# Patient Record
Sex: MALE | Race: WHITE | NOT HISPANIC OR LATINO | Employment: FULL TIME | URBAN - METROPOLITAN AREA
[De-identification: names, ages, dates, MRNs, and addresses within clinical notes are randomized per-mention and may not be internally consistent; named-entity substitution may affect disease eponyms.]

---

## 2017-01-16 ENCOUNTER — ALLSCRIPTS OFFICE VISIT (OUTPATIENT)
Dept: OTHER | Facility: OTHER | Age: 52
End: 2017-01-16

## 2017-01-16 DIAGNOSIS — E80.6 OTHER DISORDERS OF BILIRUBIN METABOLISM: ICD-10-CM

## 2017-05-10 ENCOUNTER — ALLSCRIPTS OFFICE VISIT (OUTPATIENT)
Dept: OTHER | Facility: OTHER | Age: 52
End: 2017-05-10

## 2017-05-10 DIAGNOSIS — R73.01 IMPAIRED FASTING GLUCOSE: ICD-10-CM

## 2017-05-10 DIAGNOSIS — E78.5 HYPERLIPIDEMIA: ICD-10-CM

## 2017-10-15 ENCOUNTER — LAB CONVERSION - ENCOUNTER (OUTPATIENT)
Dept: OTHER | Facility: OTHER | Age: 52
End: 2017-10-15

## 2017-10-15 LAB
A/G RATIO (HISTORICAL): 1.8 (CALC) (ref 1–2.5)
ALBUMIN SERPL BCP-MCNC: 4.6 G/DL (ref 3.6–5.1)
ALP SERPL-CCNC: 51 U/L (ref 40–115)
ALT SERPL W P-5'-P-CCNC: 35 U/L (ref 9–46)
AST SERPL W P-5'-P-CCNC: 21 U/L (ref 10–35)
BILIRUB SERPL-MCNC: 1.4 MG/DL (ref 0.2–1.2)
BUN SERPL-MCNC: 16 MG/DL (ref 7–25)
BUN/CREA RATIO (HISTORICAL): ABNORMAL (CALC) (ref 6–22)
CALCIUM SERPL-MCNC: 9.7 MG/DL (ref 8.6–10.3)
CHLORIDE SERPL-SCNC: 103 MMOL/L (ref 98–110)
CHOLEST SERPL-MCNC: 167 MG/DL
CHOLEST/HDLC SERPL: 3 (CALC)
CO2 SERPL-SCNC: 29 MMOL/L (ref 20–31)
CREAT SERPL-MCNC: 1.01 MG/DL (ref 0.7–1.33)
EGFR AFRICAN AMERICAN (HISTORICAL): 100 ML/MIN/1.73M2
EGFR-AMERICAN CALC (HISTORICAL): 86 ML/MIN/1.73M2
GAMMA GLOBULIN (HISTORICAL): 2.6 G/DL (CALC) (ref 1.9–3.7)
GLUCOSE (HISTORICAL): 113 MG/DL (ref 65–99)
HDLC SERPL-MCNC: 55 MG/DL
LDL CHOLESTEROL (HISTORICAL): 83 MG/DL (CALC)
NON-HDL-CHOL (CHOL-HDL) (HISTORICAL): 112 MG/DL (CALC)
POTASSIUM SERPL-SCNC: 4.7 MMOL/L (ref 3.5–5.3)
SODIUM SERPL-SCNC: 139 MMOL/L (ref 135–146)
TOTAL PROTEIN (HISTORICAL): 7.2 G/DL (ref 6.1–8.1)
TRIGL SERPL-MCNC: 193 MG/DL

## 2017-10-16 ENCOUNTER — LAB CONVERSION - ENCOUNTER (OUTPATIENT)
Dept: OTHER | Facility: OTHER | Age: 52
End: 2017-10-16

## 2017-10-16 LAB
A/G RATIO (HISTORICAL): 1.8 (CALC) (ref 1–2.5)
ALBUMIN SERPL BCP-MCNC: 4.6 G/DL (ref 3.6–5.1)
ALP SERPL-CCNC: 51 U/L (ref 40–115)
ALT SERPL W P-5'-P-CCNC: 35 U/L (ref 9–46)
AST SERPL W P-5'-P-CCNC: 21 U/L (ref 10–35)
BILIRUB SERPL-MCNC: 1.4 MG/DL (ref 0.2–1.2)
BUN SERPL-MCNC: 16 MG/DL (ref 7–25)
BUN/CREA RATIO (HISTORICAL): ABNORMAL (CALC) (ref 6–22)
CALCIUM SERPL-MCNC: 9.7 MG/DL (ref 8.6–10.3)
CHLORIDE SERPL-SCNC: 103 MMOL/L (ref 98–110)
CHOLEST SERPL-MCNC: 167 MG/DL
CHOLEST/HDLC SERPL: 3 (CALC)
CO2 SERPL-SCNC: 29 MMOL/L (ref 20–31)
CREAT SERPL-MCNC: 1.01 MG/DL (ref 0.7–1.33)
EGFR AFRICAN AMERICAN (HISTORICAL): 100 ML/MIN/1.73M2
EGFR-AMERICAN CALC (HISTORICAL): 86 ML/MIN/1.73M2
GAMMA GLOBULIN (HISTORICAL): 2.6 G/DL (CALC) (ref 1.9–3.7)
GLUCOSE (HISTORICAL): 113 MG/DL (ref 65–99)
HBA1C MFR BLD HPLC: 5.9 % OF TOTAL HGB
HDLC SERPL-MCNC: 55 MG/DL
LDL CHOLESTEROL (HISTORICAL): 83 MG/DL (CALC)
NON-HDL-CHOL (CHOL-HDL) (HISTORICAL): 112 MG/DL (CALC)
POTASSIUM SERPL-SCNC: 4.7 MMOL/L (ref 3.5–5.3)
SODIUM SERPL-SCNC: 139 MMOL/L (ref 135–146)
TOTAL PROTEIN (HISTORICAL): 7.2 G/DL (ref 6.1–8.1)
TRIGL SERPL-MCNC: 193 MG/DL

## 2017-10-20 ENCOUNTER — ALLSCRIPTS OFFICE VISIT (OUTPATIENT)
Dept: OTHER | Facility: OTHER | Age: 52
End: 2017-10-20

## 2017-10-24 ENCOUNTER — ALLSCRIPTS OFFICE VISIT (OUTPATIENT)
Dept: OTHER | Facility: OTHER | Age: 52
End: 2017-10-24

## 2017-10-24 DIAGNOSIS — R73.01 IMPAIRED FASTING GLUCOSE: ICD-10-CM

## 2017-10-24 DIAGNOSIS — R06.83 SNORING: ICD-10-CM

## 2017-10-24 DIAGNOSIS — E78.5 HYPERLIPIDEMIA: ICD-10-CM

## 2017-10-24 DIAGNOSIS — E80.6 OTHER DISORDERS OF BILIRUBIN METABOLISM: ICD-10-CM

## 2017-10-24 DIAGNOSIS — Z12.5 ENCOUNTER FOR SCREENING FOR MALIGNANT NEOPLASM OF PROSTATE: ICD-10-CM

## 2017-10-27 NOTE — PROGRESS NOTES
Assessment  1  Depression (311) (F32 9)   2  Dyslipidemia (272 4) (E78 5)   3  Impaired fasting glucose (790 21) (R73 01)   4  Obesity (BMI 35 0-39 9 without comorbidity) (278 00) (E66 9)   5  Bilirubinemia (782 4) (E80 6)   6  Snoring (786 09) (R06 83)   7  Screening PSA (prostate specific antigen) (V76 44) (Z12 5)    Plan  Bilirubinemia    · US ABDOMEN LIMITED; Status:Hold For - Scheduling; Requested ZCW:[id number];   Bilirubinemia, Dyslipidemia    · (1) HEPATIC FUNCTION PANEL; Status:Active; Requested FUD:[id number];   Bilirubinemia, Dyslipidemia, Impaired fasting glucose, Screening PSA (prostate specific antigen)    · (1) URINALYSIS (will reflex a microscopy if leukocytes, occult blood, protein or nitrites are not within  normal limits); Status:Active; Requested FWI:[id number];   Bilirubinemia, Snoring    · (1) BASIC METABOLIC PROFILE; Status:Active; Requested UVX:79KWH3414;    · (1) CBC/PLT/DIFF; Status:Active; Requested PIYUSH:[id number];   Depression screening    · *VB-Depression Screening; Status:Complete - Retrospective By Protocol Authorization;   Done:  58QKD9394 09:18AM   · *VB-Depression Screening ; every 1 year; Last 48MCJ3051; Next 84AMJ2805; Status:Active  PMH: Encounter for screening for malignant neoplasm of colon    · COLONOSCOPY; Status:Active; Requested WFO:[id number];   Screening PSA (prostate specific antigen)    · (1) PSA FREE & TOTAL; Status:Active; Requested AJK:[id number];   Snoring    · *Polysomnography, Sleep Study, Diagnostic; Status:Need Information - Financial Authorization; Requested UBY:[id number];   there any other medical conditions or medications that would explain these      symptoms? : No  is the sleep disturbance affecting the patient's ability to function? : daytime fatigue,      snoring  History/Symptoms: : daytime fatigue, snoring  Study Only or Consult : Sleep Study and Consult and F/U with Sleep Specialist    Discussion/Summary  Discussion Summary:   Reviewed labs, A1C improved  Go for U/S regarding increased bilirubin  Continue statin  No med changes  Continue low fat low cholesterol diet and weight loss  Encouraged repeat sleep study and use CPAP which can help with weight and sleep  Continue on Wellbutrin with Effexor  Commended on reducing smoking, encouraged stop fully  4 month [facility] follow up for chronic conditions  Counseling Documentation With Imm: The patient was counseled regarding diagnostic results,-- instructions for management,-- risk factor reductions,-- prognosis,-- patient and family education,-- impressions,-- risks and benefits of treatment options,-- importance of compliance with treatment  Medication SE Review and Pt Understands Tx: Possible side effects of new medications were reviewed with the patient/guardian today  The treatment plan was reviewed with the patient/guardian  The patient/guardian understands and agrees with the treatment plan      Chief Complaint  Chief Complaint Free Text Note Form: Pt is here for a f/u appt  BW       History of Present Illness  HPI: 50year old male for follow up  Notes he was treated for dental pain and pharyngitis  He was tx with abx  Sore throat improving each day  Able to eat and drink  Fevers broke  Starting to feel better  He notes he caught this while traveling  He was in [place] while he was training for work  He lost 2 lbas since last visit  Trying to get back on track  Work is ok  + life stressors  Notes increased stress on his girlfriends side  Her son has some problems they are dealing with  Taking effexor as directed which helps  Does not follow with other specialists  Hwe notes he did nto yet get ultrasound of his liver  Plans to get this and colonoscopy  Obesity (Brief): The patient is being seen for a routine clinic follow-up of obesity  Depression (Follow-Up): The patient states his depression has been stable since the last visit  He describes this as mild  Comorbid Illnesses: See HPI     Interval Symptoms: stable depression-- and-- stable depressed mood  no SI no HI  Hyperlipidemia (Follow-Up): The patient states his hyperlipidemia has been stable since the last visit  Symptoms: denies chest pain,-- denies muscle pain-- and-- denies muscle weakness  Medications: the patient is adherent with his medication regimen  -- He denies medication side effects  Additional History: Taking fish oil to help with trigs  Trying to take BID dosing  Review of Systems  PHQ-9 Depression Scale: Over the past 2 weeks, how often have you been bothered by the following problems? 1 ) Little interest or pleasure in doing things? Not at all    2 ) Feeling down, depressed or hopeless? Not at all    3 ) Trouble falling asleep or sleeping too much? Several days  4 ) Feeling tired or having little energy? Several days  5 ) Poor appetite or overeating? Several days  6 ) Feeling bad about yourself, or that you are a failure, or have let yourself or your family down? Not at all    7 ) Trouble concentrating on things, such as reading a newspaper or watching television? Not at all    8 ) Moving or speaking so slowly that other people could have noticed, or the opposite, moving or speaking faster than usual? Not at all  How difficult have these problems made it for you to do your work, take care of things at home, or get along with people? Not at all  Score 3       Complete-Male:   Constitutional: no fever,-- no recent weight gain-- and-- no chills  ENT: ST improving  Cardiovascular: no chest pain-- and-- no palpitations  Respiratory: no shortness of breath-- and-- no cough  Gastrointestinal: no abdominal pain,-- no nausea,-- no vomiting,-- no constipation,-- no diarrhea-- and-- no blood in stools  Psychiatric: anxiety,-- depression-- and-- stable on medications, but-- as noted in HPI  Active Problems  1  Acute pharyngitis, unspecified etiology (462) (J02 9)   2  Depression (311) (F32 9)   3   Dyslipidemia (272  4) (E78 5)   4  Impaired fasting glucose (790 21) (R73 01)   5  Obesity (BMI 35 0-39 9 without comorbidity) (278 00) (E66 9)    Past Medical History  1  History of Chest pain, unspecified chest pain type   2  History of Elevated BP without diagnosis of hypertension (796 2) (R03 0)   3  History of Increased bilirubin level (277 4) (E80 6)  Active Problems And Past Medical History Reviewed: The active problems and past medical history were reviewed and updated today  Surgical History  1  Denied: History Of Prior Surgery   2  History of Lip Repair  Surgical History Reviewed: The surgical history was reviewed and updated today  Family History  Father    1  Family history of borderline diabetes mellitus (V18 0) (Z84 89)   2  Family history of hypothyroidism (V18 19) (Z83 49)   3  Family history of malignant neoplasm of prostate (V16 42) (Z80 45)  Sister    4  Family history of fibromyalgia (V17 89) (Z82 69)   5  Family history of hypothyroidism (V18 19) (Z83 49)  Family History    6  Family history of Heart problem  Family History Reviewed: The family history was reviewed and updated today  Social History   · Former smoker (B43 43) (U70 052)   · Denied: History of No drug use   · Social alcohol use (Z78 9)  Social History Reviewed: The social history was reviewed and updated today  Current Meds   1  Aspirin 81 MG TABS; Therapy: (HSAZJJQL:87JFL5522) to Recorded   2  Atorvastatin Calcium 20 MG Oral Tablet; TAKE 1 TABLET DAILY  Requested for: 16HGH4577; Last   Rx:18Oct2017 Ordered   3  BuPROPion HCl ER (XL) 300 MG Oral Tablet Extended Release 24 Hour; TAKE 1 TABLET DAILY    Requested for: 88DEU0986; Last Rx:18Oct2017 Ordered   4  [facility] Fish Oil 1000 MG Oral Capsule; TAKE 2 CAPSULE Twice daily; Last Rx:28Nov2016 Ordered   5  Magnesium TABS; Therapy: (Recorded:19Zxh4603) to Recorded   6  Multivitamin TABS; Therapy: (XLTKYEBO:31RCX9004) to Recorded   7   Turmeric Curcumin Oral Capsule; Therapy: (Recorded:72Vtt8875) to Recorded   8  Venlafaxine HCl  MG Oral Capsule Extended Release 24 Hour; TAKE 1 CAPSULE DAILY; Therapy: 88GVZ4151 to (Evaluate:74Ydc0960)  Requested for: 53Wfa9541; Last Rx:15Czy3276   Ordered  Medication List Reviewed: The medication list was reviewed and updated today  Allergies  1  No Known Drug Allergies  2  No Known Environmental Allergies   3  No Known Food Allergies    Vitals  Vital Signs    Recorded: 57SEF8656 09:02AM   Temperature 98 6 F, Oral   Heart Rate 734   Systolic 071, LUE, Sitting   Diastolic 88, LUE, Sitting   BP CUFF SIZE Large   Height 5 ft 11 in   Weight 284 lb 2 oz   BMI Calculated 39 63   BSA Calculated 2 45   O2 Saturation 97, RA     Physical Exam    Constitutional   General appearance: No acute distress, well appearing and well nourished  -- Alert, pleasant cooperative seated in NAD  Eyes   Pupils and irises: Equal, round and reactive to light  -- reactive, wearing glasses  Ears, Nose, Mouth, and Throat   Oropharynx: Normal with no erythema, edema, exudate or lesions  -- MMM, normal pharynx without erythema or exudates  Pulmonary   Respiratory effort: No increased work of breathing or signs of respiratory distress  -- CTA throughout  Auscultation of lungs: Clear to auscultation, equal breath sounds bilaterally, no wheezes, no rales, no rhonci  Cardiovascular   Auscultation of heart: Normal rate and rhythm, normal S1 and S2, without murmurs  -- RRR -- No edema     Abdomen soft, NT, obese, no palpable masses obesity limites exam    Musculoskeletal   Gait and station: Normal     Psychiatric   Mood and affect: Normal  -- (Appropriate dress, good eye contact) Mood and Affect: appropriate mood,-- not angry,-- not anxious,-- not concerned,-- depressed,-- not tearful-- and-- not unemotional         Results/Data  (1) LIPID PANEL, FASTING 23TVX2219 08:09AM Maria Alejandra Munoz     Test Name Result Flag Reference   CHOLESTEROL, TOTAL 167 mg/dL <200   HDL CHOLESTEROL 55 mg/dL  >84   TRIGLICERIDES 956 mg/dL H <150   LDL-CHOLESTEROL 83 mg/dL (calc)     Reference range: <100     Desirable range <100 mg/dL for patients with CHD or  diabetes and <70 mg/dL for diabetic patients with  known heart disease  LDL-C is now calculated using the Reed-Nelson   calculation, which is a validated novel method providing   better accuracy than the Friedewald equation in the   estimation of LDL-C  Sara Holloway  [place] [id number];171(77): 0881-8095   (http://FastSpring/faq/MRZ447)   CHOL/HDLC RATIO 3 0 (calc)  <5 0   NON HDL CHOLESTEROL 112 mg/dL (calc)  <130   For patients with diabetes plus 1 major ASCVD risk   factor, treating to a non-HDL-C goal of <100 mg/dL   (LDL-C of <70 mg/dL) is considered a therapeutic   option  (1) COMPREHENSIVE METABOLIC PANEL 92SJR7830 40:15XI Gladis Piedra   REPORT COMMENT:  FASTING:YES     Test Name Result Flag Reference   GLUCOSE 113 mg/dL H 65-99   Fasting reference interval     For someone without known diabetes, a glucose value  between 100 and 125 mg/dL is consistent with  prediabetes and should be confirmed with a  follow-up test    UREA NITROGEN (BUN) 16 mg/dL  7-25   CREATININE 1 01 mg/dL  0 70-1 33   For patients >52years of age, the reference limit  for Creatinine is approximately 13% higher for people  identified as -American  eGFR NON-AFR   AMERICAN 86 mL/min/1 73m2  > OR = 60   eGFR AFRICAN AMERICAN 100 mL/min/1 73m2  > OR = 60   BUN/CREATININE RATIO   7-85   NOT APPLICABLE (calc)   SODIUM 139 mmol/L  135-146   POTASSIUM 4 7 mmol/L  3 5-5 3   CHLORIDE 103 mmol/L     CARBON DIOXIDE 29 mmol/L  20-31   CALCIUM 9 7 mg/dL  8 6-10 3   PROTEIN, TOTAL 7 2 g/dL  6 1-8 1   ALBUMIN 4 6 g/dL  3 6-5 1   GLOBULIN 2 6 g/dL (calc)  1 9-3 7   ALBUMIN/GLOBULIN RATIO 1 8 (calc)  1 0-2 5   BILIRUBIN, TOTAL 1 4 mg/dL H 0 2-1 2   ALKALINE PHOSPHATASE 51 U/L     AST 21 U/L  10-35   ALT 35 U/L  9-46 (1) LIPID PANEL, FASTING 40Cdn3188 08:09AM Ted Ladd     Test Name Result Flag Reference   CHOLESTEROL, TOTAL 167 mg/dL  <200   HDL CHOLESTEROL 55 mg/dL  >10   TRIGLICERIDES 528 mg/dL H <150   LDL-CHOLESTEROL 83 mg/dL (calc)     Reference range: <100     Desirable range <100 mg/dL for patients with CHD or  diabetes and <70 mg/dL for diabetic patients with  known heart disease  LDL-C is now calculated using the Reed-Nelson   calculation, which is a validated novel method providing   better accuracy than the Friedewald equation in the   estimation of LDL-C  Dori Armstrong  Beverly Goyal  1572;964(94): 6838-3216   (http://DestinationRX/faq/DBK250)   CHOL/HDLC RATIO 3 0 (calc)  <5 0   NON HDL CHOLESTEROL 112 mg/dL (calc)  <130   For patients with diabetes plus 1 major ASCVD risk   factor, treating to a non-HDL-C goal of <100 mg/dL   (LDL-C of <70 mg/dL) is considered a therapeutic   option  (1) COMPREHENSIVE METABOLIC PANEL 10RCF5896 79:40UO Ted Ladd     Test Name Result Flag Reference   GLUCOSE 113 mg/dL H 65-99   Fasting reference interval     For someone without known diabetes, a glucose value  between 100 and 125 mg/dL is consistent with  prediabetes and should be confirmed with a  follow-up test    UREA NITROGEN (BUN) 16 mg/dL  7-25   CREATININE 1 01 mg/dL  0 70-1 33   For patients >52years of age, the reference limit  for Creatinine is approximately 13% higher for people  identified as -American  eGFR NON-AFR   AMERICAN 86 mL/min/1 73m2  > OR = 60   eGFR AFRICAN AMERICAN 100 mL/min/1 73m2  > OR = 60   BUN/CREATININE RATIO   8-35   NOT APPLICABLE (calc)   SODIUM 139 mmol/L  135-146   POTASSIUM 4 7 mmol/L  3 5-5 3   CHLORIDE 103 mmol/L     CARBON DIOXIDE 29 mmol/L  20-31   CALCIUM 9 7 mg/dL  8 6-10 3   PROTEIN, TOTAL 7 2 g/dL  6 1-8 1   ALBUMIN 4 6 g/dL  3 6-5 1   GLOBULIN 2 6 g/dL (calc)  1 9-3 7   ALBUMIN/GLOBULIN RATIO 1 8 (calc)  1 0-2 5   BILIRUBIN, TOTAL 1 4 mg/dL H 0 2-1 2   ALKALINE PHOSPHATASE 51 U/L     AST 21 U/L  10-35   ALT 35 U/L  9-46     (Q) HEMOGLOBIN A1c 14Oct2017 08:09AM Moses Borja   REPORT COMMENT:  FASTING:YES     Test Name Result Flag Reference   HEMOGLOBIN A1c 5 9 % of total Hgb H <5 7   For someone without known diabetes, a hemoglobin   A1c value between 5 7% and 6 4% is consistent with  prediabetes and should be confirmed with a   follow-up test      For someone with known diabetes, a value <7%  indicates that their diabetes is well controlled  A1c  targets should be individualized based on duration of  diabetes, age, comorbid conditions, and other  considerations  This assay result is consistent with an increased risk  of diabetes  Currently, no consensus exists regarding use of  hemoglobin A1c for diagnosis of diabetes for children       Signatures   Electronically signed by : Bjorn Gutierrez, [facility]; Oct 24 2017  9:32AM EST                       (Author)    Electronically signed by : Cheryle Xiao MD; Oct 26 2017  5:30PM EST

## 2018-01-13 VITALS
HEIGHT: 71 IN | SYSTOLIC BLOOD PRESSURE: 112 MMHG | HEART RATE: 95 BPM | TEMPERATURE: 99.2 F | BODY MASS INDEX: 39.59 KG/M2 | OXYGEN SATURATION: 98 % | DIASTOLIC BLOOD PRESSURE: 80 MMHG | WEIGHT: 282.8 LBS

## 2018-01-13 VITALS
HEIGHT: 71 IN | TEMPERATURE: 98.5 F | WEIGHT: 286 LBS | DIASTOLIC BLOOD PRESSURE: 84 MMHG | HEART RATE: 86 BPM | SYSTOLIC BLOOD PRESSURE: 130 MMHG | OXYGEN SATURATION: 98 % | BODY MASS INDEX: 40.04 KG/M2

## 2018-01-13 VITALS
TEMPERATURE: 96 F | SYSTOLIC BLOOD PRESSURE: 134 MMHG | DIASTOLIC BLOOD PRESSURE: 84 MMHG | WEIGHT: 286 LBS | BODY MASS INDEX: 40.04 KG/M2 | OXYGEN SATURATION: 97 % | HEART RATE: 88 BPM | HEIGHT: 71 IN

## 2018-01-14 VITALS
SYSTOLIC BLOOD PRESSURE: 138 MMHG | DIASTOLIC BLOOD PRESSURE: 88 MMHG | BODY MASS INDEX: 39.78 KG/M2 | HEIGHT: 71 IN | TEMPERATURE: 98.6 F | HEART RATE: 101 BPM | OXYGEN SATURATION: 97 % | WEIGHT: 284.13 LBS

## 2018-01-22 ENCOUNTER — ALLSCRIPTS OFFICE VISIT (OUTPATIENT)
Dept: OTHER | Facility: OTHER | Age: 53
End: 2018-01-22

## 2018-01-24 NOTE — MISCELLANEOUS
Message  Return to work or school:   Cristina Pedraza is under my professional care   He was seen in my office on 1/22/2018   He is able to return to work on  1/25/2018            Signatures   Electronically signed by : Vinay Gao; Jan 22 2018 11:57AM EST                       (Author)

## 2018-01-31 DIAGNOSIS — F32.A DEPRESSION, UNSPECIFIED DEPRESSION TYPE: Primary | ICD-10-CM

## 2018-01-31 RX ORDER — VENLAFAXINE HYDROCHLORIDE 150 MG/1
150 CAPSULE, EXTENDED RELEASE ORAL DAILY
Qty: 90 CAPSULE | Refills: 0 | Status: SHIPPED | OUTPATIENT
Start: 2018-01-31 | End: 2018-05-18 | Stop reason: SDUPTHER

## 2018-01-31 RX ORDER — VENLAFAXINE HYDROCHLORIDE 150 MG/1
1 CAPSULE, EXTENDED RELEASE ORAL DAILY
COMMUNITY
Start: 2015-12-09 | End: 2018-01-31 | Stop reason: SDUPTHER

## 2018-04-27 DIAGNOSIS — F32.A DEPRESSION, UNSPECIFIED DEPRESSION TYPE: ICD-10-CM

## 2018-04-27 DIAGNOSIS — E78.5 DYSLIPIDEMIA: Primary | ICD-10-CM

## 2018-04-27 RX ORDER — ATORVASTATIN CALCIUM 20 MG/1
1 TABLET, FILM COATED ORAL DAILY
COMMUNITY
End: 2018-04-27 | Stop reason: SDUPTHER

## 2018-04-27 RX ORDER — ATORVASTATIN CALCIUM 20 MG/1
20 TABLET, FILM COATED ORAL DAILY
Qty: 90 TABLET | Refills: 0 | Status: SHIPPED | OUTPATIENT
Start: 2018-04-27 | End: 2018-07-27 | Stop reason: SDUPTHER

## 2018-04-27 RX ORDER — BUPROPION HYDROCHLORIDE 300 MG/1
1 TABLET ORAL DAILY
COMMUNITY
End: 2018-04-27 | Stop reason: SDUPTHER

## 2018-04-27 RX ORDER — BUPROPION HYDROCHLORIDE 300 MG/1
300 TABLET ORAL DAILY
Qty: 90 TABLET | Refills: 0 | Status: SHIPPED | OUTPATIENT
Start: 2018-04-27 | End: 2018-07-27 | Stop reason: SDUPTHER

## 2018-05-18 DIAGNOSIS — F32.A DEPRESSION, UNSPECIFIED DEPRESSION TYPE: ICD-10-CM

## 2018-05-18 RX ORDER — VENLAFAXINE HYDROCHLORIDE 150 MG/1
150 CAPSULE, EXTENDED RELEASE ORAL DAILY
Qty: 90 CAPSULE | Refills: 0 | Status: SHIPPED | OUTPATIENT
Start: 2018-05-18 | End: 2018-07-27 | Stop reason: SDUPTHER

## 2018-05-18 NOTE — TELEPHONE ENCOUNTER
Please call patient and let him know, no further refills will be given until follow-up and please schedule

## 2018-05-18 NOTE — TELEPHONE ENCOUNTER
Pt needs 90 day b/c Rx is being sent to mail order  No further refills will be given until he is seen  Pt was to f/u in 4M

## 2018-07-27 DIAGNOSIS — F32.A DEPRESSION, UNSPECIFIED DEPRESSION TYPE: ICD-10-CM

## 2018-07-27 DIAGNOSIS — E78.5 DYSLIPIDEMIA: ICD-10-CM

## 2018-07-27 RX ORDER — VENLAFAXINE HYDROCHLORIDE 150 MG/1
150 CAPSULE, EXTENDED RELEASE ORAL DAILY
Qty: 90 CAPSULE | Refills: 1 | Status: SHIPPED | OUTPATIENT
Start: 2018-07-27 | End: 2018-10-26 | Stop reason: SDUPTHER

## 2018-07-27 RX ORDER — ELECTROLYTES/DEXTROSE
1 SOLUTION, ORAL ORAL DAILY
COMMUNITY

## 2018-07-27 RX ORDER — CHLORHEXIDINE/GLYCERIN/HE-CELL
2 JELLY (GRAM) TOPICAL 2 TIMES DAILY
COMMUNITY

## 2018-07-27 RX ORDER — ATORVASTATIN CALCIUM 20 MG/1
20 TABLET, FILM COATED ORAL DAILY
Qty: 90 TABLET | Refills: 1 | Status: SHIPPED | OUTPATIENT
Start: 2018-07-27 | End: 2018-10-26 | Stop reason: SDUPTHER

## 2018-07-27 RX ORDER — BUPROPION HYDROCHLORIDE 300 MG/1
300 TABLET ORAL DAILY
Qty: 90 TABLET | Refills: 1 | Status: SHIPPED | OUTPATIENT
Start: 2018-07-27 | End: 2018-10-26 | Stop reason: SDUPTHER

## 2018-08-15 LAB
ALBUMIN SERPL-MCNC: 4.7 G/DL (ref 3.6–5.1)
ALBUMIN/GLOB SERPL: 1.9 (CALC) (ref 1–2.5)
ALP SERPL-CCNC: 54 U/L (ref 40–115)
ALT SERPL-CCNC: 32 U/L (ref 9–46)
APPEARANCE UR: CLEAR
AST SERPL-CCNC: 23 U/L (ref 10–35)
BASOPHILS # BLD AUTO: 42 CELLS/UL (ref 0–200)
BASOPHILS NFR BLD AUTO: 0.8 %
BILIRUB DIRECT SERPL-MCNC: 0.2 MG/DL
BILIRUB INDIRECT SERPL-MCNC: 1.2 MG/DL (CALC) (ref 0.2–1.2)
BILIRUB SERPL-MCNC: 1.4 MG/DL (ref 0.2–1.2)
BILIRUB UR QL STRIP: NEGATIVE
BUN SERPL-MCNC: 19 MG/DL (ref 7–25)
BUN/CREAT SERPL: NORMAL (CALC) (ref 6–22)
CALCIUM SERPL-MCNC: 9.6 MG/DL (ref 8.6–10.3)
CHLORIDE SERPL-SCNC: 99 MMOL/L (ref 98–110)
CO2 SERPL-SCNC: 28 MMOL/L (ref 20–32)
COLOR UR: YELLOW
CREAT SERPL-MCNC: 0.95 MG/DL (ref 0.7–1.33)
EOSINOPHIL # BLD AUTO: 170 CELLS/UL (ref 15–500)
EOSINOPHIL NFR BLD AUTO: 3.2 %
ERYTHROCYTE [DISTWIDTH] IN BLOOD BY AUTOMATED COUNT: 14.3 % (ref 11–15)
GLOBULIN SER CALC-MCNC: 2.5 G/DL (CALC) (ref 1.9–3.7)
GLUCOSE SERPL-MCNC: 99 MG/DL (ref 65–99)
GLUCOSE UR QL STRIP: NEGATIVE
HCT VFR BLD AUTO: 46.7 % (ref 38.5–50)
HGB BLD-MCNC: 15.4 G/DL (ref 13.2–17.1)
HGB UR QL STRIP: NEGATIVE
KETONES UR QL STRIP: NEGATIVE
LEUKOCYTE ESTERASE UR QL STRIP: NEGATIVE
LYMPHOCYTES # BLD AUTO: 1786 CELLS/UL (ref 850–3900)
LYMPHOCYTES NFR BLD AUTO: 33.7 %
MCH RBC QN AUTO: 30.5 PG (ref 27–33)
MCHC RBC AUTO-ENTMCNC: 33 G/DL (ref 32–36)
MCV RBC AUTO: 92.5 FL (ref 80–100)
MONOCYTES # BLD AUTO: 541 CELLS/UL (ref 200–950)
MONOCYTES NFR BLD AUTO: 10.2 %
NEUTROPHILS # BLD AUTO: 2761 CELLS/UL (ref 1500–7800)
NEUTROPHILS NFR BLD AUTO: 52.1 %
NITRITE UR QL STRIP: NEGATIVE
PH UR STRIP: 6 [PH] (ref 5–8)
PLATELET # BLD AUTO: 205 THOUSAND/UL (ref 140–400)
PMV BLD REES-ECKER: 10.6 FL (ref 7.5–12.5)
POTASSIUM SERPL-SCNC: 4.5 MMOL/L (ref 3.5–5.3)
PROT SERPL-MCNC: 7.2 G/DL (ref 6.1–8.1)
PROT UR QL STRIP: NEGATIVE
PSA FREE MFR SERPL: 33 % (CALC)
PSA FREE SERPL-MCNC: 0.1 NG/ML
PSA SERPL-MCNC: 0.3 NG/ML
RBC # BLD AUTO: 5.05 MILLION/UL (ref 4.2–5.8)
SL AMB EGFR AFRICAN AMERICAN: 107 ML/MIN/1.73M2
SL AMB EGFR NON AFRICAN AMERICAN: 92 ML/MIN/1.73M2
SODIUM SERPL-SCNC: 135 MMOL/L (ref 135–146)
SP GR UR STRIP: 1.02 (ref 1–1.03)
WBC # BLD AUTO: 5.3 THOUSAND/UL (ref 3.8–10.8)

## 2018-08-17 ENCOUNTER — OFFICE VISIT (OUTPATIENT)
Dept: INTERNAL MEDICINE CLINIC | Facility: CLINIC | Age: 53
End: 2018-08-17
Payer: COMMERCIAL

## 2018-08-17 VITALS
OXYGEN SATURATION: 97 % | HEART RATE: 83 BPM | SYSTOLIC BLOOD PRESSURE: 122 MMHG | TEMPERATURE: 98.1 F | BODY MASS INDEX: 36.31 KG/M2 | HEIGHT: 71 IN | DIASTOLIC BLOOD PRESSURE: 78 MMHG | WEIGHT: 259.4 LBS

## 2018-08-17 DIAGNOSIS — R06.83 SNORING: ICD-10-CM

## 2018-08-17 DIAGNOSIS — E66.9 OBESITY (BMI 35.0-39.9 WITHOUT COMORBIDITY): ICD-10-CM

## 2018-08-17 DIAGNOSIS — F32.A DEPRESSION, UNSPECIFIED DEPRESSION TYPE: ICD-10-CM

## 2018-08-17 DIAGNOSIS — R73.01 IMPAIRED FASTING GLUCOSE: ICD-10-CM

## 2018-08-17 DIAGNOSIS — H91.93 DECREASED HEARING OF BOTH EARS: ICD-10-CM

## 2018-08-17 DIAGNOSIS — Z12.11 SCREENING FOR COLON CANCER: Primary | ICD-10-CM

## 2018-08-17 DIAGNOSIS — E78.5 DYSLIPIDEMIA: ICD-10-CM

## 2018-08-17 DIAGNOSIS — E80.6 BILIRUBINEMIA: ICD-10-CM

## 2018-08-17 PROBLEM — H66.002 ACUTE SUPPURATIVE OTITIS MEDIA OF LEFT EAR WITHOUT SPONTANEOUS RUPTURE OF TYMPANIC MEMBRANE: Status: RESOLVED | Noted: 2018-01-22 | Resolved: 2018-08-17

## 2018-08-17 PROBLEM — R68.89 FLU-LIKE SYMPTOMS: Status: ACTIVE | Noted: 2018-01-22

## 2018-08-17 PROBLEM — H66.002 ACUTE SUPPURATIVE OTITIS MEDIA OF LEFT EAR WITHOUT SPONTANEOUS RUPTURE OF TYMPANIC MEMBRANE: Status: ACTIVE | Noted: 2018-01-22

## 2018-08-17 PROBLEM — R68.89 FLU-LIKE SYMPTOMS: Status: RESOLVED | Noted: 2018-01-22 | Resolved: 2018-08-17

## 2018-08-17 PROBLEM — J02.9 ACUTE PHARYNGITIS: Status: RESOLVED | Noted: 2017-10-20 | Resolved: 2018-08-17

## 2018-08-17 PROBLEM — J02.9 ACUTE PHARYNGITIS: Status: ACTIVE | Noted: 2017-10-20

## 2018-08-17 PROCEDURE — 99214 OFFICE O/P EST MOD 30 MIN: CPT | Performed by: PHYSICIAN ASSISTANT

## 2018-08-17 PROCEDURE — 1036F TOBACCO NON-USER: CPT | Performed by: PHYSICIAN ASSISTANT

## 2018-08-17 NOTE — ASSESSMENT & PLAN NOTE
Grossly hearing intact however patient having more difficulty with hearing in noisy situations    Will send for formal hearing evaluation

## 2018-08-17 NOTE — PROGRESS NOTES
[facility] [address] [place] [address]  Standard Office Visit  Patient ID: Clarissa Aquino    : 1965  Age/Gender: 48 y o  male     DATE: 2018      Assessment/Plan:    Impaired fasting glucose  Commended patient on recent weight loss  Continue with diet and lifestyle modifications  Balance diet recommended  Bilirubinemia  Will check U/S, hepatic fxn reviewed  Tbili 1 4, unchanged form prior  Advised reduced ETOH intake  Decreased hearing of both ears    Grossly hearing intact however patient having more difficulty with hearing in noisy situations  Will send for formal hearing evaluation    Depression   Stable at this time on Effexor and Wellbutrin  No dose change  Will continue to follow    Dyslipidemia   Lipids were not check with most recent laboratory studies  Will continue Lipitor at this time  Will check CMP and lipids with next lab set    Obesity (BMI 35 0-39 9 without comorbidity)   Commended patient on weight loss  Give encouragement  Will continue to follow  Snoring  Advised him to schedule sleep study, order given today  Diagnoses and all orders for this visit:    Screening for colon cancer  -     Ambulatory referral to Gastroenterology; Future    Impaired fasting glucose  -     Hemoglobin A1C; Future    Bilirubinemia  -     Comprehensive metabolic panel; Future  -     US liver; Future    Dyslipidemia  -     Lipid Panel with Direct LDL reflex; Future  -     Comprehensive metabolic panel; Future    Obesity (BMI 35 0-39 9 without comorbidity)    Snoring  -     Diagnostic Sleep Study; Future    Decreased hearing of both ears  -     Audiogram screen; Future    Depression, unspecified depression type          Subjective:   Chief Complaint   Patient presents with    Follow-up     Pt is here for a follow up for HTN  Review labs from 18            Clarissa Aquino is a 48 y o  male who presents to the office on 2018 for For follow up  Notes he has not been able to get testing from 10/2017 visit due to being without insurance  He notes he had the flu earlier this year but sx resolved  Since last visit with me he changed jobs  Notes he still does have travel but likes what he is doing more at this jobs  Weight decreased from 290 to 259  Very active at his job  Notes he is very busy  Has nto had back issues in some time  Has referral for sleep study  Wanted to check to see if there is anything he needs to do before any sleep testing done  He notes he is due for ultrasound of the liver and sleep study  No formal exercise program   He notes that he feels he should have his hearing checked  Has noticed it more over last 6 months  Has problems distinguishing what people say  HE can hear but notes he has to have people repeat itself  Worse when he has air conditioner and TV  Worse when there is background noise to compete with  No known ear trauma   + Hx of loud noise exposure, always liked louder music  No ringing in the ears  No dizziness  HE stopped smoking  He has been tobacco free about 1 week  Notes he still does snore  Has improved with his weight loss  Some daytime fatigue  Girlfriend notes he does have apnic events when snoring  Weight loss has improved these sx but still does snore  Depression   This is a chronic problem  The problem has been gradually improving  Pertinent negatives include no abdominal pain, anorexia, change in bowel habit, chest pain, chills, coughing, fatigue, headaches, nausea, numbness, sore throat, visual change (last eye exam was 3 years ago  No recent vision changes) or weakness         The following portions of the patient's history were reviewed and updated as appropriate: allergies, current medications, past family history, past medical history, past social history, past surgical history and problem list     Review of Systems   Constitutional: Negative for chills, fatigue and unexpected weight change  HENT: Negative for sore throat  Respiratory: Negative for cough, shortness of breath and wheezing  No exertional symptoms  Cardiovascular: Negative for chest pain and palpitations  Gastrointestinal: Negative for abdominal pain, anorexia, change in bowel habit and nausea  Neurological: Negative for dizziness, syncope, weakness, light-headedness, numbness and headaches  Psychiatric/Behavioral: Positive for depression           Patient Active Problem List   Diagnosis    Bilirubinemia    Depression    Dyslipidemia    Impaired fasting glucose    Obesity (BMI 35 0-39 9 without comorbidity)    Snoring    Decreased hearing of both ears       Past Medical History:   Diagnosis Date    Acute suppurative otitis media of left ear without spontaneous rupture of tympanic membrane 1/22/2018    Depression     HL (hearing loss)     Hypertension     Obesity (BMI 35 0-39 9 without comorbidity) 1/16/2017       Past Surgical History:   Procedure Laterality Date    WISDOM TOOTH EXTRACTION           Current Outpatient Prescriptions:     aspirin 81 MG tablet, Take 1 tablet by mouth daily, Disp: , Rfl:     atorvastatin (LIPITOR) 20 mg tablet, Take 1 tablet (20 mg total) by mouth daily, Disp: 90 tablet, Rfl: 1    buPROPion (WELLBUTRIN XL) 300 mg 24 hr tablet, Take 1 tablet (300 mg total) by mouth daily, Disp: 90 tablet, Rfl: 1    Multiple Vitamins-Minerals (MULTIVITAMIN ADULT) TABS, Take 1 tablet by mouth daily, Disp: , Rfl:     Omega-3 Fatty Acids ([employer] FISH OIL) 1000 MG CAPS, Take 2 capsules by mouth 2 (two) times a day, Disp: , Rfl:     Turmeric Curcumin 500 MG CAPS, Take 1 capsule by mouth daily, Disp: , Rfl:     venlafaxine (EFFEXOR-XR) 150 mg 24 hr capsule, Take 1 capsule (150 mg total) by mouth daily, Disp: 90 capsule, Rfl: 1    Magnesium 100 MG TABS, Take 1 tablet by mouth daily, Disp: , Rfl:     No Known Allergies    Social History     Social History    Marital status:      Spouse name: N/A    Number of children: N/A    Years of education: N/A     Social History Main Topics    Smoking status: Former Smoker     Quit date: 12/2015    Smokeless tobacco: Never Used    Alcohol use Yes      Comment: Social    Drug use: No    Sexual activity: Not Asked     Other Topics Concern    None     Social History Narrative    None       Family History   Problem Relation Age of Onset    Diabetes Father         borderline    Hypothyroidism Father     Prostate cancer Father     Hypothyroidism Sister     Fibromyalgia Sister     Heart disease Family        PHQ-9 Depression Screening    PHQ-9:    Frequency of the following problems over the past two weeks:       Little interest or pleasure in doing things:  0 - not at all  Feeling down, depressed, or hopeless:  0 - not at all  PHQ-2 Score:  0         Health Maintenance   Topic Date Due    HIV SCREENING  1965    CRC Screening: Colonoscopy  1965    DTaP,Tdap,and Td Vaccines (1 - Tdap) 04/14/1988    INFLUENZA VACCINE  09/01/2018       Immunization History   Administered Date(s) Administered    H1N1, All Formulations 01/14/2010    Influenza 12/07/2015, 11/28/2016    Influenza Quadrivalent Preservative Free 3 years and older IM 12/07/2015    Influenza Quadrivalent, 6-35 Months IM 11/28/2016        Objective:  Vitals:    08/17/18 0839   BP: 122/78   BP Location: Left arm   Patient Position: Sitting   Cuff Size: Adult   Pulse: 83   Temp: 98 1 °F (36 7 °C)   TempSrc: Oral   SpO2: 97%   Weight: 118 kg (259 lb 6 4 oz)   Height: 5' 11" (1 803 m)     Wt Readings from Last 3 Encounters:   08/17/18 118 kg (259 lb 6 4 oz)   10/24/17 129 kg (284 lb 2 oz)   10/20/17 130 kg (286 lb)     Body mass index is 36 18 kg/m²  No exam data present       Physical Exam   Constitutional: He is oriented to person, place, and time  He appears well-developed and well-nourished  No distress     Alert pleasant cooperative  Seated in room in no acute distress   HENT:   Head: Normocephalic and atraumatic  Right Ear: External ear normal    Left Ear: External ear normal    Mouth/Throat: Oropharynx is clear and moist  No oropharyngeal exudate  No erythema or effusion of TM bilaterally  No cerumen impaction  Normal response to spoken word  Normal soft sound bilaterally   Eyes: Pupils are equal, round, and reactive to light  Right eye exhibits no discharge  Left eye exhibits no discharge  No scleral icterus  Neck: Neck supple  Cardiovascular: Normal rate, regular rhythm and normal heart sounds  No murmur heard  Pulmonary/Chest: Effort normal and breath sounds normal  No respiratory distress  He has no wheezes  Clear to auscultation throughout  No crackles no rhonchi no wheeze  No respiratory distress   Abdominal: Soft  Bowel sounds are normal  There is no tenderness  Soft nontender nondistended, positive bowel sounds   Musculoskeletal: He exhibits no edema  Lymphadenopathy:     He has no cervical adenopathy  Neurological: He is alert and oriented to person, place, and time  Skin: Skin is warm and dry  He is not diaphoretic  Psychiatric: He has a normal mood and affect  His behavior is normal  Thought content normal    Nursing note and vitals reviewed            Future Appointments  Date Time Provider Jaqueline Taylor   12/21/2018 7:00 AM Georgie Baldwin PA-C [address] [place] [address] [address] [place] [address]    Patient Care Team:  Cece Wolfe MD as PCP - General

## 2018-08-17 NOTE — ASSESSMENT & PLAN NOTE
Commended patient on recent weight loss  Continue with diet and lifestyle modifications  Balance diet recommended

## 2018-08-17 NOTE — PATIENT INSTRUCTIONS
Impaired fasting glucose  Commended patient on recent weight loss  Continue with diet and lifestyle modifications  Balance diet recommended  Bilirubinemia  Will check U/S, hepatic fxn reviewed  Tbili 1 4, unchanged form prior  Advised reduced ETOH intake  Decreased hearing of both ears    Grossly hearing intact however patient having more difficulty with hearing in noisy situations  Will send for formal hearing evaluation    Depression   Stable at this time on Effexor and Wellbutrin  No dose change  Will continue to follow    Dyslipidemia   Lipids were not check with most recent laboratory studies  Will continue Lipitor at this time  Will check CMP and lipids with next lab set    Obesity (BMI 35 0-39 9 without comorbidity)   Commended patient on weight loss  Give encouragement  Will continue to follow  Snoring  Advised him to schedule sleep study, order given today

## 2018-08-17 NOTE — ASSESSMENT & PLAN NOTE
Lipids were not check with most recent laboratory studies  Will continue Lipitor at this time    Will check CMP and lipids with next lab set

## 2018-09-07 ENCOUNTER — TELEPHONE (OUTPATIENT)
Dept: INTERNAL MEDICINE CLINIC | Facility: CLINIC | Age: 53
End: 2018-09-07

## 2018-09-07 NOTE — TELEPHONE ENCOUNTER
Received letter from sleep study center  They have tried but have been unsuccessful in reaching the patient  Please contact him and advise him to schedule sleep study  If we are not able to contact him please send a letter    Thanks Merle Nicholson

## 2018-09-24 NOTE — TELEPHONE ENCOUNTER
Reviewing naomie lane as he is out of the office  Noted Alexsandra Lara response in that pt aware need to have sleep study  Will done phone call

## 2018-10-26 DIAGNOSIS — E78.5 DYSLIPIDEMIA: ICD-10-CM

## 2018-10-26 DIAGNOSIS — F32.A DEPRESSION, UNSPECIFIED DEPRESSION TYPE: ICD-10-CM

## 2018-10-26 RX ORDER — ATORVASTATIN CALCIUM 20 MG/1
20 TABLET, FILM COATED ORAL DAILY
Qty: 90 TABLET | Refills: 0 | Status: SHIPPED | OUTPATIENT
Start: 2018-10-26 | End: 2019-02-01 | Stop reason: SDUPTHER

## 2018-10-26 RX ORDER — VENLAFAXINE HYDROCHLORIDE 150 MG/1
150 CAPSULE, EXTENDED RELEASE ORAL DAILY
Qty: 90 CAPSULE | Refills: 0 | Status: SHIPPED | OUTPATIENT
Start: 2018-10-26 | End: 2019-02-01 | Stop reason: SDUPTHER

## 2018-10-26 RX ORDER — BUPROPION HYDROCHLORIDE 300 MG/1
300 TABLET ORAL DAILY
Qty: 90 TABLET | Refills: 0 | Status: SHIPPED | OUTPATIENT
Start: 2018-10-26 | End: 2019-02-01 | Stop reason: SDUPTHER

## 2018-10-27 ENCOUNTER — HOSPITAL ENCOUNTER (OUTPATIENT)
Dept: RADIOLOGY | Facility: HOSPITAL | Age: 53
Discharge: HOME | End: 2018-10-27
Payer: COMMERCIAL

## 2018-10-27 DIAGNOSIS — E80.6 BILIRUBINEMIA: ICD-10-CM

## 2018-10-27 PROCEDURE — 76705 ECHO EXAM OF ABDOMEN: CPT

## 2018-11-08 ENCOUNTER — TELEPHONE (OUTPATIENT)
Dept: INTERNAL MEDICINE CLINIC | Facility: CLINIC | Age: 53
End: 2018-11-08

## 2018-11-08 DIAGNOSIS — R93.2 ABNORMAL LIVER ULTRASOUND: Primary | ICD-10-CM

## 2018-11-08 NOTE — TELEPHONE ENCOUNTER
Assisting with Angela lane as he is out of office  Reviewed u/s liver completed due to elevated bilirubin  Impression below  Recommend MRI  Pt called  Reviewed above  MRI ordered  No questions  Will forward to naomie as an [place]  IMPRESSION:     Hemangioma versus focal fatty infiltration noted within the right lobe of the liver  Consider contrast-enhanced MRI for further characterization

## 2018-12-19 LAB
ALBUMIN SERPL-MCNC: 4.5 G/DL (ref 3.6–5.1)
ALBUMIN/GLOB SERPL: 1.8 (CALC) (ref 1–2.5)
ALP SERPL-CCNC: 49 U/L (ref 40–115)
ALT SERPL-CCNC: 27 U/L (ref 9–46)
AST SERPL-CCNC: 20 U/L (ref 10–35)
BILIRUB SERPL-MCNC: 0.9 MG/DL (ref 0.2–1.2)
BUN SERPL-MCNC: 17 MG/DL (ref 7–25)
BUN/CREAT SERPL: ABNORMAL (CALC) (ref 6–22)
CALCIUM SERPL-MCNC: 9.4 MG/DL (ref 8.6–10.3)
CHLORIDE SERPL-SCNC: 102 MMOL/L (ref 98–110)
CHOLEST SERPL-MCNC: 146 MG/DL
CHOLEST/HDLC SERPL: 2.5 (CALC)
CO2 SERPL-SCNC: 31 MMOL/L (ref 20–32)
CREAT SERPL-MCNC: 0.92 MG/DL (ref 0.7–1.33)
GLOBULIN SER CALC-MCNC: 2.5 G/DL (CALC) (ref 1.9–3.7)
GLUCOSE SERPL-MCNC: 107 MG/DL (ref 65–99)
HBA1C MFR BLD: 5.9 % OF TOTAL HGB
HDLC SERPL-MCNC: 58 MG/DL
LDLC SERPL CALC-MCNC: 70 MG/DL (CALC)
NONHDLC SERPL-MCNC: 88 MG/DL (CALC)
POTASSIUM SERPL-SCNC: 4.9 MMOL/L (ref 3.5–5.3)
PROT SERPL-MCNC: 7 G/DL (ref 6.1–8.1)
SL AMB EGFR AFRICAN AMERICAN: 111 ML/MIN/1.73M2
SL AMB EGFR NON AFRICAN AMERICAN: 96 ML/MIN/1.73M2
SODIUM SERPL-SCNC: 138 MMOL/L (ref 135–146)
TRIGL SERPL-MCNC: 101 MG/DL

## 2018-12-21 ENCOUNTER — OFFICE VISIT (OUTPATIENT)
Dept: INTERNAL MEDICINE CLINIC | Facility: CLINIC | Age: 53
End: 2018-12-21
Payer: COMMERCIAL

## 2018-12-21 VITALS
OXYGEN SATURATION: 97 % | TEMPERATURE: 99.7 F | HEIGHT: 72 IN | DIASTOLIC BLOOD PRESSURE: 86 MMHG | SYSTOLIC BLOOD PRESSURE: 122 MMHG | BODY MASS INDEX: 34.46 KG/M2 | HEART RATE: 76 BPM | WEIGHT: 254.4 LBS

## 2018-12-21 DIAGNOSIS — E66.9 OBESITY (BMI 35.0-39.9 WITHOUT COMORBIDITY): ICD-10-CM

## 2018-12-21 DIAGNOSIS — E80.6 BILIRUBINEMIA: ICD-10-CM

## 2018-12-21 DIAGNOSIS — R06.83 SNORING: ICD-10-CM

## 2018-12-21 DIAGNOSIS — Z12.11 SCREEN FOR COLON CANCER: ICD-10-CM

## 2018-12-21 DIAGNOSIS — H91.93 DECREASED HEARING OF BOTH EARS: ICD-10-CM

## 2018-12-21 DIAGNOSIS — R93.2 ABNORMAL LIVER ULTRASOUND: ICD-10-CM

## 2018-12-21 DIAGNOSIS — R73.01 IMPAIRED FASTING GLUCOSE: ICD-10-CM

## 2018-12-21 DIAGNOSIS — E78.5 DYSLIPIDEMIA: ICD-10-CM

## 2018-12-21 DIAGNOSIS — Z23 NEED FOR INFLUENZA VACCINATION: Primary | ICD-10-CM

## 2018-12-21 PROCEDURE — 90471 IMMUNIZATION ADMIN: CPT

## 2018-12-21 PROCEDURE — 99214 OFFICE O/P EST MOD 30 MIN: CPT | Performed by: PHYSICIAN ASSISTANT

## 2018-12-21 PROCEDURE — 90682 RIV4 VACC RECOMBINANT DNA IM: CPT

## 2018-12-21 RX ORDER — GINKGO BILOBA LEAF EXTRACT 60 MG
CAPSULE ORAL DAILY
COMMUNITY

## 2018-12-21 NOTE — ASSESSMENT & PLAN NOTE
Patient was printed order for home sleep study which she will check with his insurance to see that it is covered

## 2018-12-21 NOTE — PROGRESS NOTES
[facility]  Standard Office Visit  Patient ID: Corinne Flow    : 1965  Age/Gender: 48 y o  male     DATE: 2018      Assessment/Plan:    Impaired fasting glucose  Reviewed recent labs  A1c 5 9  Weight improved since last visit  Give encouragement for diet and lifestyle and working toward slow gradual weight loss  Goal of 1 lb every 1-2 weeks  Goal of BMI less than 30  Discussed nutritionist as an option in the future if unable to reach weight loss goals  Abnormal liver ultrasound  Reviewed previous ultrasound  MRI order has previously been placed  Printed order today for patient to call and schedule  Reviewed ultrasound and recommended MRI    Bilirubinemia  Bilirubin improved return to normal on most recent lab set  Will follow with fasting CMP with next lab set    Decreased hearing of both ears  Since patient today for formal hearing evaluation  Encouraged him to call and schedule  Dyslipidemia  Stable at this time on Lipitor  Continue low-fat low-cholesterol diet  Continue fish oil tablets  Obesity (BMI 35 0-39 9 without comorbidity)  BMI 34  Goal BMI <30  Give encouragement with starting at gym and weight loss  Snoring  Patient was printed order for home sleep study which she will check with his insurance to see that it is covered  Diagnoses and all orders for this visit:    Need for influenza vaccination  Comments:  Flu shot today  Orders:  -     PREFERRED: influenza vaccine, [id number], quadrivalent, recombinant, PF, 0 5 mL, for patients 18 yr+ (FLUBLOK)    Impaired fasting glucose    Bilirubinemia    Dyslipidemia    Obesity (BMI 35 0-39 9 without comorbidity)    Decreased hearing of both ears    Abnormal liver ultrasound    Snoring  -     Home Study;  Future    Screen for colon cancer  Comments:  Patient has referral for colonoscopy and colorectal provider he will call and schedule and plans to do so in the new year    Other orders  -     co-enzyme Q-10 30 MG capsule; Take 30 mg by mouth daily  -     Green Tea 150 MG CAPS; Take by mouth daily          Subjective:   Chief Complaint   Patient presents with    Follow-up     Pt is here today for depression, pt has lab to be reviewed  Pt stated his drepression is much better  Marietta Holman is a 48 y o  male who presents to the office on 12/21/2018 for     For follow up  Notes he had labs last week  He notes he knows he needs to get sleep study and MRI of abdomen  He wants to know if his insurance will cover a home sleep study  He notes work is going well  Putting in long days 11 hours but had some less driving  At this time feeling well on his medications  No complaints or concerns at this time  Feeling good  He started a new gym membership and plans to start exercising and working towards weight loss  He notes in last 1 5 years he has had to ask peiopel to repeat themselves more when they are talking  No prior noise exposure that patient can recall  No head/ear trauma  Notes he is workign on weight loss  Glucose improving  Down 5 lbs since last visit  Got gym membership  Has MRI order and plans to schedule MRI abdomen  Continues to snore at night but plans to get sleep study  No change in snoring pattern  Feels well rested after a normal nights sleep  No AM headaches  Depression   This is a chronic problem  The problem has been waxing and waning  Pertinent negatives include no abdominal pain, anorexia, chest pain, coughing, fatigue, fever, nausea, numbness, rash, sore throat, swollen glands or vomiting  Nothing aggravates the symptoms  The treatment provided mild relief         The following portions of the patient's history were reviewed and updated as appropriate: allergies, current medications, past family history, past medical history, past social history, past surgical history and problem list     Review of Systems Constitutional: Negative for fatigue and fever  HENT: Negative for sore throat  Respiratory: Negative for cough, shortness of breath and wheezing  He does snore at night  Wants to try a home sleep study  Notes he previously did not do well with the hospital sleep study  Cardiovascular: Negative for chest pain and palpitations  Gastrointestinal: Negative for abdominal pain, anorexia, constipation, diarrhea, nausea and vomiting  Plans to get colonoscopy  Genitourinary: Negative for dysuria  Skin: Negative for rash  Neurological: Negative for dizziness, syncope, light-headedness and numbness  Psychiatric/Behavioral: Positive for depression  Negative for agitation           Patient Active Problem List   Diagnosis    Depression    Dyslipidemia    Impaired fasting glucose    Obesity (BMI 35 0-39 9 without comorbidity)    Snoring    Decreased hearing of both ears    Abnormal liver ultrasound       Past Medical History:   Diagnosis Date    Acute suppurative otitis media of left ear without spontaneous rupture of tympanic membrane 1/22/2018    Depression     HL (hearing loss)     Hypertension     Obesity (BMI 35 0-39 9 without comorbidity) 1/16/2017       Past Surgical History:   Procedure Laterality Date    WISDOM TOOTH EXTRACTION           Current Outpatient Prescriptions:     aspirin 81 MG tablet, Take 1 tablet by mouth daily, Disp: , Rfl:     atorvastatin (LIPITOR) 20 mg tablet, Take 1 tablet (20 mg total) by mouth daily, Disp: 90 tablet, Rfl: 0    buPROPion (WELLBUTRIN XL) 300 mg 24 hr tablet, Take 1 tablet (300 mg total) by mouth daily, Disp: 90 tablet, Rfl: 0    co-enzyme Q-10 30 MG capsule, Take 30 mg by mouth daily, Disp: , Rfl:     Green Tea 150 MG CAPS, Take by mouth daily, Disp: , Rfl:     Multiple Vitamins-Minerals (MULTIVITAMIN ADULT) TABS, Take 1 tablet by mouth daily, Disp: , Rfl:     Omega-3 Fatty Acids (CVS FISH OIL) 1000 MG CAPS, Take 2 capsules by mouth 2 (two) times a day, Disp: , Rfl:     Turmeric Curcumin 500 MG CAPS, Take 1 capsule by mouth daily, Disp: , Rfl:     venlafaxine (EFFEXOR-XR) 150 mg 24 hr capsule, Take 1 capsule (150 mg total) by mouth daily, Disp: 90 capsule, Rfl: 0    Magnesium 100 MG TABS, Take 1 tablet by mouth daily, Disp: , Rfl:     No Known Allergies    Social History     Social History    Marital status:      Spouse name: N/A    Number of children: N/A    Years of education: N/A     Social History Main Topics    Smoking status: Former Smoker     Quit date: 12/2015    Smokeless tobacco: Never Used    Alcohol use Yes      Comment: Social    Drug use: No    Sexual activity: Not Asked     Other Topics Concern    None     Social History Narrative    None       Family History   Problem Relation Age of Onset    Diabetes Father         borderline    Hypothyroidism Father     Prostate cancer Father     Hypothyroidism Sister     Fibromyalgia Sister     Heart disease Family        PHQ-9 Depression Screening    PHQ-9:    Frequency of the following problems over the past two weeks:              Health Maintenance   Topic Date Due    CRC Screening: Colonoscopy  1965    DTaP,Tdap,and Td Vaccines (1 - Tdap) 04/14/1988    INFLUENZA VACCINE  07/01/2018       Immunization History   Administered Date(s) Administered    H1N1, All Formulations 01/14/2010    Influenza 12/07/2015, 11/28/2016    Influenza Quadrivalent Preservative Free 3 years and older IM 12/07/2015    Influenza Quadrivalent, 6-35 Months IM 11/28/2016        Objective:  Vitals:    12/21/18 0656 12/21/18 0720   BP: 128/88 122/86   BP Location: Left arm    Patient Position: Sitting    Cuff Size: Large    Pulse: 76    Temp: 99 7 °F (37 6 °C)    TempSrc: Oral    SpO2: 97%    Weight: 115 kg (254 lb 6 4 oz)    Height: 5' 11 75" (1 822 m)      Wt Readings from Last 3 Encounters:   12/21/18 115 kg (254 lb 6 4 oz)   08/17/18 118 kg (259 lb 6 4 oz)   10/24/17 129 kg (284 lb 2 oz)     Body mass index is 34 74 kg/m²  No exam data present       Physical Exam   Constitutional: He is oriented to person, place, and time  He appears well-developed and well-nourished  No distress  Alert pleasant cooperative  Seated in room in no acute distress   HENT:   Head: Normocephalic and atraumatic  Mouth/Throat: Oropharynx is clear and moist  No oropharyngeal exudate  Mallampati 3  Normal posterior pharynx  No erythema or exudates      Normal hearing to spoken word   Eyes: Pupils are equal, round, and reactive to light  Right eye exhibits no discharge  Left eye exhibits no discharge  No scleral icterus  TM normal bilaterally with normal cone of light reflex   Neck: Neck supple  Cardiovascular: Normal rate, regular rhythm and normal heart sounds  No murmur heard  Pulmonary/Chest: Effort normal and breath sounds normal  No respiratory distress  He has no wheezes  He has no rales  Clear to auscultation throughout  No crackles no rhonchi no wheeze  No respiratory distress   Abdominal: Soft  Bowel sounds are normal  There is no tenderness  There is no guarding  Positive bowel sounds  Soft nontender nondistended  No palpable hepatomegaly   Musculoskeletal: He exhibits no edema  Neurological: He is alert and oriented to person, place, and time  No gross focal neurologic deficits   Skin: Skin is warm and dry  He is not diaphoretic  Psychiatric: He has a normal mood and affect  His behavior is normal  Judgment and thought content normal    Nursing note and vitals reviewed  No future appointments      Darren Orantes PA-C  [address]    Patient Care Team:  Darren Orantes PA-C as PCP - General (Internal Medicine)

## 2018-12-21 NOTE — ASSESSMENT & PLAN NOTE
Reviewed recent labs  A1c 5 9  Weight improved since last visit  Give encouragement for diet and lifestyle and working toward slow gradual weight loss  Goal of 1 lb every 1-2 weeks  Goal of BMI less than 30  Discussed nutritionist as an option in the future if unable to reach weight loss goals

## 2018-12-21 NOTE — ASSESSMENT & PLAN NOTE
Reviewed previous ultrasound  MRI order has previously been placed  Printed order today for patient to call and schedule    Reviewed ultrasound and recommended MRI

## 2018-12-21 NOTE — ASSESSMENT & PLAN NOTE
Bilirubin improved return to normal on most recent lab set    Will follow with fasting CMP with next lab set

## 2018-12-21 NOTE — PATIENT INSTRUCTIONS
Impaired fasting glucose  Reviewed recent labs  A1c 5 9  Weight improved since last visit  Give encouragement for diet and lifestyle and working toward slow gradual weight loss  Goal of 1 lb every 1-2 weeks  Goal of BMI less than 30  Discussed nutritionist as an option in the future if unable to reach weight loss goals  Abnormal liver ultrasound  Reviewed previous ultrasound  MRI order has previously been placed  Printed order today for patient to call and schedule  Reviewed ultrasound and recommended MRI    Bilirubinemia  Bilirubin improved return to normal on most recent lab set  Will follow with fasting CMP with next lab set    Decreased hearing of both ears  Since patient today for formal hearing evaluation  Encouraged him to call and schedule  Dyslipidemia  Stable at this time on Lipitor  Continue low-fat low-cholesterol diet  Continue fish oil tablets  Obesity (BMI 35 0-39 9 without comorbidity)  BMI 34  Goal BMI <30  Give encouragement with starting at gym and weight loss  Snoring  Patient was printed order for home sleep study which she will check with his insurance to see that it is covered

## 2019-02-01 DIAGNOSIS — E78.5 DYSLIPIDEMIA: ICD-10-CM

## 2019-02-01 DIAGNOSIS — F32.A DEPRESSION, UNSPECIFIED DEPRESSION TYPE: ICD-10-CM

## 2019-02-01 RX ORDER — ATORVASTATIN CALCIUM 20 MG/1
20 TABLET, FILM COATED ORAL DAILY
Qty: 90 TABLET | Refills: 1 | Status: SHIPPED | OUTPATIENT
Start: 2019-02-01 | End: 2019-04-19 | Stop reason: SDUPTHER

## 2019-02-01 RX ORDER — BUPROPION HYDROCHLORIDE 300 MG/1
300 TABLET ORAL DAILY
Qty: 90 TABLET | Refills: 1 | Status: SHIPPED | OUTPATIENT
Start: 2019-02-01 | End: 2019-04-19 | Stop reason: SDUPTHER

## 2019-02-01 RX ORDER — VENLAFAXINE HYDROCHLORIDE 150 MG/1
150 CAPSULE, EXTENDED RELEASE ORAL DAILY
Qty: 90 CAPSULE | Refills: 1 | Status: SHIPPED | OUTPATIENT
Start: 2019-02-01 | End: 2019-04-19 | Stop reason: SDUPTHER

## 2019-03-15 ENCOUNTER — TELEPHONE (OUTPATIENT)
Dept: SLEEP CENTER | Facility: CLINIC | Age: 54
End: 2019-03-15

## 2019-03-15 NOTE — TELEPHONE ENCOUNTER
----- Message from Nadine Cochran MD sent at 3/12/2019  8:19 PM EDT -----  approved  ----- Message -----  From: Yesy Bynum  Sent: 3/12/2019  12:34 PM  To: Sleep Medicine [facility] [place], #    PLEASE REVIEW FOR APPROVAL OR DENIAL AND WHY

## 2019-03-19 ENCOUNTER — TRANSCRIBE ORDERS (OUTPATIENT)
Dept: SLEEP CENTER | Facility: CLINIC | Age: 54
End: 2019-03-19

## 2019-03-27 ENCOUNTER — HOSPITAL ENCOUNTER (OUTPATIENT)
Dept: RADIOLOGY | Facility: HOSPITAL | Age: 54
Discharge: HOME | End: 2019-03-27
Payer: COMMERCIAL

## 2019-03-27 DIAGNOSIS — R93.2 ABNORMAL LIVER ULTRASOUND: ICD-10-CM

## 2019-03-27 PROCEDURE — A9585 GADOBUTROL INJECTION: HCPCS | Performed by: NURSE PRACTITIONER

## 2019-03-27 PROCEDURE — 74183 MRI ABD W/O CNTR FLWD CNTR: CPT

## 2019-03-27 RX ADMIN — GADOBUTROL 12 ML: 604.72 INJECTION INTRAVENOUS at 19:25

## 2019-04-05 ENCOUNTER — TELEPHONE (OUTPATIENT)
Dept: INTERNAL MEDICINE CLINIC | Facility: CLINIC | Age: 54
End: 2019-04-05

## 2019-04-05 DIAGNOSIS — R93.2 ABNORMAL LIVER ULTRASOUND: Primary | ICD-10-CM

## 2019-04-05 NOTE — RESULT ENCOUNTER NOTE
Called and reviewed MRI finding with patient  MRI abdomen reads:  "Suspect focal fat anterior to the middle hepatic vein which corresponds to the sonographic finding   Because of its masslike morphology, a six-month follow-up MRI recommended "  Discussed that we will get a repeat MRI in 6 months to monitor for any change  Patient verbalized understanding and will keep his scheduled follow up in 2 weeks for chronic conditions  Repeat MRI abdomen order placed

## 2019-04-18 LAB
ALBUMIN SERPL-MCNC: 4.2 G/DL (ref 3.6–5.1)
ALBUMIN/GLOB SERPL: 1.7 (CALC) (ref 1–2.5)
ALP SERPL-CCNC: 46 U/L (ref 40–115)
ALT SERPL-CCNC: 27 U/L (ref 9–46)
AST SERPL-CCNC: 21 U/L (ref 10–35)
BASOPHILS # BLD AUTO: 40 CELLS/UL (ref 0–200)
BASOPHILS NFR BLD AUTO: 0.6 %
BILIRUB SERPL-MCNC: 1.4 MG/DL (ref 0.2–1.2)
BUN SERPL-MCNC: 15 MG/DL (ref 7–25)
BUN/CREAT SERPL: ABNORMAL (CALC) (ref 6–22)
CALCIUM SERPL-MCNC: 9.5 MG/DL (ref 8.6–10.3)
CHLORIDE SERPL-SCNC: 100 MMOL/L (ref 98–110)
CHOLEST SERPL-MCNC: 154 MG/DL
CHOLEST/HDLC SERPL: 2.7 (CALC)
CO2 SERPL-SCNC: 29 MMOL/L (ref 20–32)
CREAT SERPL-MCNC: 0.94 MG/DL (ref 0.7–1.33)
EOSINOPHIL # BLD AUTO: 147 CELLS/UL (ref 15–500)
EOSINOPHIL NFR BLD AUTO: 2.2 %
ERYTHROCYTE [DISTWIDTH] IN BLOOD BY AUTOMATED COUNT: 13.4 % (ref 11–15)
GLOBULIN SER CALC-MCNC: 2.5 G/DL (CALC) (ref 1.9–3.7)
GLUCOSE SERPL-MCNC: 112 MG/DL (ref 65–99)
HCT VFR BLD AUTO: 44.8 % (ref 38.5–50)
HDLC SERPL-MCNC: 57 MG/DL
HGB BLD-MCNC: 14.8 G/DL (ref 13.2–17.1)
LDLC SERPL CALC-MCNC: 76 MG/DL (CALC)
LYMPHOCYTES # BLD AUTO: 1695 CELLS/UL (ref 850–3900)
LYMPHOCYTES NFR BLD AUTO: 25.3 %
MCH RBC QN AUTO: 30.3 PG (ref 27–33)
MCHC RBC AUTO-ENTMCNC: 33 G/DL (ref 32–36)
MCV RBC AUTO: 91.6 FL (ref 80–100)
MONOCYTES # BLD AUTO: 489 CELLS/UL (ref 200–950)
MONOCYTES NFR BLD AUTO: 7.3 %
NEUTROPHILS # BLD AUTO: 4328 CELLS/UL (ref 1500–7800)
NEUTROPHILS NFR BLD AUTO: 64.6 %
NONHDLC SERPL-MCNC: 97 MG/DL (CALC)
PLATELET # BLD AUTO: 210 THOUSAND/UL (ref 140–400)
PMV BLD REES-ECKER: 10.9 FL (ref 7.5–12.5)
POTASSIUM SERPL-SCNC: 4.5 MMOL/L (ref 3.5–5.3)
PROT SERPL-MCNC: 6.7 G/DL (ref 6.1–8.1)
RBC # BLD AUTO: 4.89 MILLION/UL (ref 4.2–5.8)
SL AMB EGFR AFRICAN AMERICAN: 108 ML/MIN/1.73M2
SL AMB EGFR NON AFRICAN AMERICAN: 93 ML/MIN/1.73M2
SODIUM SERPL-SCNC: 135 MMOL/L (ref 135–146)
TRIGL SERPL-MCNC: 124 MG/DL
WBC # BLD AUTO: 6.7 THOUSAND/UL (ref 3.8–10.8)

## 2019-04-19 ENCOUNTER — OFFICE VISIT (OUTPATIENT)
Dept: INTERNAL MEDICINE CLINIC | Age: 54
End: 2019-04-19
Payer: COMMERCIAL

## 2019-04-19 VITALS
OXYGEN SATURATION: 98 % | DIASTOLIC BLOOD PRESSURE: 80 MMHG | WEIGHT: 258.6 LBS | TEMPERATURE: 97.4 F | SYSTOLIC BLOOD PRESSURE: 122 MMHG | BODY MASS INDEX: 35.32 KG/M2 | HEART RATE: 70 BPM

## 2019-04-19 DIAGNOSIS — S39.011A STRAIN OF ABDOMINAL MUSCLE, INITIAL ENCOUNTER: ICD-10-CM

## 2019-04-19 DIAGNOSIS — Z12.11 SCREENING FOR MALIGNANT NEOPLASM OF COLON: ICD-10-CM

## 2019-04-19 DIAGNOSIS — Z12.11 SCREENING FOR COLON CANCER: Primary | ICD-10-CM

## 2019-04-19 DIAGNOSIS — R93.2 ABNORMAL LIVER ULTRASOUND: ICD-10-CM

## 2019-04-19 DIAGNOSIS — F32.A DEPRESSION, UNSPECIFIED DEPRESSION TYPE: ICD-10-CM

## 2019-04-19 DIAGNOSIS — R06.83 SNORING: ICD-10-CM

## 2019-04-19 DIAGNOSIS — R73.01 IMPAIRED FASTING GLUCOSE: ICD-10-CM

## 2019-04-19 DIAGNOSIS — E78.5 DYSLIPIDEMIA: ICD-10-CM

## 2019-04-19 DIAGNOSIS — E66.9 OBESITY (BMI 35.0-39.9 WITHOUT COMORBIDITY): ICD-10-CM

## 2019-04-19 DIAGNOSIS — R17 ELEVATED BILIRUBIN: ICD-10-CM

## 2019-04-19 PROCEDURE — 99214 OFFICE O/P EST MOD 30 MIN: CPT | Performed by: PHYSICIAN ASSISTANT

## 2019-04-19 PROCEDURE — 1036F TOBACCO NON-USER: CPT | Performed by: PHYSICIAN ASSISTANT

## 2019-04-19 RX ORDER — VENLAFAXINE HYDROCHLORIDE 150 MG/1
150 CAPSULE, EXTENDED RELEASE ORAL DAILY
Qty: 90 CAPSULE | Refills: 1 | Status: SHIPPED | OUTPATIENT
Start: 2019-04-19 | End: 2019-05-03 | Stop reason: SDUPTHER

## 2019-04-19 RX ORDER — BUPROPION HYDROCHLORIDE 300 MG/1
300 TABLET ORAL DAILY
Qty: 90 TABLET | Refills: 1 | Status: SHIPPED | OUTPATIENT
Start: 2019-04-19 | End: 2019-05-03 | Stop reason: SDUPTHER

## 2019-04-19 RX ORDER — ATORVASTATIN CALCIUM 20 MG/1
20 TABLET, FILM COATED ORAL DAILY
Qty: 90 TABLET | Refills: 1 | Status: SHIPPED | OUTPATIENT
Start: 2019-04-19 | End: 2019-05-03 | Stop reason: SDUPTHER

## 2019-05-03 DIAGNOSIS — F32.A DEPRESSION, UNSPECIFIED DEPRESSION TYPE: ICD-10-CM

## 2019-05-03 DIAGNOSIS — E78.5 DYSLIPIDEMIA: ICD-10-CM

## 2019-05-03 RX ORDER — BUPROPION HYDROCHLORIDE 300 MG/1
300 TABLET ORAL DAILY
Qty: 90 TABLET | Refills: 1 | Status: SHIPPED | OUTPATIENT
Start: 2019-05-03 | End: 2019-11-11 | Stop reason: SDUPTHER

## 2019-05-03 RX ORDER — ATORVASTATIN CALCIUM 20 MG/1
20 TABLET, FILM COATED ORAL DAILY
Qty: 90 TABLET | Refills: 1 | Status: SHIPPED | OUTPATIENT
Start: 2019-05-03 | End: 2019-11-11 | Stop reason: SDUPTHER

## 2019-05-03 RX ORDER — VENLAFAXINE HYDROCHLORIDE 150 MG/1
150 CAPSULE, EXTENDED RELEASE ORAL DAILY
Qty: 90 CAPSULE | Refills: 1 | Status: SHIPPED | OUTPATIENT
Start: 2019-05-03 | End: 2020-02-12 | Stop reason: SDUPTHER

## 2019-10-08 LAB
ALBUMIN SERPL-MCNC: 4 G/DL (ref 3.6–5.1)
ALBUMIN/GLOB SERPL: 1.7 (CALC) (ref 1–2.5)
ALP SERPL-CCNC: 41 U/L (ref 40–115)
ALT SERPL-CCNC: 24 U/L (ref 9–46)
AST SERPL-CCNC: 17 U/L (ref 10–35)
BILIRUB SERPL-MCNC: 0.6 MG/DL (ref 0.2–1.2)
BUN SERPL-MCNC: 16 MG/DL (ref 7–25)
BUN/CREAT SERPL: ABNORMAL (CALC) (ref 6–22)
CALCIUM SERPL-MCNC: 9 MG/DL (ref 8.6–10.3)
CHLORIDE SERPL-SCNC: 104 MMOL/L (ref 98–110)
CHOLEST SERPL-MCNC: 147 MG/DL
CHOLEST/HDLC SERPL: 2.5 (CALC)
CO2 SERPL-SCNC: 30 MMOL/L (ref 20–32)
CREAT SERPL-MCNC: 1 MG/DL (ref 0.7–1.33)
EST. AVERAGE GLUCOSE BLD GHB EST-MCNC: 123 (CALC)
EST. AVERAGE GLUCOSE BLD GHB EST-SCNC: 6.8 (CALC)
GLOBULIN SER CALC-MCNC: 2.3 G/DL (CALC) (ref 1.9–3.7)
GLUCOSE SERPL-MCNC: 105 MG/DL (ref 65–99)
HBA1C MFR BLD: 5.9 % OF TOTAL HGB
HDLC SERPL-MCNC: 58 MG/DL
LDLC SERPL CALC-MCNC: 70 MG/DL (CALC)
NONHDLC SERPL-MCNC: 89 MG/DL (CALC)
POTASSIUM SERPL-SCNC: 4.8 MMOL/L (ref 3.5–5.3)
PROT SERPL-MCNC: 6.3 G/DL (ref 6.1–8.1)
SL AMB EGFR AFRICAN AMERICAN: 100 ML/MIN/1.73M2
SL AMB EGFR NON AFRICAN AMERICAN: 86 ML/MIN/1.73M2
SODIUM SERPL-SCNC: 139 MMOL/L (ref 135–146)
TRIGL SERPL-MCNC: 103 MG/DL

## 2019-10-11 ENCOUNTER — OFFICE VISIT (OUTPATIENT)
Dept: INTERNAL MEDICINE CLINIC | Facility: CLINIC | Age: 54
End: 2019-10-11
Payer: COMMERCIAL

## 2019-10-11 VITALS
HEIGHT: 72 IN | OXYGEN SATURATION: 98 % | HEART RATE: 70 BPM | BODY MASS INDEX: 34.51 KG/M2 | WEIGHT: 254.8 LBS | TEMPERATURE: 98.4 F | SYSTOLIC BLOOD PRESSURE: 132 MMHG | DIASTOLIC BLOOD PRESSURE: 90 MMHG

## 2019-10-11 DIAGNOSIS — R73.01 IMPAIRED FASTING GLUCOSE: ICD-10-CM

## 2019-10-11 DIAGNOSIS — R17 ELEVATED BILIRUBIN: ICD-10-CM

## 2019-10-11 DIAGNOSIS — R03.0 BLOOD PRESSURE ELEVATED WITHOUT HISTORY OF HTN: ICD-10-CM

## 2019-10-11 DIAGNOSIS — F32.A DEPRESSION, UNSPECIFIED DEPRESSION TYPE: ICD-10-CM

## 2019-10-11 DIAGNOSIS — Z12.11 SCREENING FOR MALIGNANT NEOPLASM OF COLON: Primary | ICD-10-CM

## 2019-10-11 DIAGNOSIS — R93.2 ABNORMAL LIVER ULTRASOUND: ICD-10-CM

## 2019-10-11 DIAGNOSIS — R06.83 SNORING: ICD-10-CM

## 2019-10-11 DIAGNOSIS — E78.5 DYSLIPIDEMIA: ICD-10-CM

## 2019-10-11 DIAGNOSIS — Z23 NEEDS FLU SHOT: ICD-10-CM

## 2019-10-11 DIAGNOSIS — Z84.89 FAMILY HISTORY OF BENIGN NEOPLASM OF PROSTATE: ICD-10-CM

## 2019-10-11 DIAGNOSIS — Z12.5 SCREENING FOR MALIGNANT NEOPLASM OF PROSTATE: ICD-10-CM

## 2019-10-11 DIAGNOSIS — S39.011A STRAIN OF ABDOMINAL MUSCLE, INITIAL ENCOUNTER: ICD-10-CM

## 2019-10-11 PROCEDURE — 99214 OFFICE O/P EST MOD 30 MIN: CPT | Performed by: PHYSICIAN ASSISTANT

## 2019-10-11 PROCEDURE — 90682 RIV4 VACC RECOMBINANT DNA IM: CPT

## 2019-10-11 PROCEDURE — 90471 IMMUNIZATION ADMIN: CPT

## 2019-10-11 PROCEDURE — 3008F BODY MASS INDEX DOCD: CPT | Performed by: PHYSICIAN ASSISTANT

## 2019-10-11 NOTE — ASSESSMENT & PLAN NOTE
Discussed importance of scheduling sleep study  Discussed how uncontrolled sleep apnea can affect patient's weight as well as blood pressure  Patient notes his naomie has also been discussing with him the importance of getting a sleep study  He has see order at home and plans to call to schedule

## 2019-10-11 NOTE — PROGRESS NOTES
[facility]  Standard Office Visit  Patient ID: Vanda Goodrich    : 1965  Age/Gender: 48 y o  male     DATE: 10/11/2019      Assessment/Plan:    Needs flu shot  Risks and benefits discussed  Flu shot given today  Abnormal liver ultrasound  MRI 3/2019 completed  Patient due for 6 month MRI follow up> Patient has order for MRI and was directed to schedule  He plans to get this done by end of the year  LFTs normal       Impaired fasting glucose  A1C unchanged form last lab ()  Encourage diet, lifestyle and exercise to help improve A1C, will continue to follow  Abdominal muscle strain  Sx fully resolved  Elevated bilirubin  Returned to normal   Will resolve this issue  Dyslipidemia  Well controlled on Lipitor  No med changes  Encouraged weight loss diet and lifestyle  Will continue to follow  Blood pressure elevated without history of HTN  Discussed importance of low-salt diet weight loss and regular exercise to help improve blood pressure  Patient's BMI 34  Discussed BMI goal of less than 30  Advised patient to start to check his blood pressure at home  Patient's naomi works as a medical assistant and can check his blood pressure  Keep a blood pressure log to bring into next follow-up  Discussed goal blood pressure is systolic (upper number) less than 957 diastolic (lower number) less than 80  Will wee patient back to review BP log in 1-2 months  Screening for malignant neoplasm of colon  Patient considering colonoscopy  Patient has previous GI referral however uncertain if his GI referral is still good  New referral placed today  Discussed risks and benefits of screening    Depression  Good control at this time on Wellbutrin and Effexor  No dose change  Will continue to follow    Snoring  Discussed importance of scheduling sleep study    Discussed how uncontrolled sleep apnea can affect patient's weight as well as blood pressure  Patient notes his fiancee has also been discussing with him the importance of getting a sleep study  He has see order at home and plans to call to schedule  Diagnoses and all orders for this visit:    Screening for malignant neoplasm of colon  -     Ambulatory referral to Gastroenterology; Future    Needs flu shot  Comments:  Risks and benefits discussed  Flu shot given today  Orders:  -     influenza vaccine, [id number], quadrivalent, recombinant, PF, 0 5 mL, for patients 18 yr+ (FLUBLOK)    Abnormal liver ultrasound    Impaired fasting glucose    Strain of abdominal muscle, initial encounter    Depression, unspecified depression type    Dyslipidemia    Elevated bilirubin    Snoring    Blood pressure elevated without history of HTN    Screening for malignant neoplasm of prostate  -     PSA, Total Screen; Future    Family history of benign neoplasm of prostate  -     PSA, Total Screen; Future          Subjective:   Chief Complaint   Patient presents with    Follow-up     Patient is here for 4 months f/u and review blood work  Pt does not have any new complains at the moment  Wing Correa is a 48 y o  male who presents to the office on 10/11/2019 for     Notes he is interested in flu shot  Wants to check with his insurance about shingles shot before proceeding  Notes he had chickenpox as an adult after exposure to someone with shingles about 10 years ago  Notes work stressors have improved  No longer with abdominal pain  Feeling much better  Notes he plans to get his follow up MRI abdomen  Has not yet rescheduled sleep study but plans to do so  Depressive sx well controlled  No daily exercise  Prior was exercising which he enjoyed  Works long days (12 hours)  Finds little time to exercise  Currently feeling good  Engaged to be   Patient lives with his fiancee who is a medical assistant and is helping him to try and improve his health overall    He has no other complaints or concerns at this time  No history of high blood pressure  Not currently taking any medication for blood pressure control    S good, 7 hours per night  I Good interest has hobbies, history buff, enjoys documentaries  G Somewhat about his health  E Good  C Good  A Good  P Denies  S denies        Depression   This is a chronic problem  The problem occurs intermittently  The problem has been resolved  Pertinent negatives include no abdominal pain, anorexia, chest pain, chills, coughing, fatigue, fever, nausea, numbness, rash, vomiting or weakness  Hyperlipidemia   This is a chronic problem  The current episode started more than 1 year ago  The problem is controlled  Recent lipid tests were reviewed and are normal  He has no history of diabetes  Pertinent negatives include no chest pain  Current antihyperlipidemic treatment includes statins  The current treatment provides significant improvement of lipids  Compliance problems include adherence to diet  Risk factors for coronary artery disease include male sex, obesity, dyslipidemia and family history  The following portions of the patient's history were reviewed and updated as appropriate: allergies, current medications, past family history, past medical history, past social history, past surgical history and problem list     Review of Systems   Constitutional: Negative for chills, fatigue and fever  Eyes: Negative for visual disturbance  Respiratory: Negative for cough and wheezing  Cardiovascular: Negative for chest pain and palpitations  Gastrointestinal: Negative for abdominal pain, anorexia, blood in stool, constipation, diarrhea, nausea and vomiting  Genitourinary: Negative for dysuria  Father with history of prostate cancer   Skin: Negative for rash  Neurological: Negative for dizziness, syncope, weakness, light-headedness and numbness  Psychiatric/Behavioral: Positive for depression   Negative for agitation           Patient Active Problem List   Diagnosis    Depression    Dyslipidemia    Impaired fasting glucose    Obesity (BMI 35 0-39 9 without comorbidity)    Snoring    Decreased hearing of both ears    Abnormal liver ultrasound    Screening for malignant neoplasm of colon    Needs flu shot    Blood pressure elevated without history of HTN       Past Medical History:   Diagnosis Date    Acute suppurative otitis media of left ear without spontaneous rupture of tympanic membrane 1/22/2018    Depression     HL (hearing loss)     Hypertension     Obesity (BMI 35 0-39 9 without comorbidity) 1/16/2017       Past Surgical History:   Procedure Laterality Date    WISDOM TOOTH EXTRACTION           Current Outpatient Medications:     aspirin 81 MG tablet, Take 1 tablet by mouth daily, Disp: , Rfl:     atorvastatin (LIPITOR) 20 mg tablet, Take 1 tablet (20 mg total) by mouth daily, Disp: 90 tablet, Rfl: 1    buPROPion (WELLBUTRIN XL) 300 mg 24 hr tablet, Take 1 tablet (300 mg total) by mouth daily, Disp: 90 tablet, Rfl: 1    co-enzyme Q-10 30 MG capsule, Take 30 mg by mouth daily, Disp: , Rfl:     Green Tea 150 MG CAPS, Take by mouth daily, Disp: , Rfl:     Multiple Vitamins-Minerals (MULTIVITAMIN ADULT) TABS, Take 1 tablet by mouth daily, Disp: , Rfl:     Omega-3 Fatty Acids ([employer] FISH OIL) 1000 MG CAPS, Take 2 capsules by mouth 2 (two) times a day, Disp: , Rfl:     Turmeric Curcumin 500 MG CAPS, Take 1 capsule by mouth daily, Disp: , Rfl:     venlafaxine (EFFEXOR-XR) 150 mg 24 hr capsule, Take 1 capsule (150 mg total) by mouth daily, Disp: 90 capsule, Rfl: 1    Magnesium 100 MG TABS, Take 1 tablet by mouth daily, Disp: , Rfl:     No Known Allergies    Social History     Socioeconomic History    Marital status:      Spouse name: None    Number of children: None    Years of education: None    Highest education level: None   Occupational History    None   Social Needs    Financial resource strain: None    Food insecurity:     Worry: None     Inability: None    Transportation needs:     Medical: None     Non-medical: None   Tobacco Use    Smoking status: Former Smoker     Packs/day: 0 50     Years: 25 00     Pack years: 12 50     Types: Cigarettes     Last attempt to quit: 2015     Years since quitting: 3 8    Smokeless tobacco: Never Used   Substance and Sexual Activity    Alcohol use: Yes     Frequency: Monthly or less     Comment: Social    Drug use: No    Sexual activity: Yes   Lifestyle    Physical activity:     Days per week: None     Minutes per session: None    Stress: None   Relationships    Social connections:     Talks on phone: None     Gets together: None     Attends Ten[employer] service: None     Active member of club or organization: None     Attends meetings of clubs or organizations: None     Relationship status: None    Intimate partner violence:     Fear of current or ex partner: None     Emotionally abused: None     Physically abused: None     Forced sexual activity: None   Other Topics Concern    None   Social History Narrative    None       Family History   Problem Relation Age of Onset    Diabetes Father         borderline    Hypothyroidism Father     Prostate cancer Father     Hypothyroidism Sister     Fibromyalgia Sister     Heart disease Family     Ovarian cancer Mother        PHQ-9 Depression Screening    PHQ-9:    Frequency of the following problems over the past two weeks:       Little interest or pleasure in doing things:  0 - not at all  Feeling down, depressed, or hopeless:  0 - not at all  Trouble falling or staying asleep, or sleeping too much:  2 - more than half the days  Feeling tired or having little energy:  0 - not at all  Poor appetite or overeatin - not at all  Feeling bad about yourself - or that you are a failure or have let yourself or your family down:  0 - not at all  Trouble concentrating on things, such as reading the newspaper or watching television:  0 - not at all  Moving or speaking so slowly that other people could have noticed  Or the opposite - being so fidgety or restless that you have been moving around a lot more than usual:  0 - not at all  Thoughts that you would be better off dead, or of hurting yourself in some way:  0 - not at all  PHQ-2 Score:  0  PHQ-9 Score:  2         Health Maintenance   Topic Date Due    CRC Screening: Colonoscopy  1965    INFLUENZA VACCINE  07/01/2019    DTaP,Tdap,and Td Vaccines (1 - Tdap) 04/16/2020 (Originally 4/14/1988)    BMI: Followup Plan  04/19/2020    BMI: Adult  10/11/2020    Pneumococcal Vaccine: 65+ Years (1 of 2 - PCV13) 04/14/2032    Pneumococcal Vaccine: Pediatrics (0 to 5 Years) and At-Risk Patients (6 to 59 Years)  Aged Out    HEPATITIS B VACCINES  Aged [employer] History   Administered Date(s) Administered    H1N1, All Formulations 01/14/2010    INFLUENZA 12/07/2015, 11/28/2016    Influenza Quadrivalent Preservative Free 3 years and older IM 12/07/2015    Influenza Quadrivalent, 6-35 Months IM 11/28/2016    Influenza, recombinant, quadrivalent,injectable, preservative free 12/21/2018, 10/11/2019        Objective:  Vitals:    10/11/19 0759 10/11/19 0902   BP: 130/90 132/90   BP Location: Left arm    Patient Position: Sitting    Cuff Size: Large    Pulse: 70    Temp: 98 4 °F (36 9 °C)    TempSrc: Oral    SpO2: 98%    Weight: 116 kg (254 lb 12 8 oz)    Height: 5' 11 75" (1 822 m)      Wt Readings from Last 3 Encounters:   10/11/19 116 kg (254 lb 12 8 oz)   04/19/19 117 kg (258 lb 9 6 oz)   12/21/18 115 kg (254 lb 6 4 oz)     Body mass index is 34 8 kg/m²  No exam data present       Physical Exam   Constitutional: He appears well-developed and well-nourished  No distress  Alert pleasant cooperative  Seated in room in no acute distress   HENT:   Head: Normocephalic and atraumatic     Mouth/Throat: Oropharynx is clear and moist  No oropharyngeal exudate  Moist mucous membranes  Normal posterior pharynx   Eyes: Pupils are equal, round, and reactive to light  Right eye exhibits no discharge  Left eye exhibits no discharge  No scleral icterus  Neck: Neck supple  Cardiovascular: Normal rate, regular rhythm and normal heart sounds  No murmur heard  Regular rate and rhythm  No audible murmur   Pulmonary/Chest: Effort normal and breath sounds normal  No respiratory distress  He has no wheezes  He has no rales  Clear to auscultation throughout  No crackles no rhonchi no wheeze   Abdominal: Soft  Bowel sounds are normal  He exhibits no distension  There is no tenderness  There is no guarding  Positive bowel sounds  Soft nontender nondistended   Musculoskeletal: He exhibits no edema  Neurological: He is alert  Skin: Skin is warm and dry  He is not diaphoretic  Psychiatric: He has a normal mood and affect  His behavior is normal  Thought content normal  His mood appears not anxious  His speech is not rapid and/or pressured and not slurred  He is not agitated and not aggressive  Thought content is not paranoid and not delusional  Cognition and memory are not impaired  He does not exhibit a depressed mood  He expresses no homicidal and no suicidal ideation  Nursing note and vitals reviewed            Future Appointments   Date Time Provider Jaqueline Taylor   11/15/2019  7:30 AM Buddy Nunez PA-C [facility] [address]    Patient Care Team:  Buddy Nunez PA-C as PCP - General (Internal Medicine)

## 2019-10-11 NOTE — ASSESSMENT & PLAN NOTE
MRI 3/2019 completed  Patient due for 6 month MRI follow up> Patient has order for MRI and was directed to schedule  He plans to get this done by end of the year    LFTs normal

## 2019-10-11 NOTE — ASSESSMENT & PLAN NOTE
Patient considering colonoscopy  Patient has previous GI referral however uncertain if his GI referral is still good  New referral placed today    Discussed risks and benefits of screening

## 2019-10-11 NOTE — ASSESSMENT & PLAN NOTE
Discussed importance of low-salt diet weight loss and regular exercise to help improve blood pressure  Patient's BMI 34  Discussed BMI goal of less than 30  Advised patient to start to check his blood pressure at home  Patient's naomi works as a medical assistant and can check his blood pressure  Keep a blood pressure log to bring into next follow-up  Discussed goal blood pressure is systolic (upper number) less than 464 diastolic (lower number) less than 80  Will wee patient back to review BP log in 1-2 months

## 2019-10-11 NOTE — ASSESSMENT & PLAN NOTE
A1C unchanged form last lab (5 9)  Encourage diet, lifestyle and exercise to help improve A1C, will continue to follow

## 2019-10-11 NOTE — PATIENT INSTRUCTIONS
Needs flu shot  Risks and benefits discussed  Flu shot given today  Abnormal liver ultrasound  MRI 3/2019 completed  Patient due for 6 month MRI follow up> Patient has order for MRI and was directed to schedule  He plans to get this done by end of the year  LFTs normal       Impaired fasting glucose  A1C unchanged form last lab>  Encourage diet, lifestyle and exercise to help improve A1C, will continue to follow  Abdominal muscle strain  Sx fully resolved  Elevated bilirubin  Returned to normal   Will resolve this issue  Dyslipidemia  Well controlled on Lipitor  No med changes  Encouraged weight loss diet and lifestyle  Will continue to follow  Blood pressure elevated without history of HTN  Discussed importance of low-salt diet weight loss and regular exercise to help improve blood pressure  Patient's BMI 34  Discussed BMI goal of less than 30  Advised patient to start to check his blood pressure at home  Patient's naomi works as a medical assistant and can check his blood pressure  Keep a blood pressure log to bring into next follow-up  Discussed goal blood pressure is systolic (upper number) less than 297 diastolic (lower number) less than 80  Will wee patient back to review BP log in 1-2 months  Screening for malignant neoplasm of colon  Patient considering colonoscopy  Patient has previous GI referral however uncertain if his GI referral is still good  New referral placed today    Discussed risks and benefits of screening

## 2019-10-11 NOTE — ASSESSMENT & PLAN NOTE
Well controlled on Lipitor  No med changes  Encouraged weight loss diet and lifestyle  Will continue to follow

## 2019-11-11 DIAGNOSIS — F32.A DEPRESSION, UNSPECIFIED DEPRESSION TYPE: ICD-10-CM

## 2019-11-11 DIAGNOSIS — E78.5 DYSLIPIDEMIA: ICD-10-CM

## 2019-11-11 RX ORDER — ATORVASTATIN CALCIUM 20 MG/1
20 TABLET, FILM COATED ORAL DAILY
Qty: 90 TABLET | Refills: 0 | Status: SHIPPED | OUTPATIENT
Start: 2019-11-11 | End: 2020-02-18 | Stop reason: SDUPTHER

## 2019-11-11 RX ORDER — BUPROPION HYDROCHLORIDE 300 MG/1
300 TABLET ORAL DAILY
Qty: 90 TABLET | Refills: 0 | Status: SHIPPED | OUTPATIENT
Start: 2019-11-11 | End: 2020-02-18 | Stop reason: SDUPTHER

## 2019-11-15 ENCOUNTER — OFFICE VISIT (OUTPATIENT)
Dept: INTERNAL MEDICINE CLINIC | Facility: CLINIC | Age: 54
End: 2019-11-15
Payer: COMMERCIAL

## 2019-11-15 VITALS
HEIGHT: 72 IN | WEIGHT: 259.4 LBS | DIASTOLIC BLOOD PRESSURE: 84 MMHG | SYSTOLIC BLOOD PRESSURE: 118 MMHG | BODY MASS INDEX: 35.13 KG/M2 | TEMPERATURE: 99.1 F | HEART RATE: 78 BPM | OXYGEN SATURATION: 98 %

## 2019-11-15 DIAGNOSIS — R03.0 BLOOD PRESSURE ELEVATED WITHOUT HISTORY OF HTN: ICD-10-CM

## 2019-11-15 DIAGNOSIS — R06.83 SNORING: ICD-10-CM

## 2019-11-15 DIAGNOSIS — E78.5 DYSLIPIDEMIA: ICD-10-CM

## 2019-11-15 DIAGNOSIS — R93.2 ABNORMAL LIVER ULTRASOUND: Primary | ICD-10-CM

## 2019-11-15 PROCEDURE — 99213 OFFICE O/P EST LOW 20 MIN: CPT | Performed by: PHYSICIAN ASSISTANT

## 2019-11-15 PROCEDURE — 1036F TOBACCO NON-USER: CPT | Performed by: PHYSICIAN ASSISTANT

## 2019-11-15 NOTE — ASSESSMENT & PLAN NOTE
Patient previously had MRI of abdomen 3/2019 recommending 6 month follow up  Advised him to reschedule repeat MRI  Discussed importance of rescheduling  Let me know if you need any note from work to get this testing done

## 2019-11-15 NOTE — PATIENT INSTRUCTIONS
Dyslipidemia  Lipid panel previously checked October 2019  These labs were previously reviewed with patient  Given lab order for patient to go for repeat lipid panel prior to his follow-up in 4 months    Blood pressure elevated without history of HTN  Blood pressure improved  Discussed at length the importance of diet lifestyle modifications  Recommended dash diet  Recommended daily activity  Avoid pre prepared meals which often contain large amounts of salt  Advised him to go for sleep study which he notes he has an order for at home and will schedule in the future  Discussed importance of keeping BP log to bring to follow up in 4 months  Abnormal liver ultrasound  Patient previously had MRI of abdomen 3/2019 recommending 6 month follow up  Advised him to reschedule repeat MRI  Discussed importance of rescheduling  Let me know if you need any note from work to get this testing done

## 2019-11-15 NOTE — ASSESSMENT & PLAN NOTE
Lipid panel previously checked October 2019  These labs were previously reviewed with patient    Given lab order for patient to go for repeat lipid panel prior to his follow-up in 4 months

## 2019-11-15 NOTE — ASSESSMENT & PLAN NOTE
Blood pressure improved  Discussed at length the importance of diet lifestyle modifications  Recommended dash diet  Recommended daily activity  Avoid pre-prepared meals which often contain large amounts of salt  Advised him to go for sleep study which he notes he has an order for at home and will schedule in the future  Discussed importance of keeping BP log to bring to follow up in 4 months

## 2019-11-15 NOTE — PROGRESS NOTES
[facility] [address] [place] [address]  Standard Office Visit  Patient ID: Wing Correa    : 1965  Age/Gender: 48 y o  male     DATE: 11/15/2019      Assessment/Plan:    Dyslipidemia  Lipid panel previously checked 2019  These labs were previously reviewed with patient  Given lab order for patient to go for repeat lipid panel prior to his follow-up in 4 months    Blood pressure elevated without history of HTN  Blood pressure improved  Discussed at length the importance of diet lifestyle modifications  Recommended dash diet  Recommended daily activity  Avoid pre-prepared meals which often contain large amounts of salt  Advised him to go for sleep study which he notes he has an order for at home and will schedule in the future  Discussed importance of keeping BP log to bring to follow up in 4 months  Abnormal liver ultrasound  Patient previously had MRI of abdomen 3/2019 recommending 6 month follow up  Advised him to reschedule repeat MRI  Discussed importance of rescheduling  Let me know if you need any note from work to get this testing done  Snoring  Recommended weight loss  Encouraged him to schedule sleep study  Diagnoses and all orders for this visit:    Abnormal liver ultrasound    Blood pressure elevated without history of HTN  -     CBC and differential; Future    Dyslipidemia  -     Lipid Panel with Direct LDL reflex; Future  -     Comprehensive metabolic panel; Future  -     CBC and differential; Future    Snoring          Subjective:   Chief Complaint   Patient presents with    Follow-up     Pt is here to fu on blood pressure, no refills needed  Wing Correa is a 48 y o  male who presents to the office on 11/15/2019 for     For follow up  Life has been stressful but good  Boss changing at work  He notes he has not been checking his BP at home other than this morning he had his girlfriend check his BP    HE notes he has not adjusted anything in his life or diet since our last visit  Notes he need to have a better attitude about it  He notes he he eats cereal in the morning with coffee  Lunch he packs his lunch  He does cheat with fast food on occasion a couple times per week  Kristin olvera  Gets home later in evening and eats later on some nights  Not doing any formal exercise  Notes he knows that he needs to make those changes as well  He notes that he has not yet gone for repeat MRI of the abdomen that was previously ordered  He has not yet scheduled his sleep study  He is orders for both test at home and plans to get them done but is working to try and coordinate his work schedule  Patient notes he does not have any new complaints or concerns at the current moment  Hypertension   This is a new problem  The problem has been waxing and waning since onset  Associated symptoms include anxiety (some anxiety about his pet who is aging and may have to be put to sleep   )  Pertinent negatives include no blurred vision, chest pain, headaches, malaise/fatigue, orthopnea, palpitations, peripheral edema or shortness of breath  There are no associated agents to hypertension  Risk factors for coronary artery disease include dyslipidemia, male gender and obesity  Past treatments include nothing  The current treatment provides no improvement  Compliance problems include diet  Hyperlipidemia   This is a chronic problem  Exacerbating diseases include obesity  Pertinent negatives include no chest pain or shortness of breath  Current antihyperlipidemic treatment includes statins  Risk factors for coronary artery disease include stress, male sex, obesity and dyslipidemia         The following portions of the patient's history were reviewed and updated as appropriate: allergies, current medications, past family history, past medical history, past social history, past surgical history and problem list     Review of Systems Constitutional: Negative for fever and malaise/fatigue  Eyes: Negative for blurred vision  Respiratory: Negative for cough, shortness of breath and wheezing  Cardiovascular: Negative for chest pain, palpitations and orthopnea  Gastrointestinal: Negative for abdominal pain, diarrhea, nausea and vomiting  Neurological: Negative for dizziness, syncope and headaches           Patient Active Problem List   Diagnosis    Depression    Dyslipidemia    Impaired fasting glucose    Obesity (BMI 35 0-39 9 without comorbidity)    Snoring    Decreased hearing of both ears    Abnormal liver ultrasound    Screening for malignant neoplasm of colon    Needs flu shot    Blood pressure elevated without history of HTN       Past Medical History:   Diagnosis Date    Acute suppurative otitis media of left ear without spontaneous rupture of tympanic membrane 1/22/2018    Depression     HL (hearing loss)     Hypertension     Obesity (BMI 35 0-39 9 without comorbidity) 1/16/2017       Past Surgical History:   Procedure Laterality Date    WISDOM TOOTH EXTRACTION           Current Outpatient Medications:     aspirin 81 MG tablet, Take 1 tablet by mouth daily, Disp: , Rfl:     atorvastatin (LIPITOR) 20 mg tablet, Take 1 tablet (20 mg total) by mouth daily, Disp: 90 tablet, Rfl: 0    buPROPion (WELLBUTRIN XL) 300 mg 24 hr tablet, Take 1 tablet (300 mg total) by mouth daily, Disp: 90 tablet, Rfl: 0    co-enzyme Q-10 30 MG capsule, Take 30 mg by mouth daily, Disp: , Rfl:     Green Tea 150 MG CAPS, Take by mouth daily, Disp: , Rfl:     Magnesium 100 MG TABS, Take 1 tablet by mouth daily, Disp: , Rfl:     Multiple Vitamins-Minerals (MULTIVITAMIN ADULT) TABS, Take 1 tablet by mouth daily, Disp: , Rfl:     Omega-3 Fatty Acids ([employer] FISH OIL) 1000 MG CAPS, Take 2 capsules by mouth 2 (two) times a day, Disp: , Rfl:     Turmeric Curcumin 500 MG CAPS, Take 1 capsule by mouth daily, Disp: , Rfl:     venlafaxine (EFFEXOR-XR) 150 mg 24 hr capsule, Take 1 capsule (150 mg total) by mouth daily, Disp: 90 capsule, Rfl: 1    No Known Allergies    Social History     Socioeconomic History    Marital status:      Spouse name: None    Number of children: None    Years of education: None    Highest education level: None   Occupational History    None   Social Needs    Financial resource strain: None    Food insecurity:     Worry: None     Inability: None    Transportation needs:     Medical: None     Non-medical: None   Tobacco Use    Smoking status: Former Smoker     Packs/day: 0 50     Years: 25 00     Pack years: 12 50     Types: Cigarettes     Last attempt to quit: 12/2015     Years since quitting: 3 9    Smokeless tobacco: Never Used   Substance and Sexual Activity    Alcohol use: Yes     Frequency: Monthly or less     Comment: Social    Drug use: No    Sexual activity: Yes   Lifestyle    Physical activity:     Days per week: None     Minutes per session: None    Stress: None   Relationships    Social connections:     Talks on phone: None     Gets together: None     Attends Nondenominational service: None     Active member of club or organization: None     Attends meetings of clubs or organizations: None     Relationship status: None    Intimate partner violence:     Fear of current or ex partner: None     Emotionally abused: None     Physically abused: None     Forced sexual activity: None   Other Topics Concern    None   Social History Narrative    None       Family History   Problem Relation Age of Onset    Diabetes Father         borderline    Hypothyroidism Father     Prostate cancer Father     Heart disease Father     Hypothyroidism Sister     Fibromyalgia Sister     Heart disease Sister     Ovarian cancer Mother        PHQ-9 Depression Screening    PHQ-9:    Frequency of the following problems over the past two weeks:       Little interest or pleasure in doing things:  0 - not at all  Feeling down, depressed, or hopeless:  0 - not at all  Trouble falling or staying asleep, or sleeping too much:  0 - not at all  Feeling tired or having little energy:  0 - not at all  Poor appetite or overeatin - not at all  Feeling bad about yourself - or that you are a failure or have let yourself or your family down:  0 - not at all  Trouble concentrating on things, such as reading the newspaper or watching television:  0 - not at all  Moving or speaking so slowly that other people could have noticed   Or the opposite - being so fidgety or restless that you have been moving around a lot more than usual:  0 - not at all  Thoughts that you would be better off dead, or of hurting yourself in some way:  0 - not at all  PHQ-2 Score:  0  PHQ-9 Score:  0         Health Maintenance   Topic Date Due    CRC Screening: Colonoscopy  1965    HIV Screening  1982    DTaP,Tdap,and Td Vaccines (1 - Tdap) 2020 (Originally 1978)    BMI: Followup Plan  2020    BMI: Adult  11/15/2020    Pneumococcal Vaccine: 65+ Years (1 of 2 - PCV13) 2032    Influenza Vaccine  Completed    Pneumococcal Vaccine: Pediatrics (0 to 5 Years) and At-Risk Patients (6 to 59 Years)  Aged Out    HIB Vaccine  Aged Out    Hepatitis B Vaccine  Aged Out    IPV Vaccine  Aged Out    Hepatitis A Vaccine  Aged Out    HPV Vaccine  Aged [employer] History   Administered Date(s) Administered    H1N1, All Formulations 2010    INFLUENZA 2015, 2016    Influenza Quadrivalent Preservative Free 3 years and older IM 2015    Influenza Quadrivalent, 6-35 Months IM 2016    Influenza, recombinant, quadrivalent,injectable, preservative free 2018, 10/11/2019        Objective:  Vitals:    11/15/19 0718 11/15/19 0758   BP: 122/90 118/84   BP Location: Left arm Left arm   Patient Position: Sitting Sitting   Cuff Size: Large Extra-Large   Pulse: 78    Temp: 99 1 °F (37 3 °C)    TempSrc: Oral    SpO2: 98%    Weight: 118 kg (259 lb 6 4 oz)    Height: 5' 11 75" (1 822 m)      Wt Readings from Last 3 Encounters:   11/15/19 118 kg (259 lb 6 4 oz)   10/11/19 116 kg (254 lb 12 8 oz)   04/19/19 117 kg (258 lb 9 6 oz)     Body mass index is 35 43 kg/m²  No exam data present       Physical Exam   Constitutional: He appears well-developed and well-nourished  No distress  Alert pleasant cooperative  Seated in room in no acute distress   HENT:   Head: Normocephalic and atraumatic  Mouth/Throat: Oropharynx is clear and moist  No oropharyngeal exudate  Moist mucous membranes  Normal posterior pharynx   Eyes: Pupils are equal, round, and reactive to light  Right eye exhibits no discharge  Left eye exhibits no discharge  No scleral icterus  Neck: Neck supple  Cardiovascular: Normal rate, regular rhythm and normal heart sounds  No murmur heard  Regular rate and rhythm  No audible murmurs   Pulmonary/Chest: Effort normal and breath sounds normal  No respiratory distress  He has no wheezes  He has no rales  Clear to auscultation throughout  No crackles no rhonchi no wheeze  No respiratory distress   Abdominal: Soft  Bowel sounds are normal  He exhibits no distension  There is no tenderness  There is no guarding  Positive bowel sounds  Soft nontender nondistended   Musculoskeletal: He exhibits no edema  No lower extremity edema   Neurological: He is alert  Alert and appropriate   Skin: Skin is warm and dry  He is not diaphoretic  Psychiatric: He has a normal mood and affect  His behavior is normal  Thought content normal    Nursing note and vitals reviewed            Future Appointments   Date Time Provider Jaqueline Claudia   3/20/2020  7:00 AM Muna Phipps PA-C [address] [place] [address]    Patient Care Team:  Muna Phipps PA-C as PCP - General (Internal Medicine)

## 2020-02-09 DIAGNOSIS — F32.A DEPRESSION, UNSPECIFIED DEPRESSION TYPE: ICD-10-CM

## 2020-02-10 RX ORDER — VENLAFAXINE HYDROCHLORIDE 150 MG/1
CAPSULE, EXTENDED RELEASE ORAL
Qty: 90 CAPSULE | Refills: 4 | OUTPATIENT
Start: 2020-02-10

## 2020-02-12 DIAGNOSIS — F32.A DEPRESSION, UNSPECIFIED DEPRESSION TYPE: ICD-10-CM

## 2020-02-12 RX ORDER — VENLAFAXINE HYDROCHLORIDE 150 MG/1
150 CAPSULE, EXTENDED RELEASE ORAL DAILY
Qty: 90 CAPSULE | Refills: 1 | Status: SHIPPED | OUTPATIENT
Start: 2020-02-12 | End: 2020-10-27 | Stop reason: SDUPTHER

## 2020-02-18 DIAGNOSIS — E78.5 DYSLIPIDEMIA: ICD-10-CM

## 2020-02-18 DIAGNOSIS — F32.A DEPRESSION, UNSPECIFIED DEPRESSION TYPE: ICD-10-CM

## 2020-02-19 RX ORDER — BUPROPION HYDROCHLORIDE 300 MG/1
300 TABLET ORAL DAILY
Qty: 90 TABLET | Refills: 1 | Status: SHIPPED | OUTPATIENT
Start: 2020-02-19 | End: 2020-07-22 | Stop reason: SDUPTHER

## 2020-02-19 RX ORDER — ATORVASTATIN CALCIUM 20 MG/1
20 TABLET, FILM COATED ORAL DAILY
Qty: 90 TABLET | Refills: 1 | Status: SHIPPED | OUTPATIENT
Start: 2020-02-19 | End: 2020-07-22 | Stop reason: SDUPTHER

## 2020-03-05 ENCOUNTER — TELEPHONE (OUTPATIENT)
Dept: INTERNAL MEDICINE CLINIC | Facility: CLINIC | Age: 55
End: 2020-03-05

## 2020-03-16 DIAGNOSIS — Z12.11 SCREENING FOR MALIGNANT NEOPLASM OF COLON: Primary | ICD-10-CM

## 2020-03-17 LAB
ALBUMIN SERPL-MCNC: 4.5 G/DL (ref 3.6–5.1)
ALBUMIN/GLOB SERPL: 2.1 (CALC) (ref 1–2.5)
ALP SERPL-CCNC: 46 U/L (ref 35–144)
ALT SERPL-CCNC: 24 U/L (ref 9–46)
AST SERPL-CCNC: 25 U/L (ref 10–35)
BASOPHILS # BLD AUTO: 40 CELLS/UL (ref 0–200)
BASOPHILS NFR BLD AUTO: 0.6 %
BILIRUB SERPL-MCNC: 1.1 MG/DL (ref 0.2–1.2)
BUN SERPL-MCNC: 11 MG/DL (ref 7–25)
BUN/CREAT SERPL: ABNORMAL (CALC) (ref 6–22)
CALCIUM SERPL-MCNC: 9.3 MG/DL (ref 8.6–10.3)
CHLORIDE SERPL-SCNC: 101 MMOL/L (ref 98–110)
CHOLEST SERPL-MCNC: 139 MG/DL
CHOLEST/HDLC SERPL: 2.6 (CALC)
CO2 SERPL-SCNC: 32 MMOL/L (ref 20–32)
CREAT SERPL-MCNC: 0.96 MG/DL (ref 0.7–1.33)
EOSINOPHIL # BLD AUTO: 141 CELLS/UL (ref 15–500)
EOSINOPHIL NFR BLD AUTO: 2.1 %
ERYTHROCYTE [DISTWIDTH] IN BLOOD BY AUTOMATED COUNT: 13.5 % (ref 11–15)
GLOBULIN SER CALC-MCNC: 2.1 G/DL (CALC) (ref 1.9–3.7)
GLUCOSE SERPL-MCNC: 104 MG/DL (ref 65–99)
HCT VFR BLD AUTO: 43.7 % (ref 38.5–50)
HDLC SERPL-MCNC: 54 MG/DL
HGB BLD-MCNC: 14.6 G/DL (ref 13.2–17.1)
LDLC SERPL CALC-MCNC: 69 MG/DL (CALC)
LYMPHOCYTES # BLD AUTO: 1943 CELLS/UL (ref 850–3900)
LYMPHOCYTES NFR BLD AUTO: 29 %
MCH RBC QN AUTO: 31.3 PG (ref 27–33)
MCHC RBC AUTO-ENTMCNC: 33.4 G/DL (ref 32–36)
MCV RBC AUTO: 93.8 FL (ref 80–100)
MONOCYTES # BLD AUTO: 570 CELLS/UL (ref 200–950)
MONOCYTES NFR BLD AUTO: 8.5 %
NEUTROPHILS # BLD AUTO: 4007 CELLS/UL (ref 1500–7800)
NEUTROPHILS NFR BLD AUTO: 59.8 %
NONHDLC SERPL-MCNC: 85 MG/DL (CALC)
PLATELET # BLD AUTO: 206 THOUSAND/UL (ref 140–400)
PMV BLD REES-ECKER: 11.1 FL (ref 7.5–12.5)
POTASSIUM SERPL-SCNC: 4.4 MMOL/L (ref 3.5–5.3)
PROT SERPL-MCNC: 6.6 G/DL (ref 6.1–8.1)
RBC # BLD AUTO: 4.66 MILLION/UL (ref 4.2–5.8)
SL AMB EGFR AFRICAN AMERICAN: 105 ML/MIN/1.73M2
SL AMB EGFR NON AFRICAN AMERICAN: 91 ML/MIN/1.73M2
SODIUM SERPL-SCNC: 138 MMOL/L (ref 135–146)
TRIGL SERPL-MCNC: 75 MG/DL
WBC # BLD AUTO: 6.7 THOUSAND/UL (ref 3.8–10.8)

## 2020-03-20 ENCOUNTER — TELEMEDICINE (OUTPATIENT)
Dept: INTERNAL MEDICINE CLINIC | Facility: CLINIC | Age: 55
End: 2020-03-20
Payer: COMMERCIAL

## 2020-03-20 DIAGNOSIS — Z12.5 SCREENING FOR MALIGNANT NEOPLASM OF PROSTATE: ICD-10-CM

## 2020-03-20 DIAGNOSIS — R73.01 IMPAIRED FASTING GLUCOSE: Primary | ICD-10-CM

## 2020-03-20 DIAGNOSIS — F32.A DEPRESSION, UNSPECIFIED DEPRESSION TYPE: ICD-10-CM

## 2020-03-20 DIAGNOSIS — R93.2 ABNORMAL LIVER ULTRASOUND: ICD-10-CM

## 2020-03-20 DIAGNOSIS — L98.9 SKIN LESION OF FACE: ICD-10-CM

## 2020-03-20 DIAGNOSIS — E66.9 OBESITY (BMI 35.0-39.9 WITHOUT COMORBIDITY): ICD-10-CM

## 2020-03-20 DIAGNOSIS — E78.5 DYSLIPIDEMIA: ICD-10-CM

## 2020-03-20 DIAGNOSIS — R03.0 BLOOD PRESSURE ELEVATED WITHOUT HISTORY OF HTN: ICD-10-CM

## 2020-03-20 DIAGNOSIS — Z80.8 FAMILY HISTORY OF SKIN CANCER: ICD-10-CM

## 2020-03-20 PROCEDURE — 99214 OFFICE O/P EST MOD 30 MIN: CPT | Performed by: PHYSICIAN ASSISTANT

## 2020-03-20 NOTE — ASSESSMENT & PLAN NOTE
Fasting glucose relatively unchanged  104 with this lab set previously 105  A1c was checked with his last lab set 5 9 in October  Will check a1c with next lab set    Discussed importance of diet lifestyle modification

## 2020-03-20 NOTE — ASSESSMENT & PLAN NOTE
Discussed with patient importance of monitoring blood pressure at home  Patient will need follow-up in 3-4 weeks to assess blood pressure goal   Discussed importance of calling our office with blood pressure log in the next 2 weeks to assess control

## 2020-03-20 NOTE — ASSESSMENT & PLAN NOTE
Patient has not gone for follow-up MRI of abdomen  MRI last performed 03/2019 recommending six-month follow-up  Patient notes he has order at home and was waiting until the new year to get his MRI  He has not yet scheduled this test   I discussed the importance of him going for follow-up MRI to see if any change  I discussed risks of delaying further evaluation

## 2020-03-20 NOTE — PROGRESS NOTES
Virtual Brief Visit    Reason for visit is 4 month follow up chronic conditions  Encounter provider Karen Rutherford PA-C    Provider located at [address] [place] [phone]      Recent Visits  No visits were found meeting these conditions  Showing recent visits within past 7 days and meeting all other requirements     Future Appointments  No visits were found meeting these conditions  Showing future appointments within next 150 days and meeting all other requirements        Patient agrees to participate in a virtual check in via telephone or video visit instead of presenting to the office to address urgent/immediate medical needs  Patient is aware this is a billable service  After connecting through telephone, the patient was identified by name and date of birth  Narinder Underwood was informed that this was a telemedicine visit and that the visit is being conducted through telephone which may not be secure and therefore might not be HIPAA-compliant  My office door was closed  No one else was in the room  He acknowledged consent and understanding of privacy and security of the virtual check-in visit  I informed the patient that I have reviewed his record in Epic and presented the opportunity for him to ask any questions regarding the visit today  The patient initiated communication and agreed to participate  Subjective  Narinder Underwood is a 48 y o  male follow up for chronic conditions  He notes he is still very busy with work  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  His coworkers are trying to maintain social distancing  Taking medications as directed  He is here to follow up labs that he did early in this week  No difficulties with current medications  Has not been checking BP at home  He notes that he is feeling well    He recalls that we did discussed the importance of him checking his blood pressure at last visit and he plans to check his blood pressures and have a log to bring to his follow-up  Has not had sleep study but he plans to schedule this test   Does not need any refills  Notes he has not made much changes with regards to diet and exercise but plans to  Trying to eat more sensibly  No other complaints or concerns at this time  Notes he has some areas of skin blemishes on his face that are becoming more noticeable as of late  He feels he has a skin tag on his face  He does have skin cancer history in both his mother and his father  He himself does not follow with a dermatologist   He has never had any previous skin cancers himself  He is interested in seeing a dermatologist   He notes that he does have the ability to take a photo and uploaded to my chart  Patient notes he was informed this morning all office visits were to be done via telephone due to Archana virus  He is agreeable to proceeding with telophone visit  Hypertension   Pertinent negatives include no chest pain, palpitations or shortness of breath  There are no associated agents to hypertension  Risk factors for coronary artery disease include obesity and male gender (impaired fasting glucose)  Past treatments include lifestyle changes  Improvement on treatment: has not been checking  Compliance problems include diet and exercise  Hyperlipidemia   This is a chronic problem  The problem is controlled  Recent lipid tests were reviewed and are normal  Exacerbating diseases include obesity  Pertinent negatives include no chest pain, myalgias or shortness of breath  Current antihyperlipidemic treatment includes statins  Compliance problems include adherence to diet  Risk factors for coronary artery disease include dyslipidemia, male sex and obesity  Depression   This is a chronic problem  The problem has been unchanged  Associated symptoms include a rash   Pertinent negatives include no abdominal pain, change in bowel habit, chest pain, coughing, fever, myalgias, nausea, sore throat or vomiting  Treatments tried: currently stable on medications  Rash   The affected locations include the face  The rash is characterized by scaling  He was exposed to nothing  Pertinent negatives include no cough, diarrhea, fever, shortness of breath, sore throat or vomiting  Past treatments include nothing  Review of Systems   Constitutional: Negative for fever  HENT: Negative for sore throat  Respiratory: Negative for cough, shortness of breath and wheezing  Cardiovascular: Negative for chest pain and palpitations  Gastrointestinal: Negative for abdominal distention, abdominal pain, change in bowel habit, diarrhea, nausea and vomiting  Notes he plans to reschedule MRI  Was waiting until the new year  Genitourinary: Negative for dysuria  Musculoskeletal: Negative for myalgias  Skin: Positive for rash  Psychiatric/Behavioral: Positive for depression  Negative for agitation  Depressive symptoms well controlled at this time  Notes that there is some increased stress with talk about COVID virus but he feels he is doing well at this time       PE:  Not performed as this was a telephone visit    Past Medical History:   Diagnosis Date    Acute suppurative otitis media of left ear without spontaneous rupture of tympanic membrane 1/22/2018    Depression     HL (hearing loss)     Hypertension     Obesity (BMI 35 0-39 9 without comorbidity) 1/16/2017       Past Surgical History:   Procedure Laterality Date    WISDOM TOOTH EXTRACTION         Current Outpatient Medications   Medication Sig Dispense Refill    aspirin 81 MG tablet Take 1 tablet by mouth daily      atorvastatin (LIPITOR) 20 mg tablet Take 1 tablet (20 mg total) by mouth daily 90 tablet 1    buPROPion (WELLBUTRIN XL) 300 mg 24 hr tablet Take 1 tablet (300 mg total) by mouth daily 90 tablet 1    co-enzyme Q-10 30 MG capsule Take 30 mg by mouth daily      Green Tea 150 MG CAPS Take by mouth daily      Magnesium 100 MG TABS Take 1 tablet by mouth daily      Multiple Vitamins-Minerals (MULTIVITAMIN ADULT) TABS Take 1 tablet by mouth daily      Omega-3 Fatty Acids (CVS FISH OIL) 1000 MG CAPS Take 2 capsules by mouth 2 (two) times a day      Turmeric Curcumin 500 MG CAPS Take 1 capsule by mouth daily      venlafaxine (EFFEXOR-XR) 150 mg 24 hr capsule Take 1 capsule (150 mg total) by mouth daily 90 capsule 1     No current facility-administered medications for this visit  No Known Allergies    Assessment    Prasad telephone assessment is See below   Impaired fasting glucose  Fasting glucose relatively unchanged  104 with this lab set previously 105  A1c was checked with his last lab set 5 9 in October  Will check a1c with next lab set  Discussed importance of diet lifestyle modification    Dyslipidemia  Lipid reviewed  LFT stable  Continue Lipitor  Encouraged low-fat low-cholesterol diet and weight loss along with regular exercise  Abnormal liver ultrasound  Patient has not gone for follow-up MRI of abdomen  MRI last performed 03/2019 recommending six-month follow-up  Patient notes he has order at home and was waiting until the new year to get his MRI  He has not yet scheduled this test   I discussed the importance of him going for follow-up MRI to see if any change  I discussed risks of delaying further evaluation  Blood pressure elevated without history of HTN  Discussed with patient importance of monitoring blood pressure at home  Patient will need follow-up in 3-4 weeks to assess blood pressure goal   Discussed importance of calling our office with blood pressure log in the next 2 weeks to assess control  Depression  Stable at this time on Effexor and Wellbutrin  No dose change  Will continue to follow  Disposition:  Please schedule with dermatologist (phone number given)  Please schedule office follow-up in 4 months with labs to be done prior  [facility] office staff will mail referral and lab slip  Please upload photo of skin tag on face to my chart for our review  Please monitor blood pressure and call back with blood pressure log  I spent 15 minutes with the patient during this virtual check-in visit    [id number]

## 2020-03-20 NOTE — PATIENT INSTRUCTIONS
Impaired fasting glucose  Fasting glucose relatively unchanged  104 with this lab set previously 105  A1c was checked with his last lab set 5 9 in October  Will check a1c with next lab set  Discussed importance of diet lifestyle modification    Dyslipidemia  Lipid reviewed  LFT stable  Continue Lipitor  Encouraged low-fat low-cholesterol diet and weight loss along with regular exercise  Abnormal liver ultrasound  Patient has not gone for follow-up MRI of abdomen  MRI last performed 03/2019 recommending six-month follow-up  Patient notes he has order at home and was waiting until the new year to get his MRI  He has not yet scheduled this test   I discussed the importance of him going for follow-up MRI to see if any change  I discussed risks of delaying further evaluation  Blood pressure elevated without history of HTN  Discussed with patient importance of monitoring blood pressure at home  Patient will need follow-up in 3-4 weeks to assess blood pressure goal   Discussed importance of calling our office with blood pressure log in the next 2 weeks to assess control  Depression  Stable at this time on Effexor and Wellbutrin  No dose change  Will continue to follow

## 2020-03-20 NOTE — ASSESSMENT & PLAN NOTE
Lipid reviewed  LFT stable  Continue Lipitor  Encouraged low-fat low-cholesterol diet and weight loss along with regular exercise

## 2020-04-24 ENCOUNTER — TELEPHONE (OUTPATIENT)
Dept: INTERNAL MEDICINE CLINIC | Facility: CLINIC | Age: 55
End: 2020-04-24

## 2020-07-22 ENCOUNTER — TELEPHONE (OUTPATIENT)
Dept: INTERNAL MEDICINE CLINIC | Facility: CLINIC | Age: 55
End: 2020-07-22

## 2020-07-22 DIAGNOSIS — E78.5 DYSLIPIDEMIA: ICD-10-CM

## 2020-07-22 DIAGNOSIS — F32.A DEPRESSION, UNSPECIFIED DEPRESSION TYPE: ICD-10-CM

## 2020-07-22 RX ORDER — BUPROPION HYDROCHLORIDE 300 MG/1
300 TABLET ORAL DAILY
Qty: 90 TABLET | Refills: 0 | Status: SHIPPED | OUTPATIENT
Start: 2020-07-22 | End: 2020-12-04 | Stop reason: SDUPTHER

## 2020-07-22 RX ORDER — ATORVASTATIN CALCIUM 20 MG/1
20 TABLET, FILM COATED ORAL DAILY
Qty: 90 TABLET | Refills: 0 | Status: SHIPPED | OUTPATIENT
Start: 2020-07-22 | End: 2020-10-27 | Stop reason: SDUPTHER

## 2020-07-22 NOTE — TELEPHONE ENCOUNTER
LOV: 3/20/20  NOV: LYLY for [place] [facility] to call back to schedule his 4 month f/u    Just sent in 90 days with no refills

## 2020-07-25 LAB
ALBUMIN SERPL-MCNC: 4.4 G/DL (ref 3.6–5.1)
ALBUMIN/GLOB SERPL: 1.8 (CALC) (ref 1–2.5)
ALP SERPL-CCNC: 44 U/L (ref 35–144)
ALT SERPL-CCNC: 27 U/L (ref 9–46)
AST SERPL-CCNC: 20 U/L (ref 10–35)
BILIRUB SERPL-MCNC: 0.9 MG/DL (ref 0.2–1.2)
BUN SERPL-MCNC: 16 MG/DL (ref 7–25)
BUN/CREAT SERPL: ABNORMAL (CALC) (ref 6–22)
CALCIUM SERPL-MCNC: 9.5 MG/DL (ref 8.6–10.3)
CHLORIDE SERPL-SCNC: 103 MMOL/L (ref 98–110)
CHOLEST SERPL-MCNC: 162 MG/DL
CHOLEST/HDLC SERPL: 2.6 (CALC)
CO2 SERPL-SCNC: 28 MMOL/L (ref 20–32)
CREAT SERPL-MCNC: 0.98 MG/DL (ref 0.7–1.33)
EST. AVERAGE GLUCOSE BLD GHB EST-MCNC: 123 (CALC)
EST. AVERAGE GLUCOSE BLD GHB EST-SCNC: 6.8 (CALC)
GLOBULIN SER CALC-MCNC: 2.4 G/DL (CALC) (ref 1.9–3.7)
GLUCOSE SERPL-MCNC: 125 MG/DL (ref 65–99)
HBA1C MFR BLD: 5.9 % OF TOTAL HGB
HDLC SERPL-MCNC: 62 MG/DL
LDLC SERPL CALC-MCNC: 80 MG/DL (CALC)
NONHDLC SERPL-MCNC: 100 MG/DL (CALC)
POTASSIUM SERPL-SCNC: 4.6 MMOL/L (ref 3.5–5.3)
PROT SERPL-MCNC: 6.8 G/DL (ref 6.1–8.1)
PSA SERPL-MCNC: 0.7 NG/ML
SL AMB EGFR AFRICAN AMERICAN: 102 ML/MIN/1.73M2
SL AMB EGFR NON AFRICAN AMERICAN: 88 ML/MIN/1.73M2
SODIUM SERPL-SCNC: 138 MMOL/L (ref 135–146)
TRIGL SERPL-MCNC: 107 MG/DL

## 2020-07-31 ENCOUNTER — OFFICE VISIT (OUTPATIENT)
Dept: INTERNAL MEDICINE CLINIC | Facility: CLINIC | Age: 55
End: 2020-07-31
Payer: COMMERCIAL

## 2020-07-31 VITALS
WEIGHT: 252.2 LBS | SYSTOLIC BLOOD PRESSURE: 134 MMHG | BODY MASS INDEX: 34.16 KG/M2 | TEMPERATURE: 97.1 F | HEIGHT: 72 IN | HEART RATE: 74 BPM | OXYGEN SATURATION: 97 % | DIASTOLIC BLOOD PRESSURE: 84 MMHG

## 2020-07-31 DIAGNOSIS — R06.83 SNORING: ICD-10-CM

## 2020-07-31 DIAGNOSIS — R73.01 IMPAIRED FASTING GLUCOSE: ICD-10-CM

## 2020-07-31 DIAGNOSIS — E78.5 DYSLIPIDEMIA: Primary | ICD-10-CM

## 2020-07-31 DIAGNOSIS — F32.A DEPRESSION, UNSPECIFIED DEPRESSION TYPE: ICD-10-CM

## 2020-07-31 DIAGNOSIS — R03.0 BLOOD PRESSURE ELEVATED WITHOUT HISTORY OF HTN: ICD-10-CM

## 2020-07-31 DIAGNOSIS — L98.9 SKIN LESION OF FACE: ICD-10-CM

## 2020-07-31 DIAGNOSIS — R93.2 ABNORMAL LIVER ULTRASOUND: ICD-10-CM

## 2020-07-31 PROCEDURE — 3008F BODY MASS INDEX DOCD: CPT | Performed by: PHYSICIAN ASSISTANT

## 2020-07-31 PROCEDURE — 99214 OFFICE O/P EST MOD 30 MIN: CPT | Performed by: PHYSICIAN ASSISTANT

## 2020-07-31 PROCEDURE — 1036F TOBACCO NON-USER: CPT | Performed by: PHYSICIAN ASSISTANT

## 2020-07-31 NOTE — ASSESSMENT & PLAN NOTE
Discussed importance of checking blood pressure at home  Discussed blood pressure goal of less than 130 over less than 80  Discussed low-salt diet and daily exercise which can also improved blood pressure  Discussed weight loss which can improve blood pressure  Go for sleep study  If blood pressure is not at goal report back sooner than four-month follow-up  Check blood pressure at home and keep log

## 2020-07-31 NOTE — ASSESSMENT & PLAN NOTE
Patient notes he has not yet scheduled dermatology but plans to do so  He has a referral with jane plans to schedule  I discussed importance of scheduling with dermatology and report back to our office if you are having any difficulty scheduling

## 2020-07-31 NOTE — ASSESSMENT & PLAN NOTE
Reviewed lipid panel  Continue Lipitor  LFT stable  Continue fish oil  Commended on weight loss  Continue low-fat low-cholesterol diet

## 2020-07-31 NOTE — ASSESSMENT & PLAN NOTE
Patient has order for sleep study and plans to get this scheduled in the upcoming months  Discussed how weight loss can also help with obstructive sleep symptoms  Discussed importance of being evaluated for possible sleep apnea as it can affect weight blood pressure cholesterol etc

## 2020-07-31 NOTE — ASSESSMENT & PLAN NOTE
Previous MRI performed 03/2019 recommending six-month follow-up  Patient has not yet gotten to schedule his follow-up MRI due to current pandemic but plans to reschedule  Place new order for MRI of abdomen today  LFT normal   Discussed importance of going for MRI to further evaluate for any change  Patient verbalized understanding is willing    Discussed risks of further delay follow-up imaging

## 2020-07-31 NOTE — ASSESSMENT & PLAN NOTE
Commended patient on weight loss  Encouraged physical activity along with diet lifestyle to help manage weight

## 2020-07-31 NOTE — ASSESSMENT & PLAN NOTE
Reviewed labs  Patient's A1c 5 9  Recommended low carb diet  Gave encouragement and commended patient on weight loss  Discuss low carb lower lobe sugar options and the importance of daily exercise

## 2020-07-31 NOTE — PROGRESS NOTES
[facility]  Standard Office Visit  Patient ID: Duane Payne    : 1965  Age/Gender: 50 y o  male     DATE: 2020      Assessment/Plan:    Impaired fasting glucose  Reviewed labs  Patient's A1c 5 9  Recommended low carb diet  Gave encouragement and commended patient on weight loss  Discuss low carb lower lobe sugar options and the importance of daily exercise  Snoring  Patient has order for sleep study and plans to get this scheduled in the upcoming months  Discussed how weight loss can also help with obstructive sleep symptoms  Discussed importance of being evaluated for possible sleep apnea as it can affect weight blood pressure cholesterol etc  Dyslipidemia  Reviewed lipid panel  Continue Lipitor  LFT stable  Continue fish oil  Commended on weight loss  Continue low-fat low-cholesterol diet  Depression  Stable at this time on Wellbutrin and Effexor  No medication changes    Blood pressure elevated without history of HTN  Discussed importance of checking blood pressure at home  Discussed blood pressure goal of less than 130 over less than 80  Discussed low-salt diet and daily exercise which can also improved blood pressure  Discussed weight loss which can improve blood pressure  Go for sleep study  If blood pressure is not at goal report back sooner than four-month follow-up  Check blood pressure at home and keep log  Abnormal liver ultrasound  Previous MRI performed 2019 recommending six-month follow-up  Patient has not yet gotten to schedule his follow-up MRI due to current pandemic but plans to reschedule  Place new order for MRI of abdomen today  LFT normal   Discussed importance of going for MRI to further evaluate for any change  Patient verbalized understanding is willing    Discussed risks of further delay follow-up imaging    Skin lesion of face  Patient notes he has not yet scheduled dermatology but plans to do so  He has a referral with jane plans to schedule  I discussed importance of scheduling with dermatology and report back to our office if you are having any difficulty scheduling  BMI 34 0-34 9,adult  Commended patient on weight loss  Encouraged physical activity along with diet lifestyle to help manage weight  Diagnoses and all orders for this visit:    Dyslipidemia  -     Lipid Panel with Direct LDL reflex; Future  -     Comprehensive metabolic panel; Future    Blood pressure elevated without history of HTN  -     Comprehensive metabolic panel; Future  -     CBC and differential; Future    Depression, unspecified depression type    Abnormal liver ultrasound  -     MRI abdomen w wo contrast; Future    BMI 34 0-34 9,adult    Snoring    Impaired fasting glucose    Skin lesion of face  Comments:  Patient has dermatology referral and plans to call and schedule with provider  Discussed importance of scheduling with dermatology especially with [id number] [place],[address]          BMI Counseling: Body mass index is 34 44 kg/m²  The BMI is above normal  Nutrition recommendations include encouraging healthy choices of fruits and vegetables, limiting drinks that contain sugar, reducing intake of saturated and trans fat and reducing intake of cholesterol  Exercise recommendations include exercising 3-5 times per week  No pharmacotherapy was ordered  Patient referred to PCP due to patient being overweight  Subjective:   Chief Complaint   Patient presents with    Follow-up     Patient is here for f/u chronic conditions and review blood work  Pt does not have any new medical concerns  Due for BMI f/u          Jesse Mendenhall is a 50 y o  male who presents to the office on 7/31/2020 for     For routine follow up and to review labs  NO complaints or concerns at this time  Some increased work stress with pandemic  Anxiety/depressive sx have been concerned        Henotes he put off getting the MRI abdomen done due to being furlowed  Plans to get MRI abdomen done by end of the year  He notes he has a skin lesion under his right cheek  He has a derm referral but has not yet scheduled  Putting the soft due to COVID with plans to get back in  The area is not painful but is now visible and previously had not been visible  He notes that with his family history he should be seeing a dermatologist anyway  He called and left message for an apt  Notes the lesion is more prevalent now and was not there 1 year ago  Increasing in size  Has not been checking BP at home but plans to  Has not gotten sleep study done  Patient did recently have Cologuard  No GI complaints  Bowel movements have been regular  Bowel movement morning daily  No abdominal pain  No muscle aches taking coenzyme Q10 and Lipitor  Patient notes he does snore at night  Was not able to get sleep study  Celeste Reeder works at a pulmonologist office and plans to help him get this appointment scheduled  His fiancee has been laid off of work due to [employer]  Patient is not check blood pressure at home but is aware he should be doing so  Hyperlipidemia   This is a chronic problem  The current episode started more than 1 year ago  The problem is uncontrolled  Recent lipid tests were reviewed and are low  Exacerbating diseases include obesity  He has no history of diabetes, hypothyroidism or liver disease  Pertinent negatives include no chest pain, leg pain or shortness of breath  Current antihyperlipidemic treatment includes statins  The current treatment provides significant improvement of lipids  Compliance problems include adherence to diet and adherence to exercise  Hypertension   The problem has been waxing and waning since onset  Associated symptoms include anxiety ( anxiety controlled)  Pertinent negatives include no chest pain, headaches, malaise/fatigue, palpitations, peripheral edema or shortness of breath   Risk factors for coronary artery disease include male gender, obesity and dyslipidemia  Past treatments include lifestyle changes  Compliance problems include exercise  Depression   This is a chronic problem  The problem occurs intermittently  The problem has been waxing and waning  Pertinent negatives include no abdominal pain, chest pain, chills, coughing, headaches, nausea, numbness, vomiting or weakness  Exacerbated by: Life stressors  The following portions of the patient's history were reviewed and updated as appropriate: allergies, current medications, past family history, past medical history, past social history, past surgical history and problem list     Review of Systems   Constitutional: Negative for chills, malaise/fatigue and unexpected weight change  Patient notes he has had some increased physical activity now he is tending to a garden which has been a good hobby  He notes that he has multiple tomato plants which are thriving that he has been taking care of   HENT:        No recent cold symptoms    Occasional seasonal allergy symptoms intermittent  Not present at this time  Eyes: Negative for visual disturbance  Respiratory: Negative for cough, shortness of breath and wheezing  Occasional snoring at night  Improved with weight loss per patient   Cardiovascular: Negative for chest pain and palpitations  Gastrointestinal: Negative for abdominal distention, abdominal pain, blood in stool, constipation, diarrhea, nausea and vomiting  No abdominal pain distention or fullness  Bowel movements are daily 1st thing in the morning  Very regular  No abdominal symptoms   Genitourinary: Negative for dysuria  Neurological: Negative for dizziness, syncope, weakness, light-headedness, numbness and headaches  Psychiatric/Behavioral: Positive for depression  Negative for agitation  Anxiety and depressive symptoms well controlled this time considering current pandemic    Patient notes that he is coping with life stressors well  Work is very busy but he is doing okay with it           Patient Active Problem List   Diagnosis    Depression    Dyslipidemia    Impaired fasting glucose    BMI 34 0-34 9,adult    Snoring    Decreased hearing of both ears    Abnormal liver ultrasound    Screening for malignant neoplasm of colon    Needs flu shot    Blood pressure elevated without history of HTN    Skin lesion of face       Past Medical History:   Diagnosis Date    Acute suppurative otitis media of left ear without spontaneous rupture of tympanic membrane 1/22/2018    Depression     HL (hearing loss)     Hypertension     Obesity (BMI 35 0-39 9 without comorbidity) 1/16/2017       Past Surgical History:   Procedure Laterality Date    WISDOM TOOTH EXTRACTION           Current Outpatient Medications:     aspirin 81 MG tablet, Take 1 tablet by mouth daily, Disp: , Rfl:     atorvastatin (LIPITOR) 20 mg tablet, Take 1 tablet (20 mg total) by mouth daily, Disp: 90 tablet, Rfl: 0    buPROPion (WELLBUTRIN XL) 300 mg 24 hr tablet, Take 1 tablet (300 mg total) by mouth daily, Disp: 90 tablet, Rfl: 0    co-enzyme Q-10 30 MG capsule, Take 30 mg by mouth daily, Disp: , Rfl:     Green Tea 150 MG CAPS, Take by mouth daily, Disp: , Rfl:     Multiple Vitamins-Minerals (MULTIVITAMIN ADULT) TABS, Take 1 tablet by mouth daily, Disp: , Rfl:     Omega-3 Fatty Acids ([employer] FISH OIL) 1000 MG CAPS, Take 2 capsules by mouth 2 (two) times a day, Disp: , Rfl:     Turmeric Curcumin 500 MG CAPS, Take 1 capsule by mouth daily, Disp: , Rfl:     venlafaxine (EFFEXOR-XR) 150 mg 24 hr capsule, Take 1 capsule (150 mg total) by mouth daily, Disp: 90 capsule, Rfl: 1    Magnesium 100 MG TABS, Take 1 tablet by mouth daily, Disp: , Rfl:     No Known Allergies    Social History     Socioeconomic History    Marital status:      Spouse name: None    Number of children: None    Years of education: None    Highest education level: None   Occupational History    None   Social Needs    Financial resource strain: None    Food insecurity:     Worry: None     Inability: None    Transportation needs:     Medical: None     Non-medical: None   Tobacco Use    Smoking status: Former Smoker     Packs/day: 0 50     Years: 25 00     Pack years: 12 50     Types: Cigarettes     Last attempt to quit: 2015     Years since quittin 6    Smokeless tobacco: Never Used   Substance and Sexual Activity    Alcohol use: Yes     Frequency: Monthly or less     Comment: Social    Drug use: No    Sexual activity: Yes   Lifestyle    Physical activity:     Days per week: None     Minutes per session: None    Stress: None   Relationships    Social connections:     Talks on phone: None     Gets together: None     Attends [employer] service: None     Active member of club or organization: None     Attends meetings of clubs or organizations: None     Relationship status: None    Intimate partner violence:     Fear of current or ex partner: None     Emotionally abused: None     Physically abused: None     Forced sexual activity: None   Other Topics Concern    None   Social History Narrative    None       Family History   Problem Relation Age of Onset    Diabetes Father         borderline    Hypothyroidism Father     Prostate cancer Father     Heart disease Father     Skin cancer Father     Hypothyroidism Sister     Fibromyalgia Sister     Heart disease Sister     Ovarian cancer Mother     Skin cancer Mother        PHQ-9 Depression Screening    PHQ-9:    Frequency of the following problems over the past two weeks:       Little interest or pleasure in doing things:  0 - not at all  Feeling down, depressed, or hopeless:  0 - not at all  Trouble falling or staying asleep, or sleeping too much:  0 - not at all  Feeling tired or having little energy:  1 - several days  Poor appetite or overeatin - not at all  Feeling bad about yourself - or that you are a failure or have let yourself or your family down:  0 - not at all  Trouble concentrating on things, such as reading the newspaper or watching television:  0 - not at all  Moving or speaking so slowly that other people could have noticed   Or the opposite - being so fidgety or restless that you have been moving around a lot more than usual:  0 - not at all  Thoughts that you would be better off dead, or of hurting yourself in some way:  0 - not at all  PHQ-2 Score:  0  PHQ-9 Score:  1         Health Maintenance   Topic Date Due    CRC Screening: Colonoscopy  1965    DTaP,Tdap,and Td Vaccines (1 - Tdap) 04/14/1978    HIV Screening  04/14/1982    Annual Physical  04/14/1985    BMI: Followup Plan  04/19/2020    Influenza Vaccine  07/01/2020    BMI: Adult  07/31/2021    CRC Screening: Cologuard  03/20/2023    Pneumococcal Vaccine: 65+ Years (1 of 2 - PCV13) 04/14/2032    Pneumococcal Vaccine: Pediatrics (0 to 5 Years) and At-Risk Patients (6 to 59 Years)  Aged Out    HIB Vaccine  Aged Out    Hepatitis B Vaccine  Aged Out    IPV Vaccine  Aged Out    Hepatitis A Vaccine  Aged Out    Meningococcal ACWY Vaccine  Aged Out    HPV Vaccine  Aged [employer] History   Administered Date(s) Administered    H1N1, All Formulations 01/14/2010    INFLUENZA 12/07/2015, 11/28/2016    Influenza Quadrivalent Preservative Free 3 years and older IM 12/07/2015    Influenza Quadrivalent, 6-35 Months IM 11/28/2016    Influenza, recombinant, quadrivalent,injectable, preservative free 12/21/2018, 10/11/2019        Objective:  Vitals:    07/31/20 0647 07/31/20 0702   BP: 144/88 134/84   BP Location: Left arm    Patient Position: Sitting    Cuff Size: Large    Pulse: 74    Temp: (!) 97 1 °F (36 2 °C)    TempSrc: Temporal    SpO2: 97%    Weight: 114 kg (252 lb 3 2 oz)    Height: 5' 11 75" (1 822 m)      Wt Readings from Last 3 Encounters:   07/31/20 114 kg (252 lb 3 2 oz) 11/15/19 118 kg (259 lb 6 4 oz)   10/11/19 116 kg (254 lb 12 8 oz)     Body mass index is 34 44 kg/m²  No exam data present       Physical Exam   Constitutional: He is oriented to person, place, and time  He appears well-developed and well-nourished  No distress  Alert pleasant cooperative  Seated in room in no acute distress   HENT:   Head: Normocephalic and atraumatic  Mouth/Throat: Oropharynx is clear and moist  No oropharyngeal exudate  Moist mucous membranes normal posterior pharynx  Uvula midline   Eyes: Pupils are equal, round, and reactive to light  Right eye exhibits no discharge  Left eye exhibits no discharge  No scleral icterus  Neck: Neck supple  Cardiovascular: Normal rate, regular rhythm and normal heart sounds  No murmur heard  Regular rate and rhythm   Pulmonary/Chest: Effort normal and breath sounds normal  No respiratory distress  He has no wheezes  He has no rales  Clear to auscultation   Abdominal: Soft  Bowel sounds are normal  He exhibits no distension, no pulsatile liver, no ascites and no mass  There is no tenderness  There is no rigidity, no rebound, no guarding and negative Stearns's sign  Soft nontender nondistended positive bowel sounds   Musculoskeletal: He exhibits no edema  Neurological: He is alert and oriented to person, place, and time  Skin: Skin is warm and dry  He is not diaphoretic  Psychiatric: He has a normal mood and affect  His behavior is normal  Thought content normal  His mood appears not anxious  His affect is not angry  He is not agitated and not aggressive  He does not exhibit a depressed mood  He expresses no homicidal and no suicidal ideation  He expresses no suicidal plans and no homicidal plans  Nursing note and vitals reviewed            Future Appointments   Date Time Provider Jaqueline Taylor   12/4/2020  7:00 AM Richelle Multani PA-C [id number] PRIMARY [facility]ARE [facility]    Patient Care Team:  Dixie Flores PA-C as PCP - General (Internal Medicine)    This note was completed in part utilizing M-Modal Fluency Direct Software  Grammatical errors, random word insertions, spelling mistakes, and incomplete sentences can be an occasional consequence of this system secondary to software limitations, ambient noise, and hardware issues  If you have any questions or concerns about the content, text, or information contained within the body of this dictation, please contact the provider for clarification

## 2020-07-31 NOTE — PATIENT INSTRUCTIONS
Impaired fasting glucose  Reviewed labs  Patient's A1c 5 9  Recommended low carb diet  Gave encouragement and commended patient on weight loss  Discuss low carb lower lobe sugar options and the importance of daily exercise  Snoring  Patient has order for sleep study and plans to get this scheduled in the upcoming months  Discussed how weight loss can also help with obstructive sleep symptoms  Discussed importance of being evaluated for possible sleep apnea as it can affect weight blood pressure cholesterol etc  Dyslipidemia  Reviewed lipid panel  Continue Lipitor  LFT stable  Continue fish oil  Commended on weight loss  Continue low-fat low-cholesterol diet  Depression  Stable at this time on Wellbutrin and Effexor  No medication changes    Blood pressure elevated without history of HTN  Discussed importance of checking blood pressure at home  Discussed blood pressure goal of less than 130 over less than 80  Discussed low-salt diet and daily exercise which can also improved blood pressure  Discussed weight loss which can improve blood pressure  Go for sleep study  If blood pressure is not at goal report back sooner than four-month follow-up  Check blood pressure at home and keep log  Abnormal liver ultrasound  Previous MRI performed 03/2019 recommending six-month follow-up  Patient has not yet gotten to schedule his follow-up MRI due to current pandemic but plans to reschedule  Place new order for MRI of abdomen today  LFT normal   Discussed importance of going for MRI to further evaluate for any change  Patient verbalized understanding is willing

## 2020-08-13 ENCOUNTER — TELEPHONE (OUTPATIENT)
Dept: INTERNAL MEDICINE CLINIC | Facility: CLINIC | Age: 55
End: 2020-08-13

## 2020-08-13 NOTE — TELEPHONE ENCOUNTER
Please add R73 03 - pre diabetes to the problem list    E66 9 - obesity did not richelle the A1C done 7/24/2020

## 2020-08-14 PROBLEM — R73.03 PREDIABETES: Status: ACTIVE | Noted: 2020-08-14

## 2020-09-15 LAB
ALBUMIN SERPL-MCNC: 4.6 G/DL (ref 3.6–5.1)
ALBUMIN/GLOB SERPL: 1.9 (CALC) (ref 1–2.5)
ALP SERPL-CCNC: 48 U/L (ref 35–144)
ALT SERPL-CCNC: 24 U/L (ref 9–46)
AST SERPL-CCNC: 19 U/L (ref 10–35)
BASOPHILS # BLD AUTO: 41 CELLS/UL (ref 0–200)
BASOPHILS NFR BLD AUTO: 0.6 %
BILIRUB SERPL-MCNC: 0.9 MG/DL (ref 0.2–1.2)
BUN SERPL-MCNC: 13 MG/DL (ref 7–25)
BUN/CREAT SERPL: ABNORMAL (CALC) (ref 6–22)
CALCIUM SERPL-MCNC: 9.9 MG/DL (ref 8.6–10.3)
CHLORIDE SERPL-SCNC: 103 MMOL/L (ref 98–110)
CHOLEST SERPL-MCNC: 179 MG/DL
CHOLEST/HDLC SERPL: 2.9 (CALC)
CO2 SERPL-SCNC: 29 MMOL/L (ref 20–32)
CREAT SERPL-MCNC: 1.07 MG/DL (ref 0.7–1.33)
EOSINOPHIL # BLD AUTO: 163 CELLS/UL (ref 15–500)
EOSINOPHIL NFR BLD AUTO: 2.4 %
ERYTHROCYTE [DISTWIDTH] IN BLOOD BY AUTOMATED COUNT: 13.5 % (ref 11–15)
GLOBULIN SER CALC-MCNC: 2.4 G/DL (CALC) (ref 1.9–3.7)
GLUCOSE SERPL-MCNC: 121 MG/DL (ref 65–99)
HCT VFR BLD AUTO: 45 % (ref 38.5–50)
HDLC SERPL-MCNC: 61 MG/DL
HGB BLD-MCNC: 14.9 G/DL (ref 13.2–17.1)
LDLC SERPL CALC-MCNC: 100 MG/DL (CALC)
LYMPHOCYTES # BLD AUTO: 1741 CELLS/UL (ref 850–3900)
LYMPHOCYTES NFR BLD AUTO: 25.6 %
MCH RBC QN AUTO: 31 PG (ref 27–33)
MCHC RBC AUTO-ENTMCNC: 33.1 G/DL (ref 32–36)
MCV RBC AUTO: 93.8 FL (ref 80–100)
MONOCYTES # BLD AUTO: 571 CELLS/UL (ref 200–950)
MONOCYTES NFR BLD AUTO: 8.4 %
NEUTROPHILS # BLD AUTO: 4284 CELLS/UL (ref 1500–7800)
NEUTROPHILS NFR BLD AUTO: 63 %
NONHDLC SERPL-MCNC: 118 MG/DL (CALC)
PLATELET # BLD AUTO: 217 THOUSAND/UL (ref 140–400)
PMV BLD REES-ECKER: 10.8 FL (ref 7.5–12.5)
POTASSIUM SERPL-SCNC: 4.9 MMOL/L (ref 3.5–5.3)
PROT SERPL-MCNC: 7 G/DL (ref 6.1–8.1)
RBC # BLD AUTO: 4.8 MILLION/UL (ref 4.2–5.8)
SL AMB EGFR AFRICAN AMERICAN: 91 ML/MIN/1.73M2
SL AMB EGFR NON AFRICAN AMERICAN: 79 ML/MIN/1.73M2
SODIUM SERPL-SCNC: 139 MMOL/L (ref 135–146)
TRIGL SERPL-MCNC: 89 MG/DL
WBC # BLD AUTO: 6.8 THOUSAND/UL (ref 3.8–10.8)

## 2020-09-18 NOTE — RESULT ENCOUNTER NOTE
Spoke with patient  He notes that he was told to go for his labs so that he could proceed with the MRI (due to it using contrast)  Reviewed labs however little change since last visit  Give encouragement with low-fat low-cholesterol diet due to increased LDL    Patient will go for MRI as scheduled and follow-up in our office

## 2020-09-20 ENCOUNTER — APPOINTMENT (EMERGENCY)
Dept: RADIOLOGY | Facility: HOSPITAL | Age: 55
End: 2020-09-20
Payer: COMMERCIAL

## 2020-09-20 ENCOUNTER — HOSPITAL ENCOUNTER (EMERGENCY)
Facility: HOSPITAL | Age: 55
Discharge: HOME | End: 2020-09-20
Attending: EMERGENCY MEDICINE | Admitting: EMERGENCY MEDICINE
Payer: COMMERCIAL

## 2020-09-20 VITALS
BODY MASS INDEX: 35.51 KG/M2 | OXYGEN SATURATION: 93 % | TEMPERATURE: 99.5 F | DIASTOLIC BLOOD PRESSURE: 72 MMHG | WEIGHT: 260 LBS | RESPIRATION RATE: 18 BRPM | HEART RATE: 78 BPM | SYSTOLIC BLOOD PRESSURE: 138 MMHG

## 2020-09-20 DIAGNOSIS — M79.601 RIGHT ARM PAIN: ICD-10-CM

## 2020-09-20 DIAGNOSIS — M79.602 LEFT ARM PAIN: Primary | ICD-10-CM

## 2020-09-20 LAB
ANION GAP SERPL CALCULATED.3IONS-SCNC: 4 MMOL/L (ref 4–13)
BASOPHILS # BLD AUTO: 0.03 THOUSANDS/ΜL (ref 0–0.1)
BASOPHILS NFR BLD AUTO: 0 % (ref 0–1)
BUN SERPL-MCNC: 15 MG/DL (ref 5–25)
CALCIUM SERPL-MCNC: 8.9 MG/DL (ref 8.3–10.1)
CHLORIDE SERPL-SCNC: 108 MMOL/L (ref 100–108)
CO2 SERPL-SCNC: 30 MMOL/L (ref 21–32)
CREAT SERPL-MCNC: 1.1 MG/DL (ref 0.6–1.3)
EOSINOPHIL # BLD AUTO: 0.15 THOUSAND/ΜL (ref 0–0.61)
EOSINOPHIL NFR BLD AUTO: 2 % (ref 0–6)
ERYTHROCYTE [DISTWIDTH] IN BLOOD BY AUTOMATED COUNT: 13.5 % (ref 11.6–15.1)
GFR SERPL CREATININE-BSD FRML MDRD: 76 ML/MIN/1.73SQ M
GLUCOSE SERPL-MCNC: 102 MG/DL (ref 65–140)
HCT VFR BLD AUTO: 45.3 % (ref 36.5–49.3)
HGB BLD-MCNC: 14.8 G/DL (ref 12–17)
IMM GRANULOCYTES # BLD AUTO: 0.02 THOUSAND/UL (ref 0–0.2)
IMM GRANULOCYTES NFR BLD AUTO: 0 % (ref 0–2)
LYMPHOCYTES # BLD AUTO: 1.85 THOUSANDS/ΜL (ref 0.6–4.47)
LYMPHOCYTES NFR BLD AUTO: 27 % (ref 14–44)
MCH RBC QN AUTO: 31.2 PG (ref 26.8–34.3)
MCHC RBC AUTO-ENTMCNC: 32.7 G/DL (ref 31.4–37.4)
MCV RBC AUTO: 96 FL (ref 82–98)
MONOCYTES # BLD AUTO: 0.58 THOUSAND/ΜL (ref 0.17–1.22)
MONOCYTES NFR BLD AUTO: 9 % (ref 4–12)
NEUTROPHILS # BLD AUTO: 4.11 THOUSANDS/ΜL (ref 1.85–7.62)
NEUTS SEG NFR BLD AUTO: 62 % (ref 43–75)
NRBC BLD AUTO-RTO: 0 /100 WBCS
PLATELET # BLD AUTO: 228 THOUSANDS/UL (ref 149–390)
PMV BLD AUTO: 11.8 FL (ref 8.9–12.7)
POTASSIUM SERPL-SCNC: 4.1 MMOL/L (ref 3.5–5.3)
RBC # BLD AUTO: 4.74 MILLION/UL (ref 3.88–5.62)
SODIUM SERPL-SCNC: 142 MMOL/L (ref 136–145)
TROPONIN I SERPL-MCNC: <0.02 NG/ML
WBC # BLD AUTO: 6.74 THOUSAND/UL (ref 4.31–10.16)

## 2020-09-20 PROCEDURE — 99285 EMERGENCY DEPT VISIT HI MDM: CPT | Performed by: EMERGENCY MEDICINE

## 2020-09-20 PROCEDURE — 36415 COLL VENOUS BLD VENIPUNCTURE: CPT | Performed by: EMERGENCY MEDICINE

## 2020-09-20 PROCEDURE — 71045 X-RAY EXAM CHEST 1 VIEW: CPT

## 2020-09-20 PROCEDURE — 93005 ELECTROCARDIOGRAM TRACING: CPT

## 2020-09-20 PROCEDURE — 84484 ASSAY OF TROPONIN QUANT: CPT | Performed by: EMERGENCY MEDICINE

## 2020-09-20 PROCEDURE — 99284 EMERGENCY DEPT VISIT MOD MDM: CPT

## 2020-09-20 PROCEDURE — 85025 COMPLETE CBC W/AUTO DIFF WBC: CPT | Performed by: EMERGENCY MEDICINE

## 2020-09-20 PROCEDURE — 80048 BASIC METABOLIC PNL TOTAL CA: CPT | Performed by: EMERGENCY MEDICINE

## 2020-09-20 RX ORDER — SODIUM CHLORIDE 9 MG/ML
3 INJECTION INTRAVENOUS
Status: DISCONTINUED | OUTPATIENT
Start: 2020-09-20 | End: 2020-09-20 | Stop reason: HOSPADM

## 2020-09-20 NOTE — ED ATTENDING ATTESTATION
Final Diagnosis:  1  Left arm pain    2  Right arm pain      ED Course as of Sep 22 1633   Radha Sep 20, 2020   1304 This is a 51 y/o M  Bilateral intermittent shoulder pain for a month  Nothing is new today  He has been putting it off  He is here b/c his dad had a heart attack at a young age so came in today b/c he was free ot make sure it's not a heart attack  Brief twinge in chest earlier that reso  musa  No f/ch/n/v  No LH/dizziness/LH  No CP/SOB  No LE edema      1306 General: VSS, NAD, awake, alert  Well-nourished, well-developed  Appears stated age  Speaking normally in full sentences  Head: Normocephalic, atraumatic, nontender  Eyes: PERRL, EOM-I  No diplopia  No hyphema  No subconjunctival hemorrhages  Symmetrical lids  ENT: Atraumatic external nose and ears  MMM  No malocclusion  No stridor  Normal phonation  No drooling  Normal swallowing  Neck: Symmetric, trachea midline  No JVD  CV: RRR  +S1/S2  No murmurs or gallops  Peripheral pulses +2 throughout  No chest wall tenderness  Lungs:   Unlabored No retractions  CTAB, lungs sounds equal bilateral    No tachypnea  Abd: +BS, soft, NT/ND    MSK:   FROM   Back:   No rashes  Skin: Dry, intact  Neuro: AAOx3, GCS 15, CN II-XII grossly intact  Motor grossly intact  Psychiatric/Behavioral: Appropriate mood and affect   Exam: deferred      1306 A/P:  - ACS r/o x1 given RFs    - Trop x1 due to duration of symptoms  - DC            I, Selvin Stovall MD, saw and evaluated the patient  All available labs and X-rays were ordered by me or the resident and have been reviewed by myself  I discussed the patient with the resident / non-physician and agree with the resident's / non-physician practitioner's findings and plan as documented in the resident's / non-physician practicitioner's note, except where noted  At this point, I agree with the current assessment done in the ED     I was present during key portions of all procedures performed unless otherwise stated  Chief Complaint   Patient presents with    Arm Pain     Pt has c/o B/l arm pains that come and go for over a month  Pt denies any right now  Pt had 1 episode of jaw pain yesterday  Pt denies any chest pain or SOB  PMH:   has a past medical history of Acute suppurative otitis media of left ear without spontaneous rupture of tympanic membrane (2018), Depression, HL (hearing loss), Hyperlipidemia, Hypertension, and Obesity (BMI 35 0-39 9 without comorbidity) (2017)  PSH:   has a past surgical history that includes [place] tooth extraction  Social:  Social History     Substance and Sexual Activity   Alcohol Use Yes    Alcohol/week: 4 0 standard drinks    Types: 4 Cans of beer per week    Frequency: 4 or more times a week    Drinks per session: 3 or 4    Binge frequency: Daily or almost daily     Social History     Tobacco Use   Smoking Status Former Smoker    Packs/day: 0 50    Years: 25 00    Pack years: 12 50    Types: Cigarettes    Last attempt to quit: 2015    Years since quittin 8   Smokeless Tobacco Never Used     Social History     Substance and Sexual Activity   Drug Use No     PE:  Vitals:    20 1235 20 1348   BP: 148/86 138/72   BP Location: Right arm Right arm   Pulse: 95 78   Resp: 18 18   Temp: 99 5 °F (37 5 °C) 99 5 °F (37 5 °C)   TempSrc: Oral    SpO2: 98% 93%   Weight: 118 kg (260 lb)      - 13 point ROS was performed and all are normal unless stated in the history above  - Nursing note reviewed  Vitals reviewed  - Orders placed by myself and/or advanced practitioner / resident     - Previous chart was reviewed  - No language barrier    - History obtained from patient  - There are no limitations to the history obtained  - Critical care time: Not applicable for this patient       Code Status: No Order  Advance Directive and Living Will:      Power of :    POLST:      Medications - No data to display  X-ray chest 1 view portable   ED Interpretation   No pna      Final Result      No acute cardiopulmonary disease  Workstation performed: [id number]           Orders Placed This Encounter   Procedures    X-ray chest 1 view portable    CBC and differential    Basic metabolic panel    Troponin I    Continuous cardiac monitoring    Continuous pulse oximetry    EKG RESULTS    ECG 12 lead     Labs Reviewed   TROPONIN I - Normal       Result Value Ref Range Status    Troponin I <0 02  <=0 04 ng/mL Final    Comment: Siemens Chemistry analyzer 99% cutoff is > 0 04 ng/mL in network labs     o cTnI 99% cutoff is useful only when applied to patients in the clinical setting of myocardial ischemia   o cTnI 99% cutoff should be interpreted in the context of clinical history, ECG findings and possibly cardiac imaging to establish correct diagnosis  o cTnI 99% cutoff may be suggestive but clearly not indicative of a coronary event without the clinical setting of myocardial ischemia       CBC AND DIFFERENTIAL    WBC 6 74  4 31 - 10 16 Thousand/uL Final    RBC 4 74  3 88 - 5 62 Million/uL Final    Hemoglobin 14 8  12 0 - 17 0 g/dL Final    Hematocrit 45 3  36 5 - 49 3 % Final    MCV 96  82 - 98 fL Final    MCH 31 2  26 8 - 34 3 pg Final    MCHC 32 7  31 4 - 37 4 g/dL Final    RDW 13 5  11 6 - 15 1 % Final    MPV 11 8  8 9 - 12 7 fL Final    Platelets 690  068 - 390 Thousands/uL Final    nRBC 0  /100 WBCs Final    Neutrophils Relative 62  43 - 75 % Final    Immat GRANS % 0  0 - 2 % Final    Lymphocytes Relative 27  14 - 44 % Final    Monocytes Relative 9  4 - 12 % Final    Eosinophils Relative 2  0 - 6 % Final    Basophils Relative 0  0 - 1 % Final    Neutrophils Absolute 4 11  1 85 - 7 62 Thousands/µL Final    Immature Grans Absolute 0 02  0 00 - 0 20 Thousand/uL Final    Lymphocytes Absolute 1 85  0 60 - 4 47 Thousands/µL Final    Monocytes Absolute 0 58  0 17 - 1 22 Thousand/µL Final    Eosinophils Absolute 0 15  0 00 - 0 61 Thousand/µL Final    Basophils Absolute 0 03  0 00 - 0 10 Thousands/µL Final   BASIC METABOLIC PANEL    Sodium 480  136 - 145 mmol/L Final    Potassium 4 1  3 5 - 5 3 mmol/L Final    Chloride 108  100 - 108 mmol/L Final    CO2 30  21 - 32 mmol/L Final    ANION GAP 4  4 - 13 mmol/L Final    BUN 15  5 - 25 mg/dL Final    Creatinine 1 10  0 60 - 1 30 mg/dL Final    Comment: Standardized to IDMS reference method    Glucose 102  65 - 140 mg/dL Final    Comment: If the patient is fasting, the ADA then defines impaired fasting glucose as > 100 mg/dL and diabetes as > or equal to 123 mg/dL  Specimen collection should occur prior to Sulfasalazine administration due to the potential for falsely depressed results  Specimen collection should occur prior to Sulfapyridine administration due to the potential for falsely elevated results  Calcium 8 9  8 3 - 10 1 mg/dL Final    eGFR 76  ml/min/1 73sq m Final    Narrative:     [place] guidelines for Chronic Kidney Disease (CKD):     Stage 1 with normal or high GFR (GFR > 90 mL/min/1 73 square meters)    Stage 2 Mild CKD (GFR = 60-89 mL/min/1 73 square meters)    Stage 3A Moderate CKD (GFR = 45-59 mL/min/1 73 square meters)    Stage 3B Moderate CKD (GFR = 30-44 mL/min/1 73 square meters)    Stage 4 Severe CKD (GFR = 15-29 mL/min/1 73 square meters)    Stage 5 End Stage CKD (GFR <15 mL/min/1 73 square meters)  Note: GFR calculation is accurate only with a steady state creatinine     Time reflects when diagnosis was documented in both MDM as applicable and the Disposition within this note     Time User Action Codes Description Comment    9/20/2020  2:32 PM Minor, Milla Add [M79 602] Left arm pain     9/20/2020  2:32 PM Minor, Milla Add [M79 601] Right arm pain       ED Disposition     ED Disposition Condition Date/Time Comment    Discharge Good Sun Sep 20, 2020  2:32 PM Ritchie Brutus discharge to home/self care  Follow-up Information     Follow up With Specialties Details Why Contact Info Additional Information    Yoana Hernandez PA-C Internal Medicine, Physician Assistant Call in 1 day  [address]        [address] Emergency Department Emergency Medicine Go to  If symptoms worsen [address]  [phone]  ED, [address] [place] [address], [place], [place], [address] [phone]        Discharge Medication List as of 9/20/2020  2:33 PM      CONTINUE these medications which have NOT CHANGED    Details   aspirin 81 MG tablet Take 1 tablet by mouth daily, Historical Med      atorvastatin (LIPITOR) 20 mg tablet Take 1 tablet (20 mg total) by mouth daily, Starting Wed 7/22/2020, Normal      buPROPion (WELLBUTRIN XL) 300 mg 24 hr tablet Take 1 tablet (300 mg total) by mouth daily, Starting Wed 7/22/2020, Normal      co-enzyme Q-10 30 MG capsule Take 30 mg by mouth daily, Historical Med      Green Tea 150 MG CAPS Take by mouth daily, Historical Med      Multiple Vitamins-Minerals (MULTIVITAMIN ADULT) TABS Take 1 tablet by mouth daily, Historical Med      Omega-3 Fatty Acids ([employer] FISH OIL) 1000 MG CAPS Take 2 capsules by mouth 2 (two) times a day, Historical Med      Turmeric Curcumin 500 MG CAPS Take 1 capsule by mouth daily, Historical Med      venlafaxine (EFFEXOR-XR) 150 mg 24 hr capsule Take 1 capsule (150 mg total) by mouth daily, Starting Wed 2/12/2020, Normal           No discharge procedures on file  Prior to Admission Medications   Prescriptions Last Dose Informant Patient Reported? Taking?    Green Tea 150 MG CAPS  Self Yes Yes   Sig: Take by mouth daily   Multiple Vitamins-Minerals (MULTIVITAMIN ADULT) TABS  Self Yes Yes   Sig: Take 1 tablet by mouth daily   Omega-3 Fatty Acids ([employer] FISH OIL) 1000 MG CAPS  Self Yes Yes   Sig: Take 2 capsules by mouth 2 (two) times a day   Turmeric Curcumin 500 MG CAPS  Self Yes Yes Sig: Take 1 capsule by mouth daily   aspirin 81 MG tablet  Self Yes Yes   Sig: Take 1 tablet by mouth daily   atorvastatin (LIPITOR) 20 mg tablet   No Yes   Sig: Take 1 tablet (20 mg total) by mouth daily   buPROPion (WELLBUTRIN XL) 300 mg 24 hr tablet   No Yes   Sig: Take 1 tablet (300 mg total) by mouth daily   co-enzyme Q-10 30 MG capsule  Self Yes Yes   Sig: Take 30 mg by mouth daily   venlafaxine (EFFEXOR-XR) 150 mg 24 hr capsule   No Yes   Sig: Take 1 capsule (150 mg total) by mouth daily      Facility-Administered Medications: None       Portions of the record may have been created with voice recognition software  Occasional wrong word or "sound a like" substitutions may have occurred due to the inherent limitations of voice recognition software  Read the chart carefully and recognize, using context, where substitutions have occurred      Electronically signed by:  Deysi Lindsey

## 2020-09-20 NOTE — ED PROVIDER NOTES
History  Chief Complaint   Patient presents with    Arm Pain     Pt has c/o B/l arm pains that come and go for over a month  Pt denies any right now  Pt had 1 episode of jaw pain yesterday  Pt denies any chest pain or SOB  82-year-old male with history of hyperlipidemia presents emergency department for bilateral arm pain  Patient says that he has had pain to bilateral shoulders intermittently over the past month  He says he gets 1-2 episodes per day lasting couple seconds each  Occasionally he also gets a brief pain in his right jaw or chest   He is symptom-free at this time and had been feeling well throughout the day  He says that he decided to come in because he was putting off getting his symptoms checked out and decided it was time to come in  He denies any acute changes in his symptoms  He has not had a fever, chills, diaphoresis, nausea, vomiting, and a focal neurological symptoms, any other symptoms or complaints  Prior to Admission Medications   Prescriptions Last Dose Informant Patient Reported? Taking?    Green Tea 150 MG CAPS  Self Yes Yes   Sig: Take by mouth daily   Multiple Vitamins-Minerals (MULTIVITAMIN ADULT) TABS  Self Yes Yes   Sig: Take 1 tablet by mouth daily   Omega-3 Fatty Acids ([employer]S FISH OIL) 1000 MG CAPS  Self Yes Yes   Sig: Take 2 capsules by mouth 2 (two) times a day   Turmeric Curcumin 500 MG CAPS  Self Yes Yes   Sig: Take 1 capsule by mouth daily   aspirin 81 MG tablet  Self Yes Yes   Sig: Take 1 tablet by mouth daily   atorvastatin (LIPITOR) 20 mg tablet   No Yes   Sig: Take 1 tablet (20 mg total) by mouth daily   buPROPion (WELLBUTRIN XL) 300 mg 24 hr tablet   No Yes   Sig: Take 1 tablet (300 mg total) by mouth daily   co-enzyme Q-10 30 MG capsule  Self Yes Yes   Sig: Take 30 mg by mouth daily   venlafaxine (EFFEXOR-XR) 150 mg 24 hr capsule   No Yes   Sig: Take 1 capsule (150 mg total) by mouth daily      Facility-Administered Medications: None       Past Medical History:   Diagnosis Date    Acute suppurative otitis media of left ear without spontaneous rupture of tympanic membrane 2018    Depression     HL (hearing loss)     Hyperlipidemia     Hypertension     Obesity (BMI 35 0-39 9 without comorbidity) 2017       Past Surgical History:   Procedure Laterality Date    WISDOM TOOTH EXTRACTION         Family History   Problem Relation Age of Onset    Diabetes Father         borderline    Hypothyroidism Father     Prostate cancer Father     Heart disease Father     Skin cancer Father     Hypothyroidism Sister     Fibromyalgia Sister     Heart disease Sister     Ovarian cancer Mother     Skin cancer Mother      I have reviewed and agree with the history as documented  E-Cigarette/Vaping     E-Cigarette/Vaping Substances     Social History     Tobacco Use    Smoking status: Former Smoker     Packs/day: 0 50     Years: 25 00     Pack years: 12 50     Types: Cigarettes     Last attempt to quit: 2015     Years since quittin 8    Smokeless tobacco: Never Used   Substance Use Topics    Alcohol use: Yes     Alcohol/week: 4 0 standard drinks     Types: 4 Cans of beer per week     Frequency: 4 or more times a week     Drinks per session: 3 or 4     Binge frequency: Daily or almost daily    Drug use: No        Review of Systems   Constitutional: Negative  Negative for chills and fever  HENT: Negative  Negative for congestion and rhinorrhea  Eyes: Negative  Respiratory: Negative  Negative for cough and shortness of breath  Cardiovascular: Negative  Negative for chest pain and leg swelling  Gastrointestinal: Negative  Negative for abdominal distention, abdominal pain, diarrhea, nausea and vomiting  Musculoskeletal: Negative for back pain and neck pain  Arm pain   Skin: Negative  Negative for rash  Neurological: Negative  Negative for light-headedness and headaches  Hematological: Negative      All other systems reviewed and are negative  Physical Exam  ED Triage Vitals [09/20/20 1235]   Temperature Pulse Respirations Blood Pressure SpO2   99 5 °F (37 5 °C) 95 18 148/86 98 %      Temp Source Heart Rate Source Patient Position - Orthostatic VS BP Location FiO2 (%)   Oral Monitor Sitting Right arm --      Pain Score       No Pain             Orthostatic Vital Signs  Vitals:    09/20/20 1235 09/20/20 1348   BP: 148/86 138/72   Pulse: 95 78   Patient Position - Orthostatic VS: Sitting Lying       Physical Exam  Vitals signs and nursing note reviewed  Constitutional:       General: He is not in acute distress  Appearance: He is well-developed  HENT:      Head: Normocephalic and atraumatic  Neck:      Musculoskeletal: Normal range of motion and neck supple  Cardiovascular:      Rate and Rhythm: Normal rate and regular rhythm  Pulses:           Radial pulses are 2+ on the right side and 2+ on the left side  Dorsalis pedis pulses are 2+ on the right side and 2+ on the left side  Heart sounds: Normal heart sounds  No murmur  No friction rub  No gallop  Pulmonary:      Effort: Pulmonary effort is normal  No respiratory distress  Breath sounds: Normal breath sounds  No wheezing or rales  Abdominal:      Palpations: Abdomen is soft  Tenderness: There is no abdominal tenderness  There is no guarding or rebound  Musculoskeletal:         General: No tenderness  Skin:     General: Skin is warm and dry  Capillary Refill: Capillary refill takes less than 2 seconds  Neurological:      Mental Status: He is alert and oriented to person, place, and time        Comments: Clear fluent speech         ED Medications  Medications - No data to display    Diagnostic Studies  Results Reviewed     Procedure Component Value Units Date/Time    Troponin I [[id number]]  (Normal) Collected:  09/20/20 1356    Lab Status:  Final result Specimen:  Blood from Arm, Left Updated:  09/20/20 1425     Troponin I <0 02 ng/mL     Basic metabolic panel [[id number]] Collected:  09/20/20 1356    Lab Status:  Final result Specimen:  Blood from Arm, Left Updated:  09/20/20 1415     Sodium 142 mmol/L      Potassium 4 1 mmol/L      Chloride 108 mmol/L      CO2 30 mmol/L      ANION GAP 4 mmol/L      BUN 15 mg/dL      Creatinine 1 10 mg/dL      Glucose 102 mg/dL      Calcium 8 9 mg/dL      eGFR 76 ml/min/1 73sq m     Narrative:       [place] guidelines for Chronic Kidney Disease (CKD):     Stage 1 with normal or high GFR (GFR > 90 mL/min/1 73 square meters)    Stage 2 Mild CKD (GFR = 60-89 mL/min/1 73 square meters)    Stage 3A Moderate CKD (GFR = 45-59 mL/min/1 73 square meters)    Stage 3B Moderate CKD (GFR = 30-44 mL/min/1 73 square meters)    Stage 4 Severe CKD (GFR = 15-29 mL/min/1 73 square meters)    Stage 5 End Stage CKD (GFR <15 mL/min/1 73 square meters)  Note: GFR calculation is accurate only with a steady state creatinine    CBC and differential [[id number]] Collected:  09/20/20 1356    Lab Status:  Final result Specimen:  Blood from Arm, Left Updated:  09/20/20 1404     WBC 6 74 Thousand/uL      RBC 4 74 Million/uL      Hemoglobin 14 8 g/dL      Hematocrit 45 3 %      MCV 96 fL      MCH 31 2 pg      MCHC 32 7 g/dL      RDW 13 5 %      MPV 11 8 fL      Platelets 849 Thousands/uL      nRBC 0 /100 WBCs      Neutrophils Relative 62 %      Immat GRANS % 0 %      Lymphocytes Relative 27 %      Monocytes Relative 9 %      Eosinophils Relative 2 %      Basophils Relative 0 %      Neutrophils Absolute 4 11 Thousands/µL      Immature Grans Absolute 0 02 Thousand/uL      Lymphocytes Absolute 1 85 Thousands/µL      Monocytes Absolute 0 58 Thousand/µL      Eosinophils Absolute 0 15 Thousand/µL      Basophils Absolute 0 03 Thousands/µL                  X-ray chest 1 view portable   ED Interpretation by Sierra Jackson MD (09/20 1424)   No pna      Final Result by Marylou Fong MD (09/20 1421) No acute cardiopulmonary disease  Workstation performed: [id number]               Procedures  Procedures      ED Course                           SBIRT 22yo+      Most Recent Value   SBIRT (22 yo +)   In order to provide better care to our patients, we are screening all of our patients for alcohol and drug use  Would it be okay to ask you these screening questions? Yes Filed at: 09/20/2020 1239   Initial Alcohol Screen: US AUDIT-C    1  How often do you have a drink containing alcohol?  0 Filed at: 09/20/2020 1239   2  How many drinks containing alcohol do you have on a typical day you are drinking? 3 Filed at: 09/20/2020 1239   3a  Male UNDER 65: How often do you have five or more drinks on one occasion? 5 Filed at: 09/20/2020 1239   Audit-C Score  (!) 8 Filed at: 09/20/2020 1239   Full Alcohol Screen: US AUDIT   4  How often during the last year have you found that you were not able to stop drinking once you had started? 0 Filed at: 09/20/2020 1239   5  How often during past year have you failed to do what was normally expected of you because of drinking? 0 Filed at: 09/20/2020 1239   6  How often in past year have you needed a first drink in the morning to get yourself going after a heavy drinking session? 0 Filed at: 09/20/2020 1239   7  How often in past year have you had feeling of guilt or remorse after drinking? 0 Filed at: 09/20/2020 1239   8  How often in past year have you been unable to remember what happened night before because you had been drinking? 0 Filed at: 09/20/2020 1239   9  Have you or someone else been injured as a result of your drinking? 0 Filed at: 09/20/2020 1239   10  Has a relative, friend, doctor or other health worker been concerned about your drinking and suggested you cut down?   0 Filed at: 09/20/2020 1239   AUDIT Total Score  8 Filed at: 09/20/2020 1239   EMMANUEL: How many times in the past year have you       Used an illegal drug or used a prescription medication for non-medical reasons? Never Filed at: 09/20/2020 1239                  [facility]  Number of Diagnoses or Management Options  Left arm pain:   Right arm pain:   Diagnosis management comments: 51-year-old male with history of hyperlipidemia presents emergency department for bilateral arm pain  Patient has been having intermittent pain over the past month, lasting a couple seconds per episode  He has been symptom-free today  Unclear etiology at this time  ACS unlikely given duration and characterization of symptoms, but will obtain cardiac panel to evaluate  Final assessment: Workup reassuring  Strict ED return precautions provided should symptoms worsen and patient can otherwise follow up outpatient  Patient expresses an understanding and agreement with the plan and remains in good condition for discharge  Disposition  Final diagnoses:   Left arm pain   Right arm pain     Time reflects when diagnosis was documented in both MDM as applicable and the Disposition within this note     Time User Action Codes Description Comment    9/20/2020  2:32 PM Milla Arredondo Add [M79 602] Left arm pain     9/20/2020  2:32 PM Milla Arredondo Add [M79 601] Right arm pain       ED Disposition     ED Disposition Condition Date/Time Comment    Discharge Good Sun Sep 20, 2020  2:32 PM Shayy Walls discharge to home/self care              Follow-up Information     Follow up With Specialties Details Why Contact Info Additional Information    Helga Phoenix PA-C Internal Medicine, Physician Assistant Call in 1 day  [address]        [address] [facility] Emergency Department Emergency Medicine Go to  If symptoms worsen [address]  [phone]  ED, [address] [place] [address], [address]   [phone]          Discharge Medication List as of 9/20/2020  2:33 PM      CONTINUE these medications which have NOT CHANGED Details   aspirin 81 MG tablet Take 1 tablet by mouth daily, Historical Med      atorvastatin (LIPITOR) 20 mg tablet Take 1 tablet (20 mg total) by mouth daily, Starting Wed 7/22/2020, Normal      buPROPion (WELLBUTRIN XL) 300 mg 24 hr tablet Take 1 tablet (300 mg total) by mouth daily, Starting Wed 7/22/2020, Normal      co-enzyme Q-10 30 MG capsule Take 30 mg by mouth daily, Historical Med      Green Tea 150 MG CAPS Take by mouth daily, Historical Med      Multiple Vitamins-Minerals (MULTIVITAMIN ADULT) TABS Take 1 tablet by mouth daily, Historical Med      Omega-3 Fatty Acids ([employer]S FISH OIL) 1000 MG CAPS Take 2 capsules by mouth 2 (two) times a day, Historical Med      Turmeric Curcumin 500 MG CAPS Take 1 capsule by mouth daily, Historical Med      venlafaxine (EFFEXOR-XR) 150 mg 24 hr capsule Take 1 capsule (150 mg total) by mouth daily, Starting Wed 2/12/2020, Normal           No discharge procedures on file  PDMP Review     None           ED Provider  Attending physically available and evaluated Shayy Walls I managed the patient along with the ED Attending      Electronically Signed by         Hanna Cole MD  09/21/20 9950

## 2020-09-21 LAB
ATRIAL RATE: 94 BPM
P AXIS: 49 DEGREES
PR INTERVAL: 148 MS
QRS AXIS: 18 DEGREES
QRSD INTERVAL: 82 MS
QT INTERVAL: 318 MS
QTC INTERVAL: 397 MS
T WAVE AXIS: 34 DEGREES
VENTRICULAR RATE: 94 BPM

## 2020-09-21 PROCEDURE — 93010 ELECTROCARDIOGRAM REPORT: CPT | Performed by: INTERNAL MEDICINE

## 2020-10-06 ENCOUNTER — HOSPITAL ENCOUNTER (OUTPATIENT)
Dept: MRI IMAGING | Facility: HOSPITAL | Age: 55
Discharge: HOME | End: 2020-10-06
Payer: COMMERCIAL

## 2020-10-06 DIAGNOSIS — R93.2 ABNORMAL LIVER ULTRASOUND: ICD-10-CM

## 2020-10-06 PROCEDURE — 74183 MRI ABD W/O CNTR FLWD CNTR: CPT

## 2020-10-06 PROCEDURE — A9585 GADOBUTROL INJECTION: HCPCS | Performed by: PHYSICIAN ASSISTANT

## 2020-10-06 RX ADMIN — GADOBUTROL 11 ML: 604.72 INJECTION INTRAVENOUS at 19:20

## 2020-10-25 DIAGNOSIS — F32.A DEPRESSION, UNSPECIFIED DEPRESSION TYPE: ICD-10-CM

## 2020-10-25 DIAGNOSIS — E78.5 DYSLIPIDEMIA: ICD-10-CM

## 2020-10-25 RX ORDER — VENLAFAXINE HYDROCHLORIDE 150 MG/1
CAPSULE, EXTENDED RELEASE ORAL
Qty: 90 CAPSULE | Refills: 3 | OUTPATIENT
Start: 2020-10-25

## 2020-10-27 DIAGNOSIS — F32.A DEPRESSION, UNSPECIFIED DEPRESSION TYPE: ICD-10-CM

## 2020-10-27 DIAGNOSIS — E78.5 DYSLIPIDEMIA: ICD-10-CM

## 2020-10-27 RX ORDER — ATORVASTATIN CALCIUM 20 MG/1
TABLET, FILM COATED ORAL
Qty: 90 TABLET | Refills: 3 | OUTPATIENT
Start: 2020-10-27

## 2020-10-27 RX ORDER — BUPROPION HYDROCHLORIDE 300 MG/1
TABLET ORAL
Qty: 90 TABLET | Refills: 3 | OUTPATIENT
Start: 2020-10-27

## 2020-10-27 RX ORDER — ATORVASTATIN CALCIUM 20 MG/1
20 TABLET, FILM COATED ORAL DAILY
Qty: 90 TABLET | Refills: 0 | Status: SHIPPED | OUTPATIENT
Start: 2020-10-27 | End: 2021-02-15 | Stop reason: SDUPTHER

## 2020-10-27 RX ORDER — VENLAFAXINE HYDROCHLORIDE 150 MG/1
150 CAPSULE, EXTENDED RELEASE ORAL DAILY
Qty: 90 CAPSULE | Refills: 0 | Status: SHIPPED | OUTPATIENT
Start: 2020-10-27 | End: 2021-02-15 | Stop reason: SDUPTHER

## 2020-10-28 DIAGNOSIS — F32.A DEPRESSION, UNSPECIFIED DEPRESSION TYPE: ICD-10-CM

## 2020-10-29 RX ORDER — BUPROPION HYDROCHLORIDE 300 MG/1
TABLET ORAL
Qty: 90 TABLET | Refills: 3 | OUTPATIENT
Start: 2020-10-29

## 2020-12-04 ENCOUNTER — OFFICE VISIT (OUTPATIENT)
Dept: INTERNAL MEDICINE CLINIC | Facility: CLINIC | Age: 55
End: 2020-12-04
Payer: COMMERCIAL

## 2020-12-04 VITALS
HEIGHT: 72 IN | OXYGEN SATURATION: 95 % | WEIGHT: 251 LBS | TEMPERATURE: 97.8 F | DIASTOLIC BLOOD PRESSURE: 78 MMHG | BODY MASS INDEX: 34 KG/M2 | HEART RATE: 92 BPM | SYSTOLIC BLOOD PRESSURE: 118 MMHG

## 2020-12-04 DIAGNOSIS — E78.5 DYSLIPIDEMIA: ICD-10-CM

## 2020-12-04 DIAGNOSIS — R06.83 SNORING: ICD-10-CM

## 2020-12-04 DIAGNOSIS — Z71.85 VACCINE COUNSELING: ICD-10-CM

## 2020-12-04 DIAGNOSIS — F32.A DEPRESSION, UNSPECIFIED DEPRESSION TYPE: ICD-10-CM

## 2020-12-04 DIAGNOSIS — L98.9 SKIN LESION OF FACE: ICD-10-CM

## 2020-12-04 DIAGNOSIS — Z23 NEED FOR VACCINATION FOR ZOSTER: ICD-10-CM

## 2020-12-04 DIAGNOSIS — Z82.49 FAMILY HISTORY OF EARLY CAD: ICD-10-CM

## 2020-12-04 DIAGNOSIS — Z23 NEEDS FLU SHOT: ICD-10-CM

## 2020-12-04 DIAGNOSIS — R07.9 CHEST PAIN, UNSPECIFIED TYPE: ICD-10-CM

## 2020-12-04 DIAGNOSIS — R73.03 PREDIABETES: Primary | ICD-10-CM

## 2020-12-04 DIAGNOSIS — R03.0 BLOOD PRESSURE ELEVATED WITHOUT HISTORY OF HTN: ICD-10-CM

## 2020-12-04 DIAGNOSIS — R93.2 ABNORMAL LIVER ULTRASOUND: ICD-10-CM

## 2020-12-04 PROCEDURE — 90472 IMMUNIZATION ADMIN EACH ADD: CPT

## 2020-12-04 PROCEDURE — 1036F TOBACCO NON-USER: CPT | Performed by: PHYSICIAN ASSISTANT

## 2020-12-04 PROCEDURE — 3008F BODY MASS INDEX DOCD: CPT | Performed by: PHYSICIAN ASSISTANT

## 2020-12-04 PROCEDURE — 90471 IMMUNIZATION ADMIN: CPT

## 2020-12-04 PROCEDURE — 90682 RIV4 VACC RECOMBINANT DNA IM: CPT

## 2020-12-04 PROCEDURE — 3725F SCREEN DEPRESSION PERFORMED: CPT | Performed by: PHYSICIAN ASSISTANT

## 2020-12-04 PROCEDURE — 90750 HZV VACC RECOMBINANT IM: CPT

## 2020-12-04 PROCEDURE — 99214 OFFICE O/P EST MOD 30 MIN: CPT | Performed by: PHYSICIAN ASSISTANT

## 2020-12-04 RX ORDER — BUPROPION HYDROCHLORIDE 300 MG/1
300 TABLET ORAL DAILY
Qty: 90 TABLET | Refills: 0 | Status: SHIPPED | OUTPATIENT
Start: 2020-12-04 | End: 2021-02-15 | Stop reason: SDUPTHER

## 2021-02-15 DIAGNOSIS — F32.A DEPRESSION, UNSPECIFIED DEPRESSION TYPE: ICD-10-CM

## 2021-02-15 DIAGNOSIS — E78.5 DYSLIPIDEMIA: ICD-10-CM

## 2021-02-15 RX ORDER — ATORVASTATIN CALCIUM 20 MG/1
20 TABLET, FILM COATED ORAL DAILY
Qty: 90 TABLET | Refills: 0 | Status: SHIPPED | OUTPATIENT
Start: 2021-02-15 | End: 2021-05-27 | Stop reason: SDUPTHER

## 2021-02-15 RX ORDER — BUPROPION HYDROCHLORIDE 300 MG/1
300 TABLET ORAL DAILY
Qty: 90 TABLET | Refills: 0 | Status: SHIPPED | OUTPATIENT
Start: 2021-02-15 | End: 2021-05-27 | Stop reason: SDUPTHER

## 2021-02-15 RX ORDER — VENLAFAXINE HYDROCHLORIDE 150 MG/1
150 CAPSULE, EXTENDED RELEASE ORAL DAILY
Qty: 90 CAPSULE | Refills: 0 | Status: SHIPPED | OUTPATIENT
Start: 2021-02-15 | End: 2021-05-27 | Stop reason: SDUPTHER

## 2021-03-26 DIAGNOSIS — F32.A DEPRESSION, UNSPECIFIED DEPRESSION TYPE: ICD-10-CM

## 2021-03-26 DIAGNOSIS — E78.5 DYSLIPIDEMIA: ICD-10-CM

## 2021-03-29 RX ORDER — VENLAFAXINE HYDROCHLORIDE 150 MG/1
CAPSULE, EXTENDED RELEASE ORAL
Qty: 90 CAPSULE | Refills: 3 | OUTPATIENT
Start: 2021-03-29

## 2021-03-29 RX ORDER — BUPROPION HYDROCHLORIDE 300 MG/1
TABLET ORAL
Qty: 90 TABLET | Refills: 3 | OUTPATIENT
Start: 2021-03-29

## 2021-03-29 RX ORDER — ATORVASTATIN CALCIUM 20 MG/1
TABLET, FILM COATED ORAL
Qty: 90 TABLET | Refills: 3 | OUTPATIENT
Start: 2021-03-29

## 2021-04-10 LAB
ALBUMIN SERPL-MCNC: 4.5 G/DL (ref 3.6–5.1)
ALBUMIN/GLOB SERPL: 2 (CALC) (ref 1–2.5)
ALP SERPL-CCNC: 46 U/L (ref 35–144)
ALT SERPL-CCNC: 26 U/L (ref 9–46)
AST SERPL-CCNC: 18 U/L (ref 10–35)
BASOPHILS # BLD AUTO: 41 CELLS/UL (ref 0–200)
BASOPHILS NFR BLD AUTO: 0.6 %
BILIRUB SERPL-MCNC: 0.9 MG/DL (ref 0.2–1.2)
BUN SERPL-MCNC: 17 MG/DL (ref 7–25)
BUN/CREAT SERPL: ABNORMAL (CALC) (ref 6–22)
CALCIUM SERPL-MCNC: 9.5 MG/DL (ref 8.6–10.3)
CHLORIDE SERPL-SCNC: 102 MMOL/L (ref 98–110)
CHOLEST SERPL-MCNC: 147 MG/DL
CHOLEST/HDLC SERPL: 2.6 (CALC)
CO2 SERPL-SCNC: 29 MMOL/L (ref 20–32)
CREAT SERPL-MCNC: 0.98 MG/DL (ref 0.7–1.33)
EOSINOPHIL # BLD AUTO: 184 CELLS/UL (ref 15–500)
EOSINOPHIL NFR BLD AUTO: 2.7 %
ERYTHROCYTE [DISTWIDTH] IN BLOOD BY AUTOMATED COUNT: 13.6 % (ref 11–15)
EST. AVERAGE GLUCOSE BLD GHB EST-MCNC: 126 (CALC)
EST. AVERAGE GLUCOSE BLD GHB EST-SCNC: 7 (CALC)
GLOBULIN SER CALC-MCNC: 2.3 G/DL (CALC) (ref 1.9–3.7)
GLUCOSE SERPL-MCNC: 127 MG/DL (ref 65–99)
HBA1C MFR BLD: 6 % OF TOTAL HGB
HCT VFR BLD AUTO: 44.9 % (ref 38.5–50)
HDLC SERPL-MCNC: 56 MG/DL
HGB BLD-MCNC: 14.7 G/DL (ref 13.2–17.1)
LDLC SERPL CALC-MCNC: 72 MG/DL (CALC)
LYMPHOCYTES # BLD AUTO: 1659 CELLS/UL (ref 850–3900)
LYMPHOCYTES NFR BLD AUTO: 24.4 %
MCH RBC QN AUTO: 30.9 PG (ref 27–33)
MCHC RBC AUTO-ENTMCNC: 32.7 G/DL (ref 32–36)
MCV RBC AUTO: 94.5 FL (ref 80–100)
MONOCYTES # BLD AUTO: 524 CELLS/UL (ref 200–950)
MONOCYTES NFR BLD AUTO: 7.7 %
NEUTROPHILS # BLD AUTO: 4393 CELLS/UL (ref 1500–7800)
NEUTROPHILS NFR BLD AUTO: 64.6 %
NONHDLC SERPL-MCNC: 91 MG/DL (CALC)
PLATELET # BLD AUTO: 236 THOUSAND/UL (ref 140–400)
PMV BLD REES-ECKER: 10.5 FL (ref 7.5–12.5)
POTASSIUM SERPL-SCNC: 4.8 MMOL/L (ref 3.5–5.3)
PROT SERPL-MCNC: 6.8 G/DL (ref 6.1–8.1)
RBC # BLD AUTO: 4.75 MILLION/UL (ref 4.2–5.8)
SL AMB EGFR AFRICAN AMERICAN: 102 ML/MIN/1.73M2
SL AMB EGFR NON AFRICAN AMERICAN: 88 ML/MIN/1.73M2
SODIUM SERPL-SCNC: 138 MMOL/L (ref 135–146)
TRIGL SERPL-MCNC: 104 MG/DL
TSH SERPL-ACNC: 2.07 MIU/L (ref 0.4–4.5)
WBC # BLD AUTO: 6.8 THOUSAND/UL (ref 3.8–10.8)

## 2021-04-11 DIAGNOSIS — E78.5 DYSLIPIDEMIA: ICD-10-CM

## 2021-04-11 DIAGNOSIS — F32.A DEPRESSION, UNSPECIFIED DEPRESSION TYPE: ICD-10-CM

## 2021-04-12 RX ORDER — BUPROPION HYDROCHLORIDE 300 MG/1
TABLET ORAL
Qty: 90 TABLET | Refills: 3 | OUTPATIENT
Start: 2021-04-12

## 2021-04-12 RX ORDER — ATORVASTATIN CALCIUM 20 MG/1
TABLET, FILM COATED ORAL
Qty: 90 TABLET | Refills: 3 | OUTPATIENT
Start: 2021-04-12

## 2021-04-12 RX ORDER — VENLAFAXINE HYDROCHLORIDE 150 MG/1
CAPSULE, EXTENDED RELEASE ORAL
Qty: 90 CAPSULE | Refills: 3 | OUTPATIENT
Start: 2021-04-12

## 2021-04-16 ENCOUNTER — OFFICE VISIT (OUTPATIENT)
Dept: INTERNAL MEDICINE CLINIC | Facility: CLINIC | Age: 56
End: 2021-04-16
Payer: COMMERCIAL

## 2021-04-16 VITALS
OXYGEN SATURATION: 98 % | DIASTOLIC BLOOD PRESSURE: 78 MMHG | HEIGHT: 72 IN | HEART RATE: 72 BPM | BODY MASS INDEX: 34.65 KG/M2 | TEMPERATURE: 98.2 F | RESPIRATION RATE: 18 BRPM | WEIGHT: 255.8 LBS | SYSTOLIC BLOOD PRESSURE: 124 MMHG

## 2021-04-16 DIAGNOSIS — R03.0 BLOOD PRESSURE ELEVATED WITHOUT HISTORY OF HTN: ICD-10-CM

## 2021-04-16 DIAGNOSIS — E78.5 DYSLIPIDEMIA: ICD-10-CM

## 2021-04-16 DIAGNOSIS — R06.83 SNORING: Primary | ICD-10-CM

## 2021-04-16 DIAGNOSIS — Z71.85 VACCINE COUNSELING: ICD-10-CM

## 2021-04-16 DIAGNOSIS — F32.A DEPRESSION, UNSPECIFIED DEPRESSION TYPE: ICD-10-CM

## 2021-04-16 DIAGNOSIS — L98.9 SKIN LESION OF FACE: ICD-10-CM

## 2021-04-16 DIAGNOSIS — E66.9 OBESITY (BMI 35.0-39.9 WITHOUT COMORBIDITY): ICD-10-CM

## 2021-04-16 DIAGNOSIS — R93.2 ABNORMAL LIVER ULTRASOUND: ICD-10-CM

## 2021-04-16 DIAGNOSIS — R73.03 PREDIABETES: ICD-10-CM

## 2021-04-16 DIAGNOSIS — R07.9 CHEST PAIN, UNSPECIFIED TYPE: ICD-10-CM

## 2021-04-16 PROCEDURE — 3725F SCREEN DEPRESSION PERFORMED: CPT | Performed by: PHYSICIAN ASSISTANT

## 2021-04-16 PROCEDURE — 99214 OFFICE O/P EST MOD 30 MIN: CPT | Performed by: PHYSICIAN ASSISTANT

## 2021-04-16 PROCEDURE — 1036F TOBACCO NON-USER: CPT | Performed by: PHYSICIAN ASSISTANT

## 2021-04-16 PROCEDURE — 3008F BODY MASS INDEX DOCD: CPT | Performed by: PHYSICIAN ASSISTANT

## 2021-04-16 NOTE — ASSESSMENT & PLAN NOTE
Lipid sugar good improvement since previous lipid panel  Continue Lipitor and fish oil  Encouraged continuance with low-fat low-cholesterol diet daily activity

## 2021-04-16 NOTE — ASSESSMENT & PLAN NOTE
Patient has not had any recurrence of symptoms last visit  No longer with chest or shoulder symptoms  Advised him to go for stress echo as previously ordered

## 2021-04-16 NOTE — ASSESSMENT & PLAN NOTE
Encouraged weight loss to help with nighttime snoring  Sleep on side that can help alleviate symptoms  We encourage patient to go for sleep study to further evaluate    Discussed importance of early diagnosis and treatment of obstructive sleep apnea

## 2021-04-16 NOTE — ASSESSMENT & PLAN NOTE
Symptoms improving with change in season  Continue Wellbutrin  No dose change  Will continue to follow    TSH recently checked and normal

## 2021-04-16 NOTE — ASSESSMENT & PLAN NOTE
Risks and benefits of vaccination  Patient considering getting COVID vaccination in the next few weeks  For the reason will hold on shingles vaccine 2  Discussed after he has had his COVID vaccination will wait about 6 weeks before getting his 2nd shingles shot  Patient verbalized understanding is willing  Discussed risks and benefits of vaccinations

## 2021-04-16 NOTE — ASSESSMENT & PLAN NOTE
Slight increase of A1c from 5 9to 6 0  Encouraged low-carbohydrate diet weight loss  Will continue to follow

## 2021-04-16 NOTE — PATIENT INSTRUCTIONS
Vaccine counseling  Risks and benefits of vaccination  Patient considering getting COVID vaccination in the next few weeks  For the reason will hold on shingles vaccine 2  Discussed after he has had his COVID vaccination will wait about 6 weeks before getting his 2nd shingles shot  Patient verbalized understanding is willing  Discussed risks and benefits of vaccinations  Snoring  Encouraged weight loss to help with nighttime snoring  Sleep on side that can help alleviate symptoms  We encourage patient to go for sleep study to further evaluate  Discussed importance of early diagnosis and treatment of obstructive sleep apnea    Prediabetes  Slight increase of A1c from 5 9to 6 0  Encouraged low-carbohydrate diet weight loss  Will continue to follow  Dyslipidemia  Lipid sugar good improvement since previous lipid panel  Continue Lipitor and fish oil  Encouraged continuance with low-fat low-cholesterol diet daily activity  Depression  Symptoms improving with change in season  Continue Wellbutrin  No dose change  Will continue to follow  TSH recently checked and normal    Blood pressure elevated without history of HTN  Blood pressure improved with reviewed blood pressure today in the office  This has been the case for multiple visits in the past   Encouraged patient to check his blood pressure at home and keep a blood pressure log  Discussed blood pressure goal is less than 120 over less than 80  Keep a blood pressure log with blood pressures being checked every other day to follow-up  Encouraged low-salt diet and daily activity

## 2021-04-16 NOTE — PROGRESS NOTES
[facility]  Standard Office Visit  Patient ID: Corinne Flow    : 1965  Age/Gender: 49 y o  male     DATE: 2021      Assessment/Plan:    Vaccine counseling  Risks and benefits of vaccination  Patient considering getting COVID vaccination in the next few weeks  For the reason will hold on shingles vaccine 2  Discussed after he has had his COVID vaccination will wait about 6 weeks before getting his 2nd shingles shot  Patient verbalized understanding is willing  Discussed risks and benefits of vaccinations  Snoring  Encouraged weight loss to help with nighttime snoring  Sleep on side that can help alleviate symptoms  We encourage patient to go for sleep study to further evaluate  Discussed importance of early diagnosis and treatment of obstructive sleep apnea    Prediabetes  Slight increase of A1c from 5 9to 6 0  Encouraged low-carbohydrate diet weight loss  Will continue to follow  Dyslipidemia  Lipid sugar good improvement since previous lipid panel  Continue Lipitor and fish oil  Encouraged continuance with low-fat low-cholesterol diet daily activity  Depression  Symptoms improving with change in season  Continue Wellbutrin  No dose change  Will continue to follow  TSH recently checked and normal    Blood pressure elevated without history of HTN  Blood pressure improved with reviewed blood pressure today in the office  This has been the case for multiple visits in the past   Encouraged patient to check his blood pressure at home and keep a blood pressure log  Discussed blood pressure goal is less than 120 over less than 80  Keep a blood pressure log with blood pressures being checked every other day to follow-up  Encouraged low-salt diet and daily activity  Skin lesion of face  Advised he schedule with Dermatology      Abnormal liver ultrasound  LFTs stable, MRI 10/2020 showing likely fatty infiltration  Will continue to follow  Obesity (BMI 35 0-39 9 without comorbidity)  Encouraged weight loss as above  Chest pain  Patient has not had any recurrence of symptoms last visit  No longer with chest or shoulder symptoms  Advised him to go for stress echo as previously ordered  Diagnoses and all orders for this visit:    Snoring    Prediabetes  -     CBC and differential; Future    Dyslipidemia  -     Lipid Panel with Direct LDL reflex; Future  -     Comprehensive metabolic panel; Future    Depression, unspecified depression type  -     CBC and differential; Future    Obesity (BMI 35 0-39 9 without comorbidity)    Abnormal liver ultrasound    Vaccine counseling    Blood pressure elevated without history of HTN  -     CBC and differential; Future    Skin lesion of face    Chest pain, unspecified type          BMI Counseling: Body mass index is 35 18 kg/m²  The BMI is above normal  Nutrition recommendations include decreasing portion sizes, encouraging healthy choices of fruits and vegetables, limiting drinks that contain sugar, increasing intake of lean protein and reducing intake of cholesterol  Exercise recommendations include exercising 3-5 times per week  No pharmacotherapy was ordered  Patient referred to PCP due to patient being overweight  Depression Screening and Follow-up Plan: Patient's depression screening was positive with a PHQ-2 score of 0  Their PHQ-9 score was 0  Patient advised to follow-up with PCP for further management  Subjective:   Chief Complaint   Patient presents with    Follow-up     4 month, bw done 4/9/21 n no other issues or concerns    health maintenance     annual exam, bmi f/u plan, bmi adult, phq9         Juma Skinner is a 49 y o  male who presents to the office on 4/16/2021 for     48 yo male with PMHx of snoring, depression, dyslipidemia, elevated BP and abnormal liver U/S presents to the office for routine follow up    No recurrence of L arm shoulder pain  No CP or palpitations  No exertional symptoms  No lightheadedness or dizziness with activity  No chest pain with activity  He notes he is not exercising but trying to improve on this  Notes he is getting an exercise bike  Has not been checking BP at home  Diet has been "pretty good "  He notes his diet has not been too bad  He slips up every so often  Tries for 3 meals per day  Has not schedueld with sleep study or with derm  Notes he needs to do this  Notes his mood is improving  Doing well  Work is going well  He currently does not have any active complaints or concerns at this time  No urinary symptoms      S 8 hours per night, good sleep  I hobbies is redecorating appartment  G denies  E ok, not bad  C notes he has some problems with distractions at time  A stable  P denies  S denies SI/HI          The following portions of the patient's history were reviewed and updated as appropriate: allergies, current medications, past family history, past medical history, past social history, past surgical history and problem list     Review of Systems   Constitutional: Negative for chills and fever  HENT: Negative for rhinorrhea and sore throat  Eyes: Negative for visual disturbance  Respiratory: Negative for cough, shortness of breath and wheezing  Cardiovascular: Negative for chest pain and palpitations  Gastrointestinal: Negative for abdominal pain, constipation, diarrhea, nausea and vomiting  Daily bowel movements  No blood in the stool  No dark tarry stool   Genitourinary: Negative for dysuria  Musculoskeletal: Negative for arthralgias and back pain  No recurrence of left shoulder pain that prompted his emergency room visit  He has not scheduled the echocardiogram but has the order to do so  Skin:        Skin lesion right cheek  Unchanged    Has not scheduled with Dermatology but plans to do so   Neurological: Negative for dizziness, syncope, weakness and light-headedness  Psychiatric/Behavioral: Negative for agitation          As noted in HPI    Patient has depressive symptoms improved         Patient Active Problem List   Diagnosis    Depression    Dyslipidemia    Obesity (BMI 35 0-39 9 without comorbidity)    Snoring    Decreased hearing of both ears    Abnormal liver ultrasound    Screening for malignant neoplasm of colon    Needs flu shot    Blood pressure elevated without history of HTN    Skin lesion of face    Prediabetes    Vaccine counseling       Past Medical History:   Diagnosis Date    Acute suppurative otitis media of left ear without spontaneous rupture of tympanic membrane 1/22/2018    Depression     HL (hearing loss)     Hyperlipidemia     Hypertension     Obesity (BMI 35 0-39 9 without comorbidity) 1/16/2017       Past Surgical History:   Procedure Laterality Date    WISDOM TOOTH EXTRACTION           Current Outpatient Medications:     aspirin 81 MG tablet, Take 1 tablet by mouth daily, Disp: , Rfl:     atorvastatin (LIPITOR) 20 mg tablet, Take 1 tablet (20 mg total) by mouth daily, Disp: 90 tablet, Rfl: 0    buPROPion (WELLBUTRIN XL) 300 mg 24 hr tablet, Take 1 tablet (300 mg total) by mouth daily, Disp: 90 tablet, Rfl: 0    co-enzyme Q-10 30 MG capsule, Take 30 mg by mouth daily, Disp: , Rfl:     Green Tea 150 MG CAPS, Take by mouth daily, Disp: , Rfl:     Multiple Vitamins-Minerals (MULTIVITAMIN ADULT) TABS, Take 1 tablet by mouth daily, Disp: , Rfl:     Omega-3 Fatty Acids ([employer] FISH OIL) 1000 MG CAPS, Take 2 capsules by mouth 2 (two) times a day, Disp: , Rfl:     Turmeric Curcumin 500 MG CAPS, Take 1 capsule by mouth daily, Disp: , Rfl:     venlafaxine (EFFEXOR-XR) 150 mg 24 hr capsule, Take 1 capsule (150 mg total) by mouth daily, Disp: 90 capsule, Rfl: 0    No Known Allergies    Social History     Socioeconomic History    Marital status:      Spouse name: None    Number of children: None    Years of education: None    Highest education level: None   Occupational History    None   Social Needs    Financial resource strain: None    Food insecurity     Worry: None     Inability: None    Transportation needs     Medical: None     Non-medical: None   Tobacco Use    Smoking status: Former Smoker     Packs/day: 0 50     Years: 25 00     Pack years: 12 50     Types: Cigarettes     Quit date: 2015     Years since quittin 3    Smokeless tobacco: Never Used   Substance and Sexual Activity    Alcohol use:  Yes     Alcohol/week: 4 0 standard drinks     Types: 4 Cans of beer per week     Frequency: 4 or more times a week     Drinks per session: 3 or 4     Binge frequency: Daily or almost daily    Drug use: No    Sexual activity: Yes   Lifestyle    Physical activity     Days per week: None     Minutes per session: None    Stress: None   Relationships    Social connections     Talks on phone: None     Gets together: None     Attends Oriental orthodox service: None     Active member of club or organization: None     Attends meetings of clubs or organizations: None     Relationship status: None    Intimate partner violence     Fear of current or ex partner: None     Emotionally abused: None     Physically abused: None     Forced sexual activity: None   Other Topics Concern    None   Social History Narrative    None       Family History   Problem Relation Age of Onset    Diabetes Father         borderline    Hypothyroidism Father     Prostate cancer Father     Heart disease Father     Skin cancer Father     Hypothyroidism Sister     Fibromyalgia Sister     Heart disease Sister     Ovarian cancer Mother     Skin cancer Mother        PHQ-9 Depression Screening    PHQ-9:   Frequency of the following problems over the past two weeks:      Little interest or pleasure in doing things: 0 - not at all  Feeling down, depressed, or hopeless: 0 - not at all  Trouble falling or staying asleep, or sleeping too much: 0 - not at all  Feeling tired or having little energy: 0 - not at all  Poor appetite or overeatin - not at all  Feeling bad about yourself - or that you are a failure or have let yourself or your family down: 0 - not at all  Trouble concentrating on things, such as reading the newspaper or watching television: 0 - not at all  Moving or speaking so slowly that other people could have noticed   Or the opposite - being so fidgety or restless that you have been moving around a lot more than usual: 0 - not at all  Thoughts that you would be better off dead, or of hurting yourself in some way: 0 - not at all  PHQ-2 Score: 0  PHQ-9 Score: 0         Health Maintenance   Topic Date Due    HIV Screening  Never done    COVID-19 Vaccine (1) Never done    Annual Physical  Never done    DTaP,Tdap,and Td Vaccines (1 - Tdap) Never done    BMI: Followup Plan  2021    Depression Remission PHQ  2021    BMI: Adult  2022    Colorectal Cancer Screening  2023    Influenza Vaccine  Completed    Pneumococcal Vaccine: Pediatrics (0 to 5 Years) and At-Risk Patients (6 to 59 Years)  Aged Out    HIB Vaccine  Aged Out    Hepatitis B Vaccine  Aged Out    IPV Vaccine  Aged Out    Hepatitis A Vaccine  Aged Out    Meningococcal ACWY Vaccine  Aged Out    HPV Vaccine  Aged [employer] History   Administered Date(s) Administered    H1N1, All Formulations 2010    INFLUENZA 2015, 2016    Influenza Quadrivalent Preservative Free 3 years and older IM 2015    Influenza Quadrivalent, 6-35 Months IM 2016    Influenza, recombinant, quadrivalent,injectable, preservative free 2018, 10/11/2019, 2020    Zoster Vaccine Recombinant 2020        Objective:  Vitals:    21 0650 21 0726   BP: 130/82 124/78   BP Location: Left arm    Patient Position: Sitting    Cuff Size: Large    Pulse: 72    Resp: 18    Temp: 98 2 °F (36 8 °C)    TempSrc: Tympanic    SpO2: 98%    Weight: 116 kg (255 lb 12 8 oz)    Height: 5' 11 5" (1 816 m)      Wt Readings from Last 3 Encounters:   04/16/21 116 kg (255 lb 12 8 oz)   12/04/20 114 kg (251 lb)   09/20/20 118 kg (260 lb)     Body mass index is 35 18 kg/m²  No exam data present       Physical Exam  Vitals signs and nursing note reviewed  Constitutional:       General: He is not in acute distress  Appearance: He is well-developed  He is not diaphoretic  Comments: Alert pleasant cooperative  Sitting in room in no acute distress   HENT:      Head: Normocephalic and atraumatic  Right Ear: Tympanic membrane normal       Left Ear: Tympanic membrane normal       Mouth/Throat:      Mouth: Mucous membranes are moist       Pharynx: No oropharyngeal exudate or posterior oropharyngeal erythema  Comments: Airway patent  Mallampati 1  Eyes:      General: No scleral icterus  Right eye: No discharge  Left eye: No discharge  Pupils: Pupils are equal, round, and reactive to light  Neck:      Musculoskeletal: Neck supple  Cardiovascular:      Rate and Rhythm: Normal rate and regular rhythm  Heart sounds: Normal heart sounds  No murmur  Comments: Regular rate and rhythm  No audible murmur  Pulmonary:      Effort: Pulmonary effort is normal  No respiratory distress  Breath sounds: Normal breath sounds  No wheezing or rales  Comments: Clear to auscultation throughout  No crackles no rhonchi or wheeze  No respiratory distress  Abdominal:      General: Bowel sounds are normal  There is no distension  Palpations: Abdomen is soft  Tenderness: There is no abdominal tenderness  There is no guarding  Comments: Positive bowel sounds soft nontender nondistended   Musculoskeletal:      Comments: Left shoulder active range of motions  No signs of impingement  No weakness  No pain with left shoulder range of motion    5/5 strength   Skin:     General: Skin is warm and dry       Findings: No rash  Neurological:      Mental Status: He is alert and oriented to person, place, and time  Psychiatric:         Mood and Affect: Mood normal  Mood is not anxious or depressed  Behavior: Behavior normal          Thought Content: Thought content normal  Thought content does not include homicidal or suicidal ideation  Future Appointments   Date Time Provider Jaqueline Claudia   10/15/2021  7:00 AM Wilber Smith PA-C [address] [place] [address]    Patient Care Team:  Wilber Smith PA-C as PCP - General (Internal Medicine)    This note was completed in part utilizing M-Modal Fluency Direct Software  Grammatical errors, random word insertions, spelling mistakes, and incomplete sentences can be an occasional consequence of this system secondary to software limitations, ambient noise, and hardware issues  If you have any questions or concerns about the content, text, or information contained within the body of this dictation, please contact the provider for clarification

## 2021-04-16 NOTE — ASSESSMENT & PLAN NOTE
Blood pressure improved with reviewed blood pressure today in the office  This has been the case for multiple visits in the past   Encouraged patient to check his blood pressure at home and keep a blood pressure log  Discussed blood pressure goal is less than 120 over less than 80  Keep a blood pressure log with blood pressures being checked every other day to follow-up  Encouraged low-salt diet and daily activity

## 2021-04-19 DIAGNOSIS — Z23 ENCOUNTER FOR IMMUNIZATION: ICD-10-CM

## 2021-04-21 ENCOUNTER — IMMUNIZATIONS (OUTPATIENT)
Dept: FAMILY MEDICINE CLINIC | Facility: HOSPITAL | Age: 56
End: 2021-04-21

## 2021-04-21 DIAGNOSIS — Z23 ENCOUNTER FOR IMMUNIZATION: Primary | ICD-10-CM

## 2021-04-21 PROCEDURE — 0011A SARS-COV-2 / COVID-19 MRNA VACCINE (MODERNA) 100 MCG: CPT

## 2021-04-21 PROCEDURE — 91301 SARS-COV-2 / COVID-19 MRNA VACCINE (MODERNA) 100 MCG: CPT

## 2021-05-24 ENCOUNTER — IMMUNIZATIONS (OUTPATIENT)
Dept: FAMILY MEDICINE CLINIC | Facility: HOSPITAL | Age: 56
End: 2021-05-24

## 2021-05-24 DIAGNOSIS — Z23 ENCOUNTER FOR IMMUNIZATION: Primary | ICD-10-CM

## 2021-05-24 PROCEDURE — 91301 SARS-COV-2 / COVID-19 MRNA VACCINE (MODERNA) 100 MCG: CPT

## 2021-05-24 PROCEDURE — 0012A SARS-COV-2 / COVID-19 MRNA VACCINE (MODERNA) 100 MCG: CPT

## 2021-05-27 DIAGNOSIS — E78.5 DYSLIPIDEMIA: ICD-10-CM

## 2021-05-27 DIAGNOSIS — F32.A DEPRESSION, UNSPECIFIED DEPRESSION TYPE: ICD-10-CM

## 2021-05-27 RX ORDER — ATORVASTATIN CALCIUM 20 MG/1
20 TABLET, FILM COATED ORAL DAILY
Qty: 90 TABLET | Refills: 1 | Status: SHIPPED | OUTPATIENT
Start: 2021-05-27 | End: 2021-11-26 | Stop reason: SDUPTHER

## 2021-05-27 RX ORDER — BUPROPION HYDROCHLORIDE 300 MG/1
300 TABLET ORAL DAILY
Qty: 90 TABLET | Refills: 1 | Status: SHIPPED | OUTPATIENT
Start: 2021-05-27 | End: 2021-11-26 | Stop reason: SDUPTHER

## 2021-05-27 RX ORDER — VENLAFAXINE HYDROCHLORIDE 150 MG/1
150 CAPSULE, EXTENDED RELEASE ORAL DAILY
Qty: 90 CAPSULE | Refills: 1 | Status: SHIPPED | OUTPATIENT
Start: 2021-05-27 | End: 2021-11-26 | Stop reason: SDUPTHER

## 2021-07-02 ENCOUNTER — TELEPHONE (OUTPATIENT)
Dept: INTERNAL MEDICINE CLINIC | Facility: CLINIC | Age: 56
End: 2021-07-02

## 2021-10-12 LAB
ALBUMIN SERPL-MCNC: 4.5 G/DL (ref 3.6–5.1)
ALBUMIN/GLOB SERPL: 2 (CALC) (ref 1–2.5)
ALP SERPL-CCNC: 46 U/L (ref 35–144)
ALT SERPL-CCNC: 29 U/L (ref 9–46)
AST SERPL-CCNC: 22 U/L (ref 10–35)
BASOPHILS # BLD AUTO: 37 CELLS/UL (ref 0–200)
BASOPHILS NFR BLD AUTO: 0.6 %
BILIRUB SERPL-MCNC: 0.8 MG/DL (ref 0.2–1.2)
BUN SERPL-MCNC: 16 MG/DL (ref 7–25)
BUN/CREAT SERPL: ABNORMAL (CALC) (ref 6–22)
CALCIUM SERPL-MCNC: 9.5 MG/DL (ref 8.6–10.3)
CHLORIDE SERPL-SCNC: 103 MMOL/L (ref 98–110)
CHOLEST SERPL-MCNC: 164 MG/DL
CHOLEST/HDLC SERPL: 2.8 (CALC)
CO2 SERPL-SCNC: 27 MMOL/L (ref 20–32)
CREAT SERPL-MCNC: 0.92 MG/DL (ref 0.7–1.33)
EOSINOPHIL # BLD AUTO: 161 CELLS/UL (ref 15–500)
EOSINOPHIL NFR BLD AUTO: 2.6 %
ERYTHROCYTE [DISTWIDTH] IN BLOOD BY AUTOMATED COUNT: 13.5 % (ref 11–15)
GLOBULIN SER CALC-MCNC: 2.3 G/DL (CALC) (ref 1.9–3.7)
GLUCOSE SERPL-MCNC: 122 MG/DL (ref 65–99)
HCT VFR BLD AUTO: 43.2 % (ref 38.5–50)
HDLC SERPL-MCNC: 59 MG/DL
HGB BLD-MCNC: 14.3 G/DL (ref 13.2–17.1)
LDLC SERPL CALC-MCNC: 83 MG/DL (CALC)
LYMPHOCYTES # BLD AUTO: 2003 CELLS/UL (ref 850–3900)
LYMPHOCYTES NFR BLD AUTO: 32.3 %
MCH RBC QN AUTO: 30.9 PG (ref 27–33)
MCHC RBC AUTO-ENTMCNC: 33.1 G/DL (ref 32–36)
MCV RBC AUTO: 93.3 FL (ref 80–100)
MONOCYTES # BLD AUTO: 589 CELLS/UL (ref 200–950)
MONOCYTES NFR BLD AUTO: 9.5 %
NEUTROPHILS # BLD AUTO: 3410 CELLS/UL (ref 1500–7800)
NEUTROPHILS NFR BLD AUTO: 55 %
NONHDLC SERPL-MCNC: 105 MG/DL (CALC)
PLATELET # BLD AUTO: 223 THOUSAND/UL (ref 140–400)
PMV BLD REES-ECKER: 10.8 FL (ref 7.5–12.5)
POTASSIUM SERPL-SCNC: 4.6 MMOL/L (ref 3.5–5.3)
PROT SERPL-MCNC: 6.8 G/DL (ref 6.1–8.1)
RBC # BLD AUTO: 4.63 MILLION/UL (ref 4.2–5.8)
SL AMB EGFR AFRICAN AMERICAN: 109 ML/MIN/1.73M2
SL AMB EGFR NON AFRICAN AMERICAN: 94 ML/MIN/1.73M2
SODIUM SERPL-SCNC: 137 MMOL/L (ref 135–146)
TRIGL SERPL-MCNC: 127 MG/DL
WBC # BLD AUTO: 6.2 THOUSAND/UL (ref 3.8–10.8)

## 2021-10-15 ENCOUNTER — OFFICE VISIT (OUTPATIENT)
Dept: INTERNAL MEDICINE CLINIC | Facility: CLINIC | Age: 56
End: 2021-10-15
Payer: COMMERCIAL

## 2021-10-15 VITALS
DIASTOLIC BLOOD PRESSURE: 80 MMHG | HEART RATE: 78 BPM | TEMPERATURE: 97.8 F | HEIGHT: 72 IN | BODY MASS INDEX: 35.87 KG/M2 | SYSTOLIC BLOOD PRESSURE: 120 MMHG | OXYGEN SATURATION: 98 % | WEIGHT: 264.8 LBS

## 2021-10-15 DIAGNOSIS — R73.03 PREDIABETES: ICD-10-CM

## 2021-10-15 DIAGNOSIS — R93.2 ABNORMAL LIVER ULTRASOUND: ICD-10-CM

## 2021-10-15 DIAGNOSIS — Z23 NEEDS FLU SHOT: ICD-10-CM

## 2021-10-15 DIAGNOSIS — R03.0 BLOOD PRESSURE ELEVATED WITHOUT HISTORY OF HTN: ICD-10-CM

## 2021-10-15 DIAGNOSIS — F32.A DEPRESSION, UNSPECIFIED DEPRESSION TYPE: ICD-10-CM

## 2021-10-15 DIAGNOSIS — L98.9 SKIN LESION OF FACE: Primary | ICD-10-CM

## 2021-10-15 DIAGNOSIS — Z71.85 VACCINE COUNSELING: ICD-10-CM

## 2021-10-15 DIAGNOSIS — E66.9 OBESITY (BMI 35.0-39.9 WITHOUT COMORBIDITY): ICD-10-CM

## 2021-10-15 DIAGNOSIS — E78.5 DYSLIPIDEMIA: ICD-10-CM

## 2021-10-15 PROCEDURE — 3008F BODY MASS INDEX DOCD: CPT | Performed by: PHYSICIAN ASSISTANT

## 2021-10-15 PROCEDURE — 1036F TOBACCO NON-USER: CPT | Performed by: PHYSICIAN ASSISTANT

## 2021-10-15 PROCEDURE — 99214 OFFICE O/P EST MOD 30 MIN: CPT | Performed by: PHYSICIAN ASSISTANT

## 2021-10-15 PROCEDURE — 90471 IMMUNIZATION ADMIN: CPT

## 2021-10-15 PROCEDURE — 90682 RIV4 VACC RECOMBINANT DNA IM: CPT

## 2021-11-26 DIAGNOSIS — F32.A DEPRESSION, UNSPECIFIED DEPRESSION TYPE: ICD-10-CM

## 2021-11-26 DIAGNOSIS — E78.5 DYSLIPIDEMIA: ICD-10-CM

## 2021-11-26 RX ORDER — VENLAFAXINE HYDROCHLORIDE 150 MG/1
150 CAPSULE, EXTENDED RELEASE ORAL DAILY
Qty: 90 CAPSULE | Refills: 1 | Status: SHIPPED | OUTPATIENT
Start: 2021-11-26 | End: 2022-05-31 | Stop reason: SDUPTHER

## 2021-11-26 RX ORDER — ATORVASTATIN CALCIUM 20 MG/1
20 TABLET, FILM COATED ORAL DAILY
Qty: 90 TABLET | Refills: 1 | Status: SHIPPED | OUTPATIENT
Start: 2021-11-26 | End: 2022-05-31 | Stop reason: SDUPTHER

## 2021-11-26 RX ORDER — BUPROPION HYDROCHLORIDE 300 MG/1
300 TABLET ORAL DAILY
Qty: 90 TABLET | Refills: 1 | Status: SHIPPED | OUTPATIENT
Start: 2021-11-26 | End: 2022-05-31 | Stop reason: SDUPTHER

## 2022-05-31 DIAGNOSIS — E78.5 DYSLIPIDEMIA: ICD-10-CM

## 2022-05-31 DIAGNOSIS — F32.A DEPRESSION, UNSPECIFIED DEPRESSION TYPE: ICD-10-CM

## 2022-05-31 RX ORDER — ATORVASTATIN CALCIUM 20 MG/1
20 TABLET, FILM COATED ORAL DAILY
Qty: 90 TABLET | Refills: 0 | Status: SHIPPED | OUTPATIENT
Start: 2022-05-31

## 2022-05-31 RX ORDER — VENLAFAXINE HYDROCHLORIDE 150 MG/1
150 CAPSULE, EXTENDED RELEASE ORAL DAILY
Qty: 90 CAPSULE | Refills: 0 | Status: SHIPPED | OUTPATIENT
Start: 2022-05-31

## 2022-05-31 RX ORDER — BUPROPION HYDROCHLORIDE 300 MG/1
300 TABLET ORAL DAILY
Qty: 90 TABLET | Refills: 0 | Status: SHIPPED | OUTPATIENT
Start: 2022-05-31

## 2022-05-31 NOTE — TELEPHONE ENCOUNTER
LAST ASSESSED 10/15/2021  NOV - CALLED PT, AGREED TO SCHED 06/17/2022    WILL FILL ENOUGH TO HOLD UNTIL APPT

## 2022-09-07 DIAGNOSIS — E78.5 DYSLIPIDEMIA: ICD-10-CM

## 2022-09-07 DIAGNOSIS — F32.A DEPRESSION, UNSPECIFIED DEPRESSION TYPE: ICD-10-CM

## 2022-09-07 RX ORDER — ATORVASTATIN CALCIUM 20 MG/1
20 TABLET, FILM COATED ORAL DAILY
Qty: 90 TABLET | Refills: 0 | Status: CANCELLED | OUTPATIENT
Start: 2022-09-07

## 2022-09-07 RX ORDER — VENLAFAXINE HYDROCHLORIDE 150 MG/1
150 CAPSULE, EXTENDED RELEASE ORAL DAILY
Qty: 90 CAPSULE | Refills: 1 | Status: CANCELLED | OUTPATIENT
Start: 2022-09-07

## 2022-09-07 RX ORDER — ATORVASTATIN CALCIUM 20 MG/1
20 TABLET, FILM COATED ORAL DAILY
Qty: 90 TABLET | Refills: 1 | Status: CANCELLED | OUTPATIENT
Start: 2022-09-07

## 2022-09-25 LAB
ALBUMIN SERPL-MCNC: 4.4 G/DL (ref 3.6–5.1)
ALBUMIN/GLOB SERPL: 1.8 (CALC) (ref 1–2.5)
ALP SERPL-CCNC: 51 U/L (ref 35–144)
ALT SERPL-CCNC: 27 U/L (ref 9–46)
AST SERPL-CCNC: 20 U/L (ref 10–35)
BASOPHILS # BLD AUTO: 43 CELLS/UL (ref 0–200)
BASOPHILS NFR BLD AUTO: 0.7 %
BILIRUB SERPL-MCNC: 1 MG/DL (ref 0.2–1.2)
BUN SERPL-MCNC: 16 MG/DL (ref 7–25)
BUN/CREAT SERPL: ABNORMAL (CALC) (ref 6–22)
CALCIUM SERPL-MCNC: 9.6 MG/DL (ref 8.6–10.3)
CHLORIDE SERPL-SCNC: 101 MMOL/L (ref 98–110)
CHOLEST SERPL-MCNC: 227 MG/DL
CHOLEST/HDLC SERPL: 4.1 (CALC)
CO2 SERPL-SCNC: 29 MMOL/L (ref 20–32)
CREAT SERPL-MCNC: 0.83 MG/DL (ref 0.7–1.3)
EOSINOPHIL # BLD AUTO: 159 CELLS/UL (ref 15–500)
EOSINOPHIL NFR BLD AUTO: 2.6 %
ERYTHROCYTE [DISTWIDTH] IN BLOOD BY AUTOMATED COUNT: 13.3 % (ref 11–15)
EST. AVERAGE GLUCOSE BLD GHB EST-MCNC: 126 MG/DL
EST. AVERAGE GLUCOSE BLD GHB EST-SCNC: 7 MMOL/L
GFR/BSA.PRED SERPLBLD CYS-BASED-ARV: 103 ML/MIN/1.73M2
GLOBULIN SER CALC-MCNC: 2.4 G/DL (CALC) (ref 1.9–3.7)
GLUCOSE SERPL-MCNC: 113 MG/DL (ref 65–99)
HBA1C MFR BLD: 6 % OF TOTAL HGB
HCT VFR BLD AUTO: 43.6 % (ref 38.5–50)
HDLC SERPL-MCNC: 56 MG/DL
HGB BLD-MCNC: 14.6 G/DL (ref 13.2–17.1)
LDLC SERPL CALC-MCNC: 141 MG/DL (CALC)
LYMPHOCYTES # BLD AUTO: 2050 CELLS/UL (ref 850–3900)
LYMPHOCYTES NFR BLD AUTO: 33.6 %
MCH RBC QN AUTO: 31.4 PG (ref 27–33)
MCHC RBC AUTO-ENTMCNC: 33.5 G/DL (ref 32–36)
MCV RBC AUTO: 93.8 FL (ref 80–100)
MONOCYTES # BLD AUTO: 494 CELLS/UL (ref 200–950)
MONOCYTES NFR BLD AUTO: 8.1 %
NEUTROPHILS # BLD AUTO: 3355 CELLS/UL (ref 1500–7800)
NEUTROPHILS NFR BLD AUTO: 55 %
NONHDLC SERPL-MCNC: 171 MG/DL (CALC)
PLATELET # BLD AUTO: 229 THOUSAND/UL (ref 140–400)
PMV BLD REES-ECKER: 10.5 FL (ref 7.5–12.5)
POTASSIUM SERPL-SCNC: 4.4 MMOL/L (ref 3.5–5.3)
PROT SERPL-MCNC: 6.8 G/DL (ref 6.1–8.1)
RBC # BLD AUTO: 4.65 MILLION/UL (ref 4.2–5.8)
SODIUM SERPL-SCNC: 137 MMOL/L (ref 135–146)
TRIGL SERPL-MCNC: 163 MG/DL
WBC # BLD AUTO: 6.1 THOUSAND/UL (ref 3.8–10.8)

## 2022-09-26 NOTE — RESULT ENCOUNTER NOTE
Hello Team,  Could you please reach out to this patient  His cholesterol increased from last labs and prior to that he has been relatively stable  Could you see if he is still taking lipitor and fish oil as directed and if anything else has changed that could have contributed to it? He also should follow low fat low cholesterol diet  He needs office follow up to further discuss labs in next 3-4 weeks       Thanks Jihan Mckeon

## 2022-09-30 ENCOUNTER — OFFICE VISIT (OUTPATIENT)
Dept: INTERNAL MEDICINE CLINIC | Facility: OTHER | Age: 57
End: 2022-09-30
Payer: COMMERCIAL

## 2022-09-30 VITALS
HEIGHT: 72 IN | OXYGEN SATURATION: 98 % | TEMPERATURE: 97.5 F | SYSTOLIC BLOOD PRESSURE: 140 MMHG | WEIGHT: 258 LBS | DIASTOLIC BLOOD PRESSURE: 90 MMHG | HEART RATE: 85 BPM | BODY MASS INDEX: 34.95 KG/M2

## 2022-09-30 DIAGNOSIS — R73.03 PREDIABETES: ICD-10-CM

## 2022-09-30 DIAGNOSIS — F32.A DEPRESSION, UNSPECIFIED DEPRESSION TYPE: ICD-10-CM

## 2022-09-30 DIAGNOSIS — Z23 NEEDS FLU SHOT: ICD-10-CM

## 2022-09-30 DIAGNOSIS — Z23 NEED FOR VACCINATION FOR H FLU TYPE B: ICD-10-CM

## 2022-09-30 DIAGNOSIS — R06.83 SNORING: ICD-10-CM

## 2022-09-30 DIAGNOSIS — L98.9 SKIN LESION OF FACE: ICD-10-CM

## 2022-09-30 DIAGNOSIS — Z71.85 VACCINE COUNSELING: ICD-10-CM

## 2022-09-30 DIAGNOSIS — E66.9 OBESITY (BMI 35.0-39.9 WITHOUT COMORBIDITY): ICD-10-CM

## 2022-09-30 DIAGNOSIS — E78.5 DYSLIPIDEMIA: ICD-10-CM

## 2022-09-30 DIAGNOSIS — H91.93 DECREASED HEARING OF BOTH EARS: ICD-10-CM

## 2022-09-30 DIAGNOSIS — Z23 NEED FOR SHINGLES VACCINE: ICD-10-CM

## 2022-09-30 DIAGNOSIS — I10 HYPERTENSION, UNSPECIFIED TYPE: Primary | ICD-10-CM

## 2022-09-30 PROCEDURE — 90471 IMMUNIZATION ADMIN: CPT

## 2022-09-30 PROCEDURE — 90750 HZV VACC RECOMBINANT IM: CPT

## 2022-09-30 PROCEDURE — 90682 RIV4 VACC RECOMBINANT DNA IM: CPT

## 2022-09-30 PROCEDURE — 99214 OFFICE O/P EST MOD 30 MIN: CPT

## 2022-09-30 PROCEDURE — 90472 IMMUNIZATION ADMIN EACH ADD: CPT

## 2022-09-30 RX ORDER — ATORVASTATIN CALCIUM 20 MG/1
20 TABLET, FILM COATED ORAL DAILY
Qty: 90 TABLET | Refills: 0 | Status: SHIPPED | OUTPATIENT
Start: 2022-09-30

## 2022-09-30 RX ORDER — BUPROPION HYDROCHLORIDE 150 MG/1
300 TABLET ORAL DAILY
Qty: 30 TABLET | Refills: 2 | Status: SHIPPED | OUTPATIENT
Start: 2022-09-30 | End: 2022-10-18 | Stop reason: SDUPTHER

## 2022-09-30 RX ORDER — VENLAFAXINE HYDROCHLORIDE 150 MG/1
150 CAPSULE, EXTENDED RELEASE ORAL DAILY
Qty: 90 CAPSULE | Refills: 0 | Status: SHIPPED | OUTPATIENT
Start: 2022-09-30

## 2022-09-30 NOTE — PATIENT INSTRUCTIONS
Hypertension  Reviewed BP  Repeated today in office  Recommmend BP med, patient wants to hold at this time  Previously well controlled  Enocuraged weight loss, low salt diet, daily physical activity  Follow up in 3 weeks with BP log  Discussed with patient is BP is not at goal in 3 weeks with review of log recommend starting BP medicaiton  Discussed BP goal <120/<80  Fiance is able to check BP at home and will keep log for him to bring to follow up  Depression  Continue Effexor  Will resume Wellbutrin 150 mg daily  In future will consider further titration  Encouraged daily activity  Journal keeping, stress outlets, fiance is supportive  Dyslipidemia  Reviewed lipid panel  Encouraged reduction of red meat, low fat, low cholesterol diet  Restart Lipitor today  Obesity (BMI 35 0-39 9 without comorbidity)  Small weight loss goals outlined, 1 lb every 1-2 weeks  Snoring  Highly recommend sleep study once again  Weight loss and side sleeping can help  Discussed risks of delaying sleep study  Decreased hearing of both ears  NO formal audiology eval   Recommend audiology testing  Prediabetes  A1c noted  Gave encouragement for low carb diet  Vaccine counseling  Flu today and shingles #2 today  Discussed risks/bennefits of COVID bivalent booster in 4 weeks

## 2022-09-30 NOTE — ASSESSMENT & PLAN NOTE
Reviewed BP  Repeated today in office  Recommmend BP med, patient wants to hold at this time  Previously well controlled  Enocuraged weight loss, low salt diet, daily physical activity  Follow up in 3 weeks with BP log  Discussed with patient is BP is not at goal in 3 weeks with review of log recommend starting BP medicaiton  Discussed BP goal <120/<80  Fiance is able to check BP at home and will keep log for him to bring to follow up    Avoid caffeine and stimulants

## 2022-09-30 NOTE — ASSESSMENT & PLAN NOTE
Reviewed lipid panel  Poor control on most recent lipid panel however patient had been without Lipitor   Encouraged reduction of red meat, low fat, low cholesterol diet  Restart Lipitor today

## 2022-09-30 NOTE — ASSESSMENT & PLAN NOTE
Highly recommend sleep study once again  Weight loss and side sleeping can help  Discussed risks of delaying sleep study

## 2022-09-30 NOTE — ASSESSMENT & PLAN NOTE
Recommend derm eval and tx  Patient has instructions from prior and will call to schedule with derm  Skin lesion unchanged per patient

## 2022-09-30 NOTE — ASSESSMENT & PLAN NOTE
Previously well controlled without meds  Short trial of lifestyle mods but if not at goal at 3 week follow up with review of log will recommend meds  Discussed red flag symptoms and ER precautions which need immediate evaluation and treatment should they develop

## 2022-09-30 NOTE — PROGRESS NOTES
[facility]  Standard Office Visit    Patient ID: Corinne Flow    : 1965  Age/Gender: 56 y o  male     DATE: 2022      Assessment/Plan:    Hypertension  Reviewed BP  Repeated today in office  Recommmend BP med, patient wants to hold at this time  Previously well controlled  Enocuraged weight loss, low salt diet, daily physical activity  Follow up in 3 weeks with BP log  Discussed with patient is BP is not at goal in 3 weeks with review of log recommend starting BP medicaiton  Discussed BP goal <120/<80  Fiance is able to check BP at home and will keep log for him to bring to follow up  Avoid caffeine and stimulants    Depression  Continue Effexor  Will resume Wellbutrin 150 mg daily  In future will consider further titration  Encouraged daily activity  Journal keeping, stress outlets, naomi is supportive  Dyslipidemia  Reviewed lipid panel  Poor control on most recent lipid panel however patient had been without Lipitor   Encouraged reduction of red meat, low fat, low cholesterol diet  Restart Lipitor today  Obesity (BMI 35 0-39 9 without comorbidity)  Small weight loss goals outlined, 1 lb every 1-2 weeks  Gave encouragement with exercise    Snoring  Highly recommend sleep study once again  Weight loss and side sleeping can help  Discussed risks of delaying sleep study  Decreased hearing of both ears  NO formal audiology eval   Recommend audiology testing  Prediabetes  A1c noted  Gave encouragement for low carb diet  Vaccine counseling  Flu today and shingles #2 today  Discussed risks/bennefits of COVID bivalent booster in 4 weeks  Skin lesion of face  Recommend derm eval and tx  Patient has instructions from prior and will call to schedule with derm  Skin lesion unchanged per patient  Blood pressure elevated without history of HTN  Previously well controlled without meds    Short trial of lifestyle mods but if not at goal at 3 week follow up with review of log will recommend meds  Discussed red flag symptoms and ER precautions which need immediate evaluation and treatment should they develop  Diagnoses and all orders for this visit:    Hypertension, unspecified type    Depression, unspecified depression type  -     buPROPion (WELLBUTRIN XL) 150 mg 24 hr tablet; Take 2 tablets (300 mg total) by mouth daily  -     venlafaxine (EFFEXOR-XR) 150 mg 24 hr capsule; Take 1 capsule (150 mg total) by mouth daily    Dyslipidemia  -     atorvastatin (LIPITOR) 20 mg tablet; Take 1 tablet (20 mg total) by mouth daily    Decreased hearing of both ears  -     Ambulatory Referral to Audiology; Future    Obesity (BMI 35 0-39 9 without comorbidity)  -     Diagnostic Sleep Study; Future    Snoring  -     Diagnostic Sleep Study; Future    Prediabetes    Vaccine counseling    Skin lesion of face    Need for vaccination for H flu type B    Needs flu shot  -     influenza vaccine, quadrivalent, recombinant, PF, 0 5 mL, for patients 18 yr+ (FLUBLOK)    Need for shingles vaccine  -     Zoster Vaccine Recombinant IM          BMI Counseling: Body mass index is 35 48 kg/m²  The BMI is above normal  Nutrition recommendations include decreasing portion sizes, encouraging healthy choices of fruits and vegetables, moderation in carbohydrate intake, increasing intake of lean protein, reducing intake of saturated and trans fat and reducing intake of cholesterol  Exercise recommendations include exercising 3-5 times per week  No pharmacotherapy was ordered  Patient referred to PCP  Rationale for BMI follow-up plan is due to patient being overweight or obese  Depression Screening and Follow-up Plan: Patient advised to follow-up with PCP for further management  Subjective:   Chief Complaint   Patient presents with    Follow-up     Follow up   Review Labs done 9/24  Patient is due for tdap, flu and shingles Enrique Rangel is a 56 y o  male who presents to the office on 9/30/2022 for       57 yo male with h/o dyslipidemia, obesity, depression presents to office for chronic condition follow up  Recently moved to new home in [place]  Has had a busy last few months with moving process  Working on home projects  Notes that he has been out of his Wellbutrin and Lipitor for some time  Notes that he was told that he needed an appointment before more refills could be given  Work continues to be stressful  Has a 40 min commute to work each day  NO tobacco use  Has not yet got the sleep study done  Has had the labs  He notes that his diet has been stable  Notes that his fiance does cook from scratch  He does consume red meat 3-4 times per week  NO tobacco use  NO daily exercise  Plans to get back on track with exercise  Took a break due to knee pain he was having earlier this year  Previously enjoyed using a recumbent exercise bike  He notes that his knee pain has gotten better and he plans to get back on track with regular exercise  Notes he has been without Wellbutrin for several weeks  Notes decreased focus  He has been taking effexor (did not run out)  Not taking anything for BP  NO prior use of BP meds  Decreased hearing  Notes exposure to loud music his whole life  NO other ear trauma  Chronic worsening over last few years  No HA or dizziness  H/O tobacco abuse, quit 2018  Smoked roughly 10-16 pack years (0 25- 5 packs per year = <20 pack years)  Awaiting 2nd shingles shot (had first and he has not yet had 2nd)  Plans to get COVID booster  Wants flu shot as well  NO current complaints or concerns at this time  Has not scheduled with derm or sleep study  Previously has sleep study scheduled but he reports he did not keep the appointment  Emy Bee does note that he continues to snore at night  Patient does get on restful sleep          The following portions of the patient's history were reviewed and updated as appropriate: allergies, current medications, past family history, past medical history, past social history, past surgical history and problem list     Review of Systems   Constitutional: Negative for chills, fever and unexpected weight change  HENT: Positive for hearing loss (chornic for several years per patient, bilateral   )  Negative for ear discharge, ear pain, rhinorrhea, sore throat and tinnitus  Eyes: Negative for visual disturbance (wears glasses, denies vision problems)  Respiratory: Negative for cough, shortness of breath and wheezing  Cardiovascular: Negative for chest pain and palpitations  Gastrointestinal: Negative for abdominal pain, anal bleeding, blood in stool, constipation, diarrhea, nausea and vomiting  Had MRI several years ago to follow up changes seen on prior liver imaging  Had Cologuard for screening  Genitourinary: Negative for dysuria, frequency, hematuria and testicular pain  Musculoskeletal: Positive for arthralgias (knee pain improving )  Skin: Negative for rash  Neurological: Negative for dizziness and light-headedness  Psychiatric/Behavioral: Negative for suicidal ideas          See PHQ9         Patient Active Problem List   Diagnosis    Depression    Dyslipidemia    Obesity (BMI 35 0-39 9 without comorbidity)    Snoring    Decreased hearing of both ears    Abnormal liver ultrasound    Screening for malignant neoplasm of colon    Needs flu shot    Blood pressure elevated without history of HTN    Skin lesion of face    Prediabetes    Vaccine counseling    Hypertension       Past Medical History:   Diagnosis Date    Acute suppurative otitis media of left ear without spontaneous rupture of tympanic membrane 01/22/2018    Anxiety     Depression     HL (hearing loss)     Hyperlipidemia     Hypertension     Obesity (BMI 35 0-39 9 without comorbidity) 01/16/2017       Past Surgical History:   Procedure Laterality Date    WISDOM TOOTH EXTRACTION           Current Outpatient Medications:     aspirin 81 MG tablet, Take 1 tablet by mouth daily, Disp: , Rfl:     atorvastatin (LIPITOR) 20 mg tablet, Take 1 tablet (20 mg total) by mouth daily, Disp: 90 tablet, Rfl: 0    buPROPion (WELLBUTRIN XL) 150 mg 24 hr tablet, Take 2 tablets (300 mg total) by mouth daily, Disp: 30 tablet, Rfl: 2    co-enzyme Q-10 30 MG capsule, Take 30 mg by mouth daily, Disp: , Rfl:     Green Tea 150 MG CAPS, Take by mouth daily, Disp: , Rfl:     Multiple Vitamins-Minerals (MULTIVITAMIN ADULT) TABS, Take 1 tablet by mouth daily, Disp: , Rfl:     Omega-3 Fatty Acids ([employer] FISH OIL) 1000 MG CAPS, Take 2 capsules by mouth 2 (two) times a day, Disp: , Rfl:     Turmeric Curcumin 500 MG CAPS, Take 1 capsule by mouth daily, Disp: , Rfl:     venlafaxine (EFFEXOR-XR) 150 mg 24 hr capsule, Take 1 capsule (150 mg total) by mouth daily, Disp: 90 capsule, Rfl: 0    No Known Allergies    Social History     Socioeconomic History    Marital status:      Spouse name: None    Number of children: None    Years of education: None    Highest education level: None   Occupational History    None   Tobacco Use    Smoking status: Former Smoker     Packs/day: 0 50     Years: 25 00     Pack years: 12 50     Types: Cigarettes     Quit date: 2015     Years since quittin 8    Smokeless tobacco: Never Used   Vaping Use    Vaping Use: Never used   Substance and Sexual Activity    Alcohol use:  Yes     Alcohol/week: 4 0 standard drinks     Types: 4 Cans of beer per week    Drug use: No    Sexual activity: Yes   Other Topics Concern    None   Social History Narrative    None     Social Determinants of Health     Financial Resource Strain: Not on file   Food Insecurity: Not on file   Transportation Needs: Not on file   Physical Activity: Not on file   Stress: Not on file   Social Connections: Not on file   Intimate Partner Violence: Not on file Housing Stability: Not on file       Family History   Problem Relation Age of Onset    Diabetes Father         borderline    Hypothyroidism Father     Prostate cancer Father     Heart disease Father     Skin cancer Father     Dementia Father     Hypothyroidism Sister     Fibromyalgia Sister     Heart disease Sister     Ovarian cancer Mother     Skin cancer Mother        PHQ-2/9 Depression Screening    Little interest or pleasure in doing things: 1 - several days  Feeling down, depressed, or hopeless: 1 - several days  Trouble falling or staying asleep, or sleeping too much: 1 - several days  Feeling tired or having little energy: 1 - several days  Poor appetite or overeatin - several days  Feeling bad about yourself - or that you are a failure or have let yourself or your family down: 1 - several days  Trouble concentrating on things, such as reading the newspaper or watching television: 2 - more than half the days  Moving or speaking so slowly that other people could have noticed   Or the opposite - being so fidgety or restless that you have been moving around a lot more than usual: 0 - not at all  Thoughts that you would be better off dead, or of hurting yourself in some way: 0 - not at all  PHQ-9 Score: 8   PHQ-9 Interpretation: Mild depression          Health Maintenance   Topic Date Due    Hepatitis C Screening  Never done    HIV Screening  Never done    Annual Physical  Never done    DTaP,Tdap,and Td Vaccines (1 - Tdap) Never done    COVID-19 Vaccine (3 - Booster for Moderna series) 10/24/2021    Colorectal Cancer Screening  2023    BMI: Followup Plan  2023    BMI: Adult  2023    Influenza Vaccine  Completed    Pneumococcal Vaccine: Pediatrics (0 to 5 Years) and At-Risk Patients (6 to 59 Years)  Aged Out    HIB Vaccine  Aged Out    Hepatitis B Vaccine  Aged Out    IPV Vaccine  Aged Out    Hepatitis A Vaccine  Aged Out    Meningococcal ACWY Vaccine  Aged Out    HPV Vaccine  Aged Out       Immunization History   Administered Date(s) Administered    COVID-19 MODERNA VACC 0 5 ML IM 04/21/2021, 05/24/2021    H1N1, All Formulations 01/14/2010    INFLUENZA 12/07/2015, 11/28/2016    Influenza Quadrivalent Preservative Free 3 years and older IM 12/07/2015    Influenza Quadrivalent, 6-35 Months IM 11/28/2016    Influenza, recombinant, quadrivalent,injectable, preservative free 12/21/2018, 10/11/2019, 12/04/2020, 10/15/2021, 09/30/2022    Zoster Vaccine Recombinant 12/04/2020, 09/30/2022        Objective:  Vitals:    09/30/22 0657 09/30/22 0727 09/30/22 0741   BP: 144/90 146/92 140/90   BP Location: Left arm     Patient Position: Sitting     Cuff Size: Large     Pulse: 85     Temp: 97 5 °F (36 4 °C)     TempSrc: Temporal     SpO2: 98%     Weight: 117 kg (258 lb)     Height: 5' 11 5" (1 816 m)       Wt Readings from Last 3 Encounters:   09/30/22 117 kg (258 lb)   02/25/22 120 kg (264 lb 8 8 oz)   10/15/21 120 kg (264 lb 12 8 oz)     Body mass index is 35 48 kg/m²  No exam data present       Physical Exam  Vitals and nursing note reviewed  Constitutional:       General: He is not in acute distress  Appearance: He is well-developed  He is obese  He is not diaphoretic  Comments: Alert cooperative  Seated in room in no acute distress   HENT:      Head: Normocephalic and atraumatic  Right Ear: Tympanic membrane and ear canal normal  Decreased hearing noted  Tympanic membrane is not erythematous  Left Ear: Tympanic membrane and ear canal normal  Decreased hearing noted  Tympanic membrane is not erythematous  Ears:      Moss exam findings: does not lateralize  Right Rinne: AC > BC  Left Rinne: AC > BC  Comments: Moss does not lateralize  Air conduction greater than bone conduction bilaterally     Mouth/Throat:      Mouth: Mucous membranes are moist       Pharynx: No oropharyngeal exudate or posterior oropharyngeal erythema     Eyes: General: No scleral icterus  Right eye: No discharge  Left eye: No discharge  Pupils: Pupils are equal, round, and reactive to light  Cardiovascular:      Rate and Rhythm: Normal rate and regular rhythm  Heart sounds: Normal heart sounds  No murmur heard  Pulmonary:      Effort: Pulmonary effort is normal  No respiratory distress  Breath sounds: Normal breath sounds  No wheezing or rales  Comments: Clear to auscultation throughout bilaterally  No crackles no rhonchi no wheeze  No respiratory distress  Abdominal:      General: Bowel sounds are normal  There is no distension  Palpations: Abdomen is soft  Tenderness: There is no abdominal tenderness  There is no right CVA tenderness, left CVA tenderness or guarding  Comments: Positive bowel sounds soft nontender nondistended  Obese  Musculoskeletal:      Cervical back: Neck supple  Right lower leg: No edema  Left lower leg: No edema  Lymphadenopathy:      Cervical: No cervical adenopathy  Skin:     General: Skin is warm and dry  Neurological:      General: No focal deficit present  Mental Status: He is alert  Psychiatric:         Mood and Affect: Mood is depressed  Mood is not anxious  Speech: He is communicative  Speech is not rapid and pressured  Behavior: Behavior normal  Behavior is not aggressive or hyperactive  Thought Content: Thought content normal  Thought content does not include homicidal or suicidal ideation  Future Appointments   Date Time Provider Jaqueline Claudia   10/28/2022  7:30 AM Ronnell Severs, PA-C [id number] [address] [place] [address]    Patient Care Team:  Ronnell Severs, PA-C as PCP - General (Internal Medicine)    This note was completed in part utilizing [employer] Software    Grammatical errors, random word insertions, spelling mistakes, and incomplete sentences can be an occasional consequence of this system secondary to software limitations, ambient noise, and hardware issues  If you have any questions or concerns about the content, text, or information contained within the body of this dictation, please contact the provider for clarification

## 2022-10-03 ENCOUNTER — TELEPHONE (OUTPATIENT)
Dept: INTERNAL MEDICINE CLINIC | Age: 57
End: 2022-10-03

## 2022-10-03 NOTE — TELEPHONE ENCOUNTER
Received a request for Prior Authorization for   buPROPion (WELLBUTRIN XL) 150 mg 24 hr tablet     Scanned into Media and sending to [facility]

## 2022-10-18 DIAGNOSIS — F32.A DEPRESSION, UNSPECIFIED DEPRESSION TYPE: ICD-10-CM

## 2022-10-18 RX ORDER — BUPROPION HYDROCHLORIDE 150 MG/1
300 TABLET ORAL DAILY
Qty: 180 TABLET | Refills: 1 | Status: SHIPPED | OUTPATIENT
Start: 2022-10-18 | End: 2022-10-21 | Stop reason: SDUPTHER

## 2022-10-20 ENCOUNTER — TELEPHONE (OUTPATIENT)
Dept: INTERNAL MEDICINE CLINIC | Age: 57
End: 2022-10-20

## 2022-10-20 NOTE — TELEPHONE ENCOUNTER
rec'd prior auth for medication:    buPROPion (WELLBUTRIN XL) 150 mg 24 hr tablet       Scanning into media and sending to [facility] office

## 2022-10-21 DIAGNOSIS — F32.A DEPRESSION, UNSPECIFIED DEPRESSION TYPE: ICD-10-CM

## 2022-10-21 RX ORDER — BUPROPION HYDROCHLORIDE 150 MG/1
150 TABLET ORAL DAILY
Qty: 90 TABLET | Refills: 0 | Status: SHIPPED | OUTPATIENT
Start: 2022-10-21 | End: 2022-12-26 | Stop reason: SDUPTHER

## 2022-10-21 NOTE — TELEPHONE ENCOUNTER
PA done 10/4/22 for medication, approved  Spoke to insurance it will require a PA every month for Wellbutrin 150 two times daily  Are we able to switch him to Wellbutrin 300 mg daily, this dose will not need authorization every month?       Thank you

## 2022-10-21 NOTE — TELEPHONE ENCOUNTER
Marlyn Galindo,  Thank you for point in this out  Patient was to be started on Wellbutrin  mg once daily (not 2 tabs daily)  This is probably why the insurance was bumping it back  I put in a prescription for 1 tab daily  Please let me know if this does not work  Thanks  Jihan Mckeon

## 2022-10-28 ENCOUNTER — OFFICE VISIT (OUTPATIENT)
Dept: INTERNAL MEDICINE CLINIC | Facility: OTHER | Age: 57
End: 2022-10-28

## 2022-10-28 VITALS
WEIGHT: 260.4 LBS | OXYGEN SATURATION: 97 % | BODY MASS INDEX: 35.27 KG/M2 | TEMPERATURE: 98.9 F | HEART RATE: 69 BPM | HEIGHT: 72 IN | DIASTOLIC BLOOD PRESSURE: 78 MMHG | SYSTOLIC BLOOD PRESSURE: 122 MMHG

## 2022-10-28 DIAGNOSIS — F32.A DEPRESSION, UNSPECIFIED DEPRESSION TYPE: ICD-10-CM

## 2022-10-28 DIAGNOSIS — R06.83 SNORING: ICD-10-CM

## 2022-10-28 DIAGNOSIS — I10 HYPERTENSION, UNSPECIFIED TYPE: Primary | ICD-10-CM

## 2022-10-28 DIAGNOSIS — E78.5 DYSLIPIDEMIA: ICD-10-CM

## 2022-10-28 DIAGNOSIS — R93.2 ABNORMAL LIVER ULTRASOUND: ICD-10-CM

## 2022-10-28 PROBLEM — R03.0 BLOOD PRESSURE ELEVATED WITHOUT HISTORY OF HTN: Status: RESOLVED | Noted: 2019-10-11 | Resolved: 2022-10-28

## 2022-10-28 NOTE — PROGRESS NOTES
[facility]  Standard Office Visit  Patient ID: Fran Carlos    : 1965  Age/Gender: 56 y o  male     DATE: 10/28/2022      Assessment/Plan:    Hypertension  Commended patient on weight loss and recent addition exercise  Give encouragement with daily physical activity  Strive for 20 minutes today  Encouraged low-salt diet  Encouraged daily walking throughout the day and healthy diet  Go for sleep study  Patient has order  Follow-up in 4 months  Continue to monitor BP at home  Dyslipidemia  Lipid panel and CMP prior to follow up   4 month follow up  Abnormal liver ultrasound  U/S Liver  with MRI  and  which shows stable appearing lesion  Has not had repeat imaging  Currently without symptoms  Recommend GI consult, last MRI in   Appreciate GI eval regarding whether further imaging is needed or whether follow up scans can be d/c  Patient will call and schedule  Snoring  Recommend he call and schedule sleep study  Discussed risks of untreated ELMER  Patient verbalized understanding and is willing  He will call and schedule  Depression  Doing well at this time on on Effexor and Wellbutrin  Will continue to follow  4 month [facility] follow up with labs  Diagnoses and all orders for this visit:    Hypertension, unspecified type  -     Comprehensive metabolic panel; Future  -     Lipid panel; Future    Dyslipidemia  -     Comprehensive metabolic panel; Future  -     Lipid panel; Future    Abnormal liver ultrasound  -     Ambulatory Referral to Gastroenterology; Future    Snoring    Depression, unspecified depression type          BMI Counseling: Body mass index is 35 81 kg/m²  The BMI is above normal  Nutrition recommendations include moderation in carbohydrate intake, increasing intake of lean protein and reducing intake of cholesterol   Exercise recommendations include exercising 3-5 times per week  No pharmacotherapy was ordered  Patient referred to PCP  Rationale for BMI follow-up plan is due to patient being overweight or obese  Depression Screening and Follow-up Plan: Patient advised to follow-up with PCP for further management  Subjective:   Chief Complaint   Patient presents with   • Follow-up     Follow up on blood pressure         Tim Forbes is a 56 y o  male who presents to the office on 10/28/2022 for       79-year-old male with history of hypertension, anxiety with depression, dyslipidemia, snoring presents to the office for chronic condition follow-up  Prior to last visit he had laboratory studies obtained in these labs were reviewed during our last encounter  He had not been taking all of his medications as directed prior to last visit secondary to running out of his medicines  Since last visit he feels he has gotten back on track  Started to exercise more regularly  He was motivated to help put away some of the moving boxes that were still sitting around his own from his recent moved  Since starting the exercise bike he is gradually trying to increase his stamina  Striving for 20-30 minutes of exercise on a bike per day  He was able to hit his goal of 30 minutes today  NO exertional sx with added exercise  Notes that he does get some knee pain with exercise but notes that glucosamine and chondroitin helps  Feels he is settling in to his home  Taking Wellbutrin as directed along with Lipitor  Taking fish oil once daily and kril oil once daily in evening  He notes sleep study is his #1 priority and his fiance has been telling him that he snores  Has not scheduled hearing eval either but plans to do so  He notes he has been cutting back on red meat, fiance does most of cooking and she has been trying to help  Did have fish this past week  Trying to make small improvements  Had shingles and Flu shot last visit    Tolerated it ok but notes he felt wiped afterwards  He reports that fatigue happens when he gets any shots but sx improved  Patient did check his blood pressure at home today with automated blood pressure cuff  Blood pressure at home with slightly elevated compared to the reading today in the office  Hypertension  This is a recurrent problem  The problem has been gradually improving since onset  The problem is controlled  Associated symptoms include anxiety  Pertinent negatives include no chest pain, palpitations or shortness of breath  Risk factors for coronary artery disease include obesity, male gender and dyslipidemia  There is no history of sleep apnea (No confirm sleep apnea however symptoms suggestive of it  Sent for sleep study  )  The following portions of the patient's history were reviewed and updated as appropriate: allergies, current medications, past family history, past medical history, past social history, past surgical history and problem list     Review of Systems   Constitutional: Negative for chills, fever and unexpected weight change  HENT: Negative for rhinorrhea and sore throat  Respiratory: Negative for cough, shortness of breath and wheezing  Not with exercise or at rest      Cardiovascular: Negative for chest pain and palpitations  NO exertional sx  Has not had CP or palp  Gastrointestinal: Negative for abdominal pain, constipation, diarrhea, nausea and vomiting  Genitourinary: Negative for dysuria and frequency  Skin: Negative for rash  Neurological: Negative for dizziness and light-headedness           Patient Active Problem List   Diagnosis   • Depression   • Dyslipidemia   • Obesity (BMI 35 0-39 9 without comorbidity)   • Snoring   • Decreased hearing of both ears   • Abnormal liver ultrasound   • Screening for malignant neoplasm of colon   • Needs flu shot   • Skin lesion of face   • Prediabetes   • Vaccine counseling   • Hypertension       Past Medical History:   Diagnosis Date   • Acute suppurative otitis media of left ear without spontaneous rupture of tympanic membrane 2018   • Anxiety    • Depression    • HL (hearing loss)    • Hyperlipidemia    • Hypertension    • Obesity (BMI 35 0-39 9 without comorbidity) 2017       Past Surgical History:   Procedure Laterality Date   • WISDOM TOOTH EXTRACTION           Current Outpatient Medications:   •  aspirin 81 MG tablet, Take 1 tablet by mouth daily, Disp: , Rfl:   •  atorvastatin (LIPITOR) 20 mg tablet, Take 1 tablet (20 mg total) by mouth daily, Disp: 90 tablet, Rfl: 0  •  buPROPion (WELLBUTRIN XL) 150 mg 24 hr tablet, Take 1 tablet (150 mg total) by mouth daily, Disp: 90 tablet, Rfl: 0  •  co-enzyme Q-10 30 MG capsule, Take 30 mg by mouth daily, Disp: , Rfl:   •  Green Tea 150 MG CAPS, Take by mouth daily, Disp: , Rfl:   •  Multiple Vitamins-Minerals (MULTIVITAMIN ADULT) TABS, Take 1 tablet by mouth daily, Disp: , Rfl:   •  Omega-3 Fatty Acids ([employer] FISH OIL) 1000 MG CAPS, Take 2 capsules by mouth 2 (two) times a day, Disp: , Rfl:   •  Turmeric Curcumin 500 MG CAPS, Take 1 capsule by mouth daily, Disp: , Rfl:   •  venlafaxine (EFFEXOR-XR) 150 mg 24 hr capsule, Take 1 capsule (150 mg total) by mouth daily, Disp: 90 capsule, Rfl: 0    No Known Allergies    Social History     Socioeconomic History   • Marital status:      Spouse name: None   • Number of children: None   • Years of education: None   • Highest education level: None   Occupational History   • None   Tobacco Use   • Smoking status: Former Smoker     Packs/day: 0 50     Years: 25 00     Pack years: 12 50     Types: Cigarettes     Start date: 200     Quit date: 2015     Years since quittin 9   • Smokeless tobacco: Never Used   Vaping Use   • Vaping Use: Never used   Substance and Sexual Activity   • Alcohol use:  Yes     Alcohol/week: 4 0 standard drinks     Types: 4 Cans of beer per week   • Drug use: No   • Sexual activity: Yes   Other Topics Concern   • None   Social History Narrative   • None     Social Determinants of Health     Financial Resource Strain: Not on file   Food Insecurity: Not on file   Transportation Needs: Not on file   Physical Activity: Not on file   Stress: Not on file   Social Connections: Not on file   Intimate Partner Violence: Not on file   Housing Stability: Not on file       Family History   Problem Relation Age of Onset   • Diabetes Father         borderline   • Hypothyroidism Father    • Prostate cancer Father    • Heart disease Father    • Skin cancer Father    • Dementia Father    • Hypothyroidism Sister    • Fibromyalgia Sister    • Heart disease Sister    • Ovarian cancer Mother    • Skin cancer Mother        PHQ-2/9 Depression Screening    Little interest or pleasure in doing things: 0 - not at all  Feeling down, depressed, or hopeless: 1 - several days  Trouble falling or staying asleep, or sleeping too much: 0 - not at all  Feeling tired or having little energy: 1 - several days  Poor appetite or overeatin - several days  Feeling bad about yourself - or that you are a failure or have let yourself or your family down: 0 - not at all  Trouble concentrating on things, such as reading the newspaper or watching television: 1 - several days  Moving or speaking so slowly that other people could have noticed   Or the opposite - being so fidgety or restless that you have been moving around a lot more than usual: 0 - not at all  Thoughts that you would be better off dead, or of hurting yourself in some way: 0 - not at all  PHQ-9 Score: 4   PHQ-9 Interpretation: No or Minimal depression          Health Maintenance   Topic Date Due   • Hepatitis C Screening  Never done   • HIV Screening  Never done   • Annual Physical  Never done   • DTaP,Tdap,and Td Vaccines (1 - Tdap) Never done   • COVID-19 Vaccine (3 - Booster for Moderna series) 10/24/2021   • Colorectal Cancer Screening  2023   • BMI: Followup Plan  2023   • BMI: Adult 10/28/2023   • Influenza Vaccine  Completed   • Pneumococcal Vaccine: Pediatrics (0 to 5 Years) and At-Risk Patients (6 to 59 Years)  Aged Out   • HIB Vaccine  Aged Out   • Hepatitis B Vaccine  Aged Out   • IPV Vaccine  Aged Out   • Hepatitis A Vaccine  Aged Out   • Meningococcal ACWY Vaccine  Aged Out   • HPV Vaccine  Aged [employer] History   Administered Date(s) Administered   • COVID-19 MODERNA VACC 0 5 ML IM 04/21/2021, 05/24/2021   • H1N1, All Formulations 01/14/2010   • INFLUENZA 12/07/2015, 11/28/2016, 09/30/2022   • Influenza Quadrivalent Preservative Free 3 years and older IM 12/07/2015   • Influenza Quadrivalent, 6-35 Months IM 11/28/2016   • Influenza, recombinant, quadrivalent,injectable, preservative free 12/21/2018, 10/11/2019, 12/04/2020, 10/15/2021, 09/30/2022   • Zoster Vaccine Recombinant 12/04/2020, 09/30/2022, 09/30/2022        Objective:  Vitals:    10/28/22 0746 10/28/22 0813   BP: 126/88 122/78   BP Location: Left arm Left arm   Patient Position: Sitting    Cuff Size: Large    Pulse: 69    Temp: 98 9 °F (37 2 °C)    TempSrc: Temporal    SpO2: 97%    Weight: 118 kg (260 lb 6 4 oz)    Height: 5' 11 5" (1 816 m)      Wt Readings from Last 3 Encounters:   10/28/22 118 kg (260 lb 6 4 oz)   09/30/22 117 kg (258 lb)   02/25/22 120 kg (264 lb 8 8 oz)     Body mass index is 35 81 kg/m²  No exam data present       Physical Exam  Vitals and nursing note reviewed  Constitutional:       General: He is not in acute distress  Appearance: He is well-developed  He is obese  He is not ill-appearing, toxic-appearing or diaphoretic  HENT:      Head: Normocephalic and atraumatic  Mouth/Throat:      Mouth: Mucous membranes are moist       Pharynx: No oropharyngeal exudate or posterior oropharyngeal erythema  Comments: Mallampati 2  Eyes:      General: No scleral icterus  Right eye: No discharge  Left eye: No discharge        Pupils: Pupils are equal, round, and reactive to light  Cardiovascular:      Rate and Rhythm: Normal rate and regular rhythm  Heart sounds: Normal heart sounds  No murmur heard  Pulmonary:      Effort: Pulmonary effort is normal  No respiratory distress  Breath sounds: Normal breath sounds  No wheezing or rales  Abdominal:      Palpations: Abdomen is soft  Musculoskeletal:      Cervical back: Neck supple  Right lower leg: No edema  Left lower leg: No edema  Lymphadenopathy:      Cervical: No cervical adenopathy  Skin:     General: Skin is warm and dry  Neurological:      General: No focal deficit present  Mental Status: He is alert  Psychiatric:         Mood and Affect: Mood normal          Behavior: Behavior normal          Thought Content: Thought content normal              Future Appointments   Date Time Provider Jaqueline Taylor   3/3/2023  7:00 AM Roger Martinez PA-C [id number] [facility] PRIMARY CARE [address] [place] [address]    Patient Care Team:  Roger Martinez PA-C as PCP - General (Internal Medicine)    This note was completed in part utilizing [employer] Fluency Direct Software  Grammatical errors, random word insertions, spelling mistakes, and incomplete sentences can be an occasional consequence of this system secondary to software limitations, ambient noise, and hardware issues  If you have any questions or concerns about the content, text, or information contained within the body of this dictation, please contact the provider for clarification

## 2022-10-28 NOTE — ASSESSMENT & PLAN NOTE
U/S Liver 2018 with MRI 2019 and 2020 which shows stable appearing lesion  Has not had repeat imaging  Currently without symptoms  Recommend GI consult, last MRI in 2020  Appreciate GI eval regarding whether further imaging is needed or whether follow up scans can be d/c  Patient will call and schedule

## 2022-10-28 NOTE — ASSESSMENT & PLAN NOTE
Commended patient on weight loss and recent addition exercise  Give encouragement with daily physical activity  Strive for 20 minutes today  Encouraged low-salt diet  Encouraged daily walking throughout the day and healthy diet  Go for sleep study  Patient has order  Follow-up in 4 months  Continue to monitor BP at home

## 2022-10-28 NOTE — ASSESSMENT & PLAN NOTE
Doing well at this time on on Effexor and Wellbutrin  Will continue to follow  4 month [facility] follow up with labs

## 2022-10-28 NOTE — PATIENT INSTRUCTIONS
Hypertension  Commended patient on weight loss and recent addition exercise  Give encouragement with daily physical activity  Strive for 20 minutes today  Encouraged low-salt diet  Encouraged daily walking throughout the day and healthy diet  Go for sleep study  Patient has order  Follow-up in 4 months  Continue to monitor BP at home  Dyslipidemia  Lipid panel and CMp prior to follow up   4 month follow up  Abnormal liver ultrasound  Recommend GI consult, last MRI in 2020  Appreciate GI eval regarding whether further imaging is needed or whether follow up scans can be d/c  Snoring  Schedule sleep study

## 2022-10-28 NOTE — ASSESSMENT & PLAN NOTE
Recommend he call and schedule sleep study  Discussed risks of untreated ELMER  Patient verbalized understanding and is willing  He will call and schedule

## 2022-12-01 ENCOUNTER — APPOINTMENT (EMERGENCY)
Dept: RADIOLOGY | Facility: HOSPITAL | Age: 57
End: 2022-12-01

## 2022-12-01 ENCOUNTER — HOSPITAL ENCOUNTER (EMERGENCY)
Facility: HOSPITAL | Age: 57
Discharge: HOME | End: 2022-12-01
Attending: EMERGENCY MEDICINE

## 2022-12-01 VITALS
BODY MASS INDEX: 35.76 KG/M2 | SYSTOLIC BLOOD PRESSURE: 126 MMHG | OXYGEN SATURATION: 97 % | WEIGHT: 260 LBS | HEART RATE: 76 BPM | DIASTOLIC BLOOD PRESSURE: 85 MMHG | RESPIRATION RATE: 18 BRPM | TEMPERATURE: 98 F

## 2022-12-01 DIAGNOSIS — S92.309A METATARSAL FRACTURE: Primary | ICD-10-CM

## 2022-12-01 RX ORDER — IBUPROFEN 400 MG/1
400 TABLET ORAL ONCE
Status: COMPLETED | OUTPATIENT
Start: 2022-12-01 | End: 2022-12-01

## 2022-12-01 RX ADMIN — IBUPROFEN 400 MG: 400 TABLET, FILM COATED ORAL at 14:10

## 2022-12-01 NOTE — DISCHARGE INSTRUCTIONS
Intermittent ice and elevation next 2 days    Tylenol or Motrin (with food) may be taken for pain  Follow-up with podiatrist (Dr Nico Mccain) or [address] [place] [address] group   Call for appointment    Return to the ED for increased pain, worsening since

## 2022-12-01 NOTE — ED PROVIDER NOTES
History  Chief Complaint   Patient presents with   • Foot Injury     Tripped and fell  Pain to the top of the right foot  Denies head strike  Patient is a 82-year-old white male with history of anxiety, depression, hyperlipidemia, hypertension who reports right foot injury occurring 2 hours ago at work  States he tripped over computer speaker twisting the right foot  He has had painful ambulation since  He reports no right ankle pain or right lower leg pain  Denies head trauma or LOC  Denies neck pain or back pain  Denies any other extremity injury  No other complaints           Prior to Admission Medications   Prescriptions Last Dose Informant Patient Reported? Taking?    Green Tea 150 MG CAPS   Yes No   Sig: Take by mouth daily   Multiple Vitamins-Minerals (MULTIVITAMIN ADULT) TABS   Yes No   Sig: Take 1 tablet by mouth daily   Omega-3 Fatty Acids ([employer]S FISH OIL) 1000 MG CAPS   Yes No   Sig: Take 2 capsules by mouth 2 (two) times a day   Turmeric Curcumin 500 MG CAPS   Yes No   Sig: Take 1 capsule by mouth daily   aspirin 81 MG tablet   Yes No   Sig: Take 1 tablet by mouth daily   atorvastatin (LIPITOR) 20 mg tablet   No No   Sig: Take 1 tablet (20 mg total) by mouth daily   buPROPion (WELLBUTRIN XL) 150 mg 24 hr tablet   No No   Sig: Take 1 tablet (150 mg total) by mouth daily   co-enzyme Q-10 30 MG capsule   Yes No   Sig: Take 30 mg by mouth daily   venlafaxine (EFFEXOR-XR) 150 mg 24 hr capsule   No No   Sig: Take 1 capsule (150 mg total) by mouth daily      Facility-Administered Medications: None       Past Medical History:   Diagnosis Date   • Acute suppurative otitis media of left ear without spontaneous rupture of tympanic membrane 01/22/2018   • Anxiety    • Depression    • HL (hearing loss)    • Hyperlipidemia    • Hypertension    • Obesity (BMI 35 0-39 9 without comorbidity) 01/16/2017       Past Surgical History:   Procedure Laterality Date   • WISDOM TOOTH EXTRACTION         Family History Problem Relation Age of Onset   • Diabetes Father         borderline   • Hypothyroidism Father    • Prostate cancer Father    • Heart disease Father    • Skin cancer Father    • Dementia Father    • Hypothyroidism Sister    • Fibromyalgia Sister    • Heart disease Sister    • Ovarian cancer Mother    • Skin cancer Mother      I have reviewed and agree with the history as documented  E-Cigarette/Vaping   • E-Cigarette Use Never User      E-Cigarette/Vaping Substances   • Nicotine No    • THC No    • CBD No    • Flavoring No    • Other No      Social History     Tobacco Use   • Smoking status: Former     Packs/day: 0 50     Years: 25 00     Pack years: 12 50     Types: Cigarettes     Start date: 200     Quit date: 2015     Years since quittin 0   • Smokeless tobacco: Never   Vaping Use   • Vaping Use: Never used   Substance Use Topics   • Alcohol use: Yes     Alcohol/week: 4 0 standard drinks     Types: 4 Cans of beer per week   • Drug use: No       Review of Systems   Constitutional: Negative for chills and fever  HENT: Negative for ear pain and sore throat  Respiratory: Negative for cough and shortness of breath  Cardiovascular: Negative for chest pain and palpitations  Gastrointestinal: Negative for abdominal pain, nausea and vomiting  Genitourinary: Negative for hematuria  Musculoskeletal: Negative for arthralgias, back pain, joint swelling and neck pain  Skin: Negative for color change and rash  Neurological: Negative for syncope  All other systems reviewed and are negative  Physical Exam  Physical Exam  Vitals and nursing note reviewed  Constitutional:       General: He is not in acute distress  Appearance: Normal appearance  He is not ill-appearing, toxic-appearing or diaphoretic  HENT:      Nose: Nose normal       Mouth/Throat:      Mouth: Mucous membranes are moist       Pharynx: Oropharynx is clear     Eyes:      Conjunctiva/sclera: Conjunctivae normal  Pupils: Pupils are equal, round, and reactive to light  Cardiovascular:      Rate and Rhythm: Normal rate and regular rhythm  Pulses: Normal pulses  Heart sounds: Normal heart sounds  Pulmonary:      Effort: Pulmonary effort is normal       Breath sounds: Normal breath sounds  Musculoskeletal:         General: No deformity  Normal range of motion  Cervical back: Normal range of motion and neck supple  Comments: Tenderness R proximal 5th MT  No ecchymosis  No swelling  No deformity  Remainder right leg exam is nontender and  neurovascular intact   Skin:     Capillary Refill: Capillary refill takes less than 2 seconds  Neurological:      Mental Status: He is alert and oriented to person, place, and time  Mental status is at baseline  Sensory: No sensory deficit  Vital Signs  ED Triage Vitals [12/01/22 1338]   Temperature Pulse Respirations Blood Pressure SpO2   98 °F (36 7 °C) 76 18 126/85 97 %      Temp src Heart Rate Source Patient Position - Orthostatic VS BP Location FiO2 (%)   -- -- -- -- --      Pain Score       8           Vitals:    12/01/22 1338   BP: 126/85   Pulse: 76         Visual Acuity      ED Medications  Medications   ibuprofen (MOTRIN) tablet 400 mg (400 mg Oral Given 12/1/22 1410)       Diagnostic Studies  Results Reviewed     None                 XR foot 3+ views RIGHT    (Results Pending)              Procedures  Procedures         ED Course                               SBIRT 20yo+    Flowsheet Row Most Recent Value   SBIRT (25 yo +)    In order to provide better care to our patients, we are screening all of our patients for alcohol and drug use  Would it be okay to ask you these screening questions? No Filed at: 12/01/2022 1351                    MDM  Number of Diagnoses or Management Options  Metatarsal fracture  Diagnosis management comments: 73-year-old white male with twisting injury right foot    Patient with proximal right 5th metatarsal fracture  Surgical shoe and crutches given  Ice and elevation advised  Tylenol or Motrin (with food) as needed for pain  Advised follow-up with podiatrist or [facility] group  Return precautions given        Amount and/or Complexity of Data Reviewed  Tests in the radiology section of CPT®: reviewed  Decide to obtain previous medical records or to obtain history from someone other than the patient: yes  Review and summarize past medical records: yes  Independent visualization of images, tracings, or specimens: yes    Risk of Complications, Morbidity, and/or Mortality  Presenting problems: low  Diagnostic procedures: low    Patient Progress  Patient progress: stable      Disposition  Final diagnoses:   Metatarsal fracture     Time reflects when diagnosis was documented in both MDM as applicable and the Disposition within this note     Time User Action Codes Description Comment    12/1/2022  3:06 PM Kieransummer Johnathan Add [[id number]] Metatarsal fracture       ED Disposition     ED Disposition   Discharge    Condition   Stable    Date/Time   u Dec 1, 2022  3:06 PM    Comment   Jacob Naik discharge to home/self care  Follow-up Information     Follow up With Specialties Details Why Contact Info Additional Information    [facility]   [address] [place] [address] Orthopedic Surgery   [address] [place] [address], [address] [address] [phone] [address] [place] [address],  [address], [address] [phone]          Patient's Medications   Discharge Prescriptions    No medications on file       No discharge procedures on file      PDMP Review     None          ED Provider  Electronically Signed by           Huong Hunt PA-C  12/01/22 0662

## 2022-12-02 ENCOUNTER — TELEPHONE (OUTPATIENT)
Dept: SLEEP CENTER | Facility: CLINIC | Age: 57
End: 2022-12-02

## 2022-12-02 ENCOUNTER — OFFICE VISIT (OUTPATIENT)
Dept: OBGYN CLINIC | Facility: CLINIC | Age: 57
End: 2022-12-02

## 2022-12-02 VITALS
HEART RATE: 80 BPM | BODY MASS INDEX: 36.4 KG/M2 | SYSTOLIC BLOOD PRESSURE: 128 MMHG | DIASTOLIC BLOOD PRESSURE: 82 MMHG | HEIGHT: 71 IN | WEIGHT: 260 LBS

## 2022-12-02 DIAGNOSIS — S99.191A CLOSED FRACTURE OF BASE OF FIFTH METATARSAL BONE OF RIGHT FOOT AT METAPHYSEAL-DIAPHYSEAL JUNCTION, INITIAL ENCOUNTER: ICD-10-CM

## 2022-12-02 DIAGNOSIS — S92.354A CLOSED NONDISPLACED FRACTURE OF FIFTH METATARSAL BONE OF RIGHT FOOT, INITIAL ENCOUNTER: Primary | ICD-10-CM

## 2022-12-02 PROBLEM — S92.356A CLOSED NONDISPLACED FRACTURE OF FIFTH METATARSAL BONE: Status: ACTIVE | Noted: 2022-12-02

## 2022-12-02 NOTE — TELEPHONE ENCOUNTER
----- Message from Tim Cao MD sent at 12/2/2022 11:30 AM EST -----  approved  ----- Message -----  From: Buzz Brewer  Sent: 11/28/2022   9:27 AM EST  To: Sleep Medicine [facility] Provider    This Diagnostic sleep study needs approval      If approved please sign and return to clerical pool  If denied please include reasons why  Also provide alternative testing if warranted  Please sign and return to clerical pool

## 2022-12-02 NOTE — RESULT ENCOUNTER NOTE
Hello team,  Please contact patient  He was seen in the emergency room for a acute nondisplaced fracture of the right 5th metatarsal and was discharged home  It was recommended that he schedule follow-up with [address] [place] [address] Specialists however I do not see the yet that this appointment was made  Please see the patient calls in schedules with orthopedics regarding his fracture and lateral office know if he has any difficulty getting an appointment  Thanks  Marci Sanchez

## 2022-12-02 NOTE — TELEPHONE ENCOUNTER
----- Message from Paul Issa MD sent at 12/2/2022 11:30 AM EST -----  approved  ----- Message -----  From: Martin Narvaez  Sent: 11/28/2022   9:27 AM EST  To: Sleep Medicine [facility] Provider    This Diagnostic sleep study needs approval      If approved please sign and return to clerical pool  If denied please include reasons why  Also provide alternative testing if warranted  Please sign and return to clerical pool

## 2022-12-02 NOTE — PATIENT INSTRUCTIONS
F/u 1 wk  XR- R foot 1 wk  SLC  No weight bearing/crutches  Wheelchair due to difficulty using crutches  Aspirin 81 mg 2x daily  Elevation/OTC pain meds as needed 
175.26

## 2022-12-02 NOTE — LETTER
December 2, 2022     Patient: Steffany Larkin  YOB: 1965  Date of Visit: 12/2/2022      To Whom it May Concern:    Steffany Larkin is under my professional care  Rogres Oscar was seen in my office on 12/2/2022  Rogers Oscar may return to work with limitations - sedentary duty only  Crutches or wheelchair at all times  No driving  If you have any questions or concerns, please don't hesitate to call           Sincerely,          Janki Dumont MD        CC: Steffany Chilelman

## 2022-12-02 NOTE — PROGRESS NOTES
[facility] [place] [address]  [facility] [id number] [phone]      Chief Complaint:  Chief Complaint   Patient presents with   • Right Foot - Pain       Vitals:  /82   Pulse 80   Ht 5' 11" (1 803 m)   Wt 118 kg (260 lb)   BMI 36 26 kg/m²     The following portions of the patient's history were reviewed and updated as appropriate: allergies, current medications, past family history, past medical history, past social history, past surgical history, and problem list       Subjective:   Patient ID: Jacob Naik is a 56 y o  male  Here for [facility] R foot pain/prox 5th metatarsal fx  Yesterday am he was tangled in some wires and rolled his R foot and fell  Seen in ER  Xr done placed in post op shoe  Took tramadol last pm due to pain  Sharp pain with walking/better sitting  Ibuprofen PRN  RIGHT FOOT     INDICATION:   tripper over computer speaker, R foot pain      COMPARISON:  None     VIEWS:  XR FOOT 3+ VW RIGHT         FINDINGS:     Acute nondisplaced fracture of the proximal fifth metatarsal      No dislocation      Mild hallux valgus deformity  Mild valgus deformity of the second through fifth MTP joints      No significant degenerative changes      No lytic or blastic osseous lesion      Soft tissues are unremarkable      IMPRESSION:     Acute nondisplaced fracture of the proximal fifth metatarsal          Review of Systems   Constitutional: Negative for fatigue and fever  Respiratory: Negative for shortness of breath  Cardiovascular: Negative for chest pain  Gastrointestinal: Negative for abdominal pain and nausea  Genitourinary: Negative for dysuria  Musculoskeletal: Positive for arthralgias and gait problem  Skin: Negative for rash and wound  Neurological: Negative for weakness and headaches  Objective:  Ortho Exam    Physical Exam  Vitals and nursing note reviewed     Constitutional:       Appearance: Normal appearance  He is well-developed  HENT:      Head: Normocephalic  Mouth/Throat:      Mouth: Mucous membranes are moist    Eyes:      Extraocular Movements: Extraocular movements intact  Cardiovascular:      Rate and Rhythm: Normal rate and regular rhythm  Heart sounds: Normal heart sounds  Pulmonary:      Effort: Pulmonary effort is normal       Breath sounds: Normal breath sounds  Abdominal:      General: Bowel sounds are normal       Palpations: Abdomen is soft  Musculoskeletal:         General: Tenderness present  Normal range of motion  Cervical back: Normal range of motion  Comments: R foot- 5th prox metatarsal swelling/TTP   Skin:     General: Skin is warm and dry  Neurological:      General: No focal deficit present  Mental Status: He is alert and oriented to person, place, and time  Psychiatric:         Mood and Affect: Mood normal          Behavior: Behavior normal          Thought Content: Thought content normal        Cast application    Date/Time: 12/2/2022 2:08 PM  Performed by: Elan Russo MD  Authorized by: Elan Russo MD   Universal Protocol:  Consent: Verbal consent obtained  Risks and benefits: risks, benefits and alternatives were discussed  Consent given by: patient  Timeout called at: 12/2/2022 2:08 PM     Pre-procedure details:     Sensation:  Normal  Procedure details:     Laterality:  Right    Location:  Foot    Foot:  R footCast type:  Short leg      Supplies:  Cotton padding and fiberglass      I have personally reviewed pertinent films in PACS and my interpretation is XR-  R foot- prox 5th metatarsal nondisplaced fx        Assessment/Plan:  Assessment/Plan   Diagnoses and all orders for this visit:    Closed nondisplaced fracture of fifth metatarsal bone of right foot, initial encounter  -     Wheelchair    Closed fracture of base of fifth metatarsal bone of right foot at metaphyseal-diaphyseal junction, initial encounter  - Wheelchair    Other orders  -     Cast application        Return in about 1 week (around 12/9/2022) for Recheck       Conrda Mcelroy MD

## 2022-12-06 ENCOUNTER — TELEPHONE (OUTPATIENT)
Dept: INTERNAL MEDICINE CLINIC | Facility: OTHER | Age: 57
End: 2022-12-06

## 2022-12-06 NOTE — TELEPHONE ENCOUNTER
Patient calling to see if we are able to change the diagnostic sleep study to a at home sleep study  He would prefer to do a a home test instead  He is also asking if we can place another referral in his chart for dermatology for his skin lesions

## 2022-12-09 ENCOUNTER — OFFICE VISIT (OUTPATIENT)
Dept: OBGYN CLINIC | Facility: CLINIC | Age: 57
End: 2022-12-09

## 2022-12-09 ENCOUNTER — APPOINTMENT (OUTPATIENT)
Dept: RADIOLOGY | Facility: CLINIC | Age: 57
End: 2022-12-09

## 2022-12-09 VITALS
SYSTOLIC BLOOD PRESSURE: 138 MMHG | BODY MASS INDEX: 36.4 KG/M2 | HEIGHT: 71 IN | DIASTOLIC BLOOD PRESSURE: 85 MMHG | WEIGHT: 260 LBS | HEART RATE: 83 BPM

## 2022-12-09 DIAGNOSIS — L98.9 SKIN LESION OF FACE: ICD-10-CM

## 2022-12-09 DIAGNOSIS — I10 HYPERTENSION, UNSPECIFIED TYPE: ICD-10-CM

## 2022-12-09 DIAGNOSIS — H91.93 DECREASED HEARING OF BOTH EARS: Primary | ICD-10-CM

## 2022-12-09 DIAGNOSIS — E66.9 OBESITY (BMI 35.0-39.9 WITHOUT COMORBIDITY): ICD-10-CM

## 2022-12-09 DIAGNOSIS — S92.351D FRACTURE OF BASE OF FIFTH METATARSAL BONE OF RIGHT FOOT WITH ROUTINE HEALING, SUBSEQUENT ENCOUNTER: ICD-10-CM

## 2022-12-09 DIAGNOSIS — S92.351D FRACTURE OF BASE OF FIFTH METATARSAL BONE OF RIGHT FOOT WITH ROUTINE HEALING, SUBSEQUENT ENCOUNTER: Primary | ICD-10-CM

## 2022-12-09 NOTE — PROGRESS NOTES
[facility] [place]  [address]  [facility] [id number] [phone]      Chief Complaint:  Chief Complaint   Patient presents with   • Right Foot - Follow-up       Vitals:  /85   Pulse 83   Ht 5' 11" (1 803 m)   Wt 118 kg (260 lb)   BMI 36 26 kg/m²     The following portions of the patient's history were reviewed and updated as appropriate: allergies, current medications, past family history, past medical history, past social history, past surgical history, and problem list       Subjective:   Patient ID: Daniela Mancia is a 56 y o  male  Here for f/u WC R foot pain/prox 5th metatarsal fx  Having less pain  Using cruches- staying off foot  Taking ASA BID    Review of Systems   Constitutional: Negative for fatigue and fever  Respiratory: Negative for shortness of breath  Cardiovascular: Negative for chest pain  Gastrointestinal: Negative for abdominal pain and nausea  Genitourinary: Negative for dysuria  Musculoskeletal: Positive for arthralgias  Skin: Negative for rash and wound  Neurological: Negative for weakness and headaches  Objective:  Ortho Exam    Physical Exam  Constitutional:       Appearance: Normal appearance  He is normal weight  Eyes:      Extraocular Movements: Extraocular movements intact  Pulmonary:      Effort: Pulmonary effort is normal    Musculoskeletal:      Cervical back: Normal range of motion  Comments: R foot- NVI in cast   Skin:     General: Skin is warm and dry  Neurological:      General: No focal deficit present  Mental Status: He is alert and oriented to person, place, and time  Mental status is at baseline  Psychiatric:         Mood and Affect: Mood normal          Behavior: Behavior normal          Thought Content:  Thought content normal          Judgment: Judgment normal          I have personally reviewed pertinent films in PACS and my interpretation is XR-  R foot- no change in prox 5th metatarsal fx  Assessment/Plan:  Assessment/Plan   Diagnoses and all orders for this visit:    Fracture of base of fifth metatarsal bone of right foot with routine healing, subsequent encounter  -     XR foot 3+ vw right; Future        Return in about 2 weeks (around 12/23/2022) for Recheck       Julio Samuels MD

## 2022-12-09 NOTE — TELEPHONE ENCOUNTER
Patient was called and notified and was given the phone numbers to call and schedule the appointments

## 2022-12-09 NOTE — LETTER
December 9, 2022     Patient: Norberto Gilbert  YOB: 1965  Date of Visit: 12/9/2022      To Whom it May Concern:    Norberto Gilbert is under my professional care  Prince Saunders was seen in my office on 12/9/2022  Prince Saunders may return to work with limitations - sedentary duty only  He must wear cast and crutches at all times  No driving  If you have any questions or concerns, please don't hesitate to call           Sincerely,          Lan Simms MD        CC: Norberto Ofelia

## 2022-12-09 NOTE — PATIENT INSTRUCTIONS
F/u 2 wks  Will remove cast in 2 wks and transition to boot  Continue non weight bearing using crutches  Continue in cast for 2 more weeks  Continue ASA 81 mg 2x daily

## 2022-12-23 ENCOUNTER — OFFICE VISIT (OUTPATIENT)
Dept: OBGYN CLINIC | Facility: CLINIC | Age: 57
End: 2022-12-23

## 2022-12-23 ENCOUNTER — APPOINTMENT (OUTPATIENT)
Dept: RADIOLOGY | Facility: CLINIC | Age: 57
End: 2022-12-23

## 2022-12-23 VITALS
SYSTOLIC BLOOD PRESSURE: 130 MMHG | HEIGHT: 71 IN | DIASTOLIC BLOOD PRESSURE: 83 MMHG | HEART RATE: 94 BPM | BODY MASS INDEX: 36.26 KG/M2

## 2022-12-23 DIAGNOSIS — S92.351D FRACTURE OF BASE OF FIFTH METATARSAL BONE OF RIGHT FOOT WITH ROUTINE HEALING, SUBSEQUENT ENCOUNTER: Primary | ICD-10-CM

## 2022-12-23 DIAGNOSIS — S92.351D FRACTURE OF BASE OF FIFTH METATARSAL BONE OF RIGHT FOOT WITH ROUTINE HEALING, SUBSEQUENT ENCOUNTER: ICD-10-CM

## 2022-12-23 NOTE — PATIENT INSTRUCTIONS
F/u 2 wks  Begin using boot  Continue non weight bearing for 1 more week  Week #2- Begin gradual return to weight bearing on R foot with use of crutches  Cast removed today  Continue ASA for 2 more weeks

## 2022-12-23 NOTE — PROGRESS NOTES
[facility] SPECIAL[facility] [place] [address]  [facility] [id number] [phone]      Chief Complaint:  Chief Complaint   Patient presents with   • Right Foot - Follow-up       Vitals:  /83 (BP Location: Right arm, Patient Position: Sitting, Cuff Size: Standard)   Pulse 94   Ht 5' 11" (1 803 m)   BMI 36 26 kg/m²     The following portions of the patient's history were reviewed and updated as appropriate: allergies, current medications, past family history, past medical history, past social history, past surgical history, and problem list       Subjective:   Patient ID: Chapito Raymundo is a 56 y o  male  Here for f/u WC R foot pain/prox 5th metatarsal fx  Mild pain occasionally  No issus with cast  Using cruches- staying off foot  Taking ASA BID      Review of Systems   Constitutional: Negative for fatigue and fever  Respiratory: Negative for shortness of breath  Cardiovascular: Negative for chest pain  Gastrointestinal: Negative for abdominal pain and nausea  Genitourinary: Negative for dysuria  Musculoskeletal: Positive for arthralgias  Skin: Negative for rash and wound  Neurological: Negative for weakness and headaches  Objective:  Ortho Exam    Physical Exam  Constitutional:       Appearance: Normal appearance  He is normal weight  Eyes:      Extraocular Movements: Extraocular movements intact  Pulmonary:      Effort: Pulmonary effort is normal    Musculoskeletal:         General: No tenderness  Cervical back: Normal range of motion  Comments: R foot- no TTP  No calf TTP   Skin:     General: Skin is warm and dry  Neurological:      General: No focal deficit present  Mental Status: He is alert and oriented to person, place, and time  Mental status is at baseline  Psychiatric:         Mood and Affect: Mood normal          Behavior: Behavior normal          Thought Content:  Thought content normal  Judgment: Judgment normal          I have personally reviewed pertinent films in PACS and my interpretation is XR-  R foot - prox 5th metatarsal fx with slight widening, some callus formation  Assessment/Plan:  Assessment/Plan   Diagnoses and all orders for this visit:    Fracture of base of fifth metatarsal bone of right foot with routine healing, subsequent encounter  -     XR foot 3+ vw right; Future  -     Cam Boot        Return in about 2 weeks (around 1/6/2023) for Recheck       Kendrick Santillan MD

## 2022-12-23 NOTE — LETTER
December 23, 2022     Patient: Leon Montoya  YOB: 1965  Date of Visit: 12/23/2022      To Whom it May Concern:    Leon Montoya is under my professional care  Erick Brock was seen in my office on 12/23/2022  Erick Brock may return to work with limitations -  sedentary duty only  He must wear boot and crutches at all times  No driving  If you have any questions or concerns, please don't hesitate to call           Sincerely,          Miles Aguilar MD        CC: Leon Montoya

## 2022-12-26 DIAGNOSIS — E78.5 DYSLIPIDEMIA: ICD-10-CM

## 2022-12-26 DIAGNOSIS — F32.A DEPRESSION, UNSPECIFIED DEPRESSION TYPE: ICD-10-CM

## 2022-12-27 RX ORDER — VENLAFAXINE HYDROCHLORIDE 150 MG/1
150 CAPSULE, EXTENDED RELEASE ORAL DAILY
Qty: 90 CAPSULE | Refills: 1 | Status: SHIPPED | OUTPATIENT
Start: 2022-12-27

## 2022-12-27 RX ORDER — BUPROPION HYDROCHLORIDE 150 MG/1
150 TABLET ORAL DAILY
Qty: 90 TABLET | Refills: 1 | Status: SHIPPED | OUTPATIENT
Start: 2022-12-27

## 2022-12-27 RX ORDER — ATORVASTATIN CALCIUM 20 MG/1
20 TABLET, FILM COATED ORAL DAILY
Qty: 90 TABLET | Refills: 1 | Status: SHIPPED | OUTPATIENT
Start: 2022-12-27

## 2022-12-29 ENCOUNTER — TELEPHONE (OUTPATIENT)
Dept: OBGYN CLINIC | Facility: CLINIC | Age: 57
End: 2022-12-29

## 2022-12-29 NOTE — TELEPHONE ENCOUNTER
LMOAM to call and schedule an appt with Dr Catracho Stephens (podiatrist) as per Dr Yobany Ortega request

## 2023-01-04 ENCOUNTER — OFFICE VISIT (OUTPATIENT)
Dept: PODIATRY | Facility: CLINIC | Age: 58
End: 2023-01-04

## 2023-01-04 ENCOUNTER — PREP FOR PROCEDURE (OUTPATIENT)
Dept: OBGYN CLINIC | Facility: CLINIC | Age: 58
End: 2023-01-04

## 2023-01-04 VITALS — HEIGHT: 71 IN | BODY MASS INDEX: 36.4 KG/M2 | WEIGHT: 260 LBS

## 2023-01-04 DIAGNOSIS — S99.191G: ICD-10-CM

## 2023-01-04 DIAGNOSIS — M79.671 PAIN IN RIGHT FOOT: Primary | ICD-10-CM

## 2023-01-04 RX ORDER — CHLORHEXIDINE GLUCONATE 4 G/100ML
SOLUTION TOPICAL DAILY PRN
OUTPATIENT
Start: 2023-01-04

## 2023-01-04 RX ORDER — CHLORHEXIDINE GLUCONATE 0.12 MG/ML
15 RINSE ORAL ONCE
OUTPATIENT
Start: 2023-01-04 | End: 2023-01-04

## 2023-01-10 ENCOUNTER — TELEPHONE (OUTPATIENT)
Dept: OBGYN CLINIC | Facility: HOSPITAL | Age: 58
End: 2023-01-10

## 2023-01-10 NOTE — TELEPHONE ENCOUNTER
Caller: RAHEEL    Doctor/Office:      Call regarding :  Surgery     Call was transferred to: Podiatry

## 2023-01-12 ENCOUNTER — TELEPHONE (OUTPATIENT)
Dept: OBGYN CLINIC | Facility: CLINIC | Age: 58
End: 2023-01-12

## 2023-01-12 NOTE — TELEPHONE ENCOUNTER
Caller: Sol Lopez from [address]: Keith An    Reason for call: [facility] called to provide info:    Varinder Hickey   [phone]  Fax [phone]    Call back#:

## 2023-01-13 ENCOUNTER — CONSULT (OUTPATIENT)
Dept: INTERNAL MEDICINE CLINIC | Facility: OTHER | Age: 58
End: 2023-01-13

## 2023-01-13 VITALS
OXYGEN SATURATION: 98 % | HEART RATE: 90 BPM | WEIGHT: 245 LBS | DIASTOLIC BLOOD PRESSURE: 82 MMHG | HEIGHT: 71 IN | BODY MASS INDEX: 34.3 KG/M2 | SYSTOLIC BLOOD PRESSURE: 132 MMHG | TEMPERATURE: 98 F

## 2023-01-13 DIAGNOSIS — H91.93 DECREASED HEARING OF BOTH EARS: ICD-10-CM

## 2023-01-13 DIAGNOSIS — Z01.818 PREOP EXAMINATION: ICD-10-CM

## 2023-01-13 DIAGNOSIS — R73.03 PREDIABETES: ICD-10-CM

## 2023-01-13 DIAGNOSIS — E78.5 DYSLIPIDEMIA: ICD-10-CM

## 2023-01-13 DIAGNOSIS — I10 HYPERTENSION, UNSPECIFIED TYPE: ICD-10-CM

## 2023-01-13 DIAGNOSIS — R06.83 SNORING: ICD-10-CM

## 2023-01-13 DIAGNOSIS — F32.A DEPRESSION, UNSPECIFIED DEPRESSION TYPE: ICD-10-CM

## 2023-01-13 DIAGNOSIS — S92.354S CLOSED NONDISPLACED FRACTURE OF FIFTH METATARSAL BONE OF RIGHT FOOT, SEQUELA: ICD-10-CM

## 2023-01-13 DIAGNOSIS — Z01.818 PRE-OP EXAM: Primary | ICD-10-CM

## 2023-01-13 NOTE — PRE-PROCEDURE INSTRUCTIONS
Pre-Surgery Instructions:   Medication Instructions   • aspirin 81 MG tablet Stop taking 7 days prior to surgery  • atorvastatin (LIPITOR) 20 mg tablet Take day of surgery  • buPROPion (WELLBUTRIN XL) 150 mg 24 hr tablet Take day of surgery  • co-enzyme Q-10 30 MG capsule Stop taking 7 days prior to surgery  • Green Tea 150 MG CAPS Stop taking 7 days prior to surgery  • Multiple Vitamins-Minerals (MULTIVITAMIN ADULT) TABS Stop taking 7 days prior to surgery  • Turmeric Curcumin 500 MG CAPS Stop taking 7 days prior to surgery  • venlafaxine (EFFEXOR-XR) 150 mg 24 hr capsule Take day of surgery  Pt denies fever, sob, sore throat and cough  Pt verbalized understanding of shower and med instructions  Pt instructed to stop nsaids and supplements one week prior to surgery

## 2023-01-13 NOTE — PATIENT INSTRUCTIONS
Preop examination  EKG today in office  Patient is low risk for intermediate risk procedure pending lab results  [facility]  Advised him to hold fish oil starting today  Can Resume 1 week after surgery  Patient notes his surgeon has already made him aware he is to hold aspirin and will follow these recommendations as well  Continue to follow with podiatry following procedure  Report back if any new symptoms develop prior to procedure  EKG today in office unremarkable  No acute change from EKG in 2020  Negative stress echo in 2015  Hypertension  Slight elevation today, not currently on medications  Being managed with diet and lifestyle modifications alone  Dyslipidemia  Stable at this time on fish oil and statin  Discussed fish oil should be stopped 7 days prior to the procedure  Depression  Stable on Wellbutrin and Effexor  No dose change  We will continue to follow    Snoring  Patient has home CPAP scheduled  He we will continue to follow    Decreased hearing of both ears  Chronic unchanged  Patient follows with audiology and will be getting hearing aids

## 2023-01-13 NOTE — ASSESSMENT & PLAN NOTE
EKG today in office  Patient is low risk for intermediate risk procedure  Labs reviewed  Advised him to hold fish oil starting today  Can Resume 1 week after surgery  Patient notes his surgeon has already made him aware he is to hold aspirin and will follow these recommendations as well  Continue to follow with podiatry following procedure  Report back if any new symptoms develop prior to procedure  EKG today in office unremarkable  No acute change from EKG in 2020  Negative stress echo in 2015

## 2023-01-13 NOTE — H&P (VIEW-ONLY)
[facility] [address] [place] [address]  Standard Office Visit  Patient ID: Trung Mina    : 1965  Age/Gender: 56 y o  male     DATE: 2023      Assessment/Plan:    Preop examination  EKG today in office  Patient is low risk for intermediate risk procedure  Labs reviewed  Advised him to hold fish oil starting today  Can Resume 1 week after surgery  Patient notes his surgeon has already made him aware he is to hold aspirin and will follow these recommendations as well  Continue to follow with podiatry following procedure  Report back if any new symptoms develop prior to procedure  EKG today in office unremarkable  No acute change from EKG in   Negative stress echo in   Hypertension  Slight elevation today, not currently on medications  Being managed with diet and lifestyle modifications alone  Dyslipidemia  Stable at this time on fish oil and statin  Discussed fish oil should be stopped 7 days prior to the procedure  Depression  Stable on Wellbutrin and Effexor  No dose change  We will continue to follow    Snoring  Patient has home CPAP scheduled  He we will continue to follow    Decreased hearing of both ears  Chronic unchanged  Patient follows with audiology and will be getting hearing aids  Prediabetes  A1c 6 0 in 2022  Continue to monitor  Fasting glucose elevated on preop labs, remainder unremarkable  Diagnoses and all orders for this visit:    Pre-op exam  -     POCT ECG    Preop examination    Hypertension, unspecified type    Dyslipidemia    Depression, unspecified depression type    Snoring    Decreased hearing of both ears    Closed nondisplaced fracture of fifth metatarsal bone of right foot, sequela    Prediabetes          BMI Counseling: Body mass index is 34 17 kg/m²   The BMI is above normal  Nutrition recommendations include limiting drinks that contain sugar, moderation in carbohydrate intake, increasing intake of lean protein and reducing intake of cholesterol  Rationale for BMI follow-up plan is due to patient being overweight or obese  Subjective:   Chief Complaint   Patient presents with   • Pre-op Exam      - Metatarsal facture right foot  Surgery on 1/20/22, with dr obando at [facility]         Kelsy June is a 56 y o  male who presents to the office on 1/13/2023 for       61-year-old male with a history of hypertension, dyslipidemia, anxiety with depression, right fifth metatarsal fracture, prediabetes presents to the office for preop evaluation  Surgery scheduled 1/20 for R foot surgery with Dr Alecia Tamez at [place] [address]  Patient is unsure of anesthesia type  No prior surgery other than wisdom teeth removal and plastic surgery for his lip (scar repair) as a child  No adverse reaction to anesthesia  No prior difficulty with anesthesia in the past   No known drug allergies  No dentures  No prior neck injury  No h/o bleeding disorders  No hepatitis  No blood in stool, no blood in urine  No abnormal bruising or bleeding  He notes he was using exercise bike prior to his injury  No CP or palpitations with activity  Walking on crutches  He notes he uses the crutches to go up and down stairs  No limitations  No CP or palpitations with using stairs  Prior to injury no difficulty with 2 flights of stairs or walking 4 blocks  EKG today, prior EKG in 2020, stress echo negative 2015  No recent med changes  Recently saw audiology and will be set up in the future with hearing aides  He did schedule with derm and had bx done as well as full skin survey  He was told results are benign  Has take home sleep study scheduled  Recently moved, new home, things are going well  Injured his R foot 5th metatarsal on 1/2/23 while at work, tripped over computer speakers  Suffered fracture  First seen by ortho and later saw podiatry surgery  Not taking BP meds, stable on diet and lifestyle  Patient in on ASA and fish oil  He has already discussed d/c these meds with his surgeon prior to the surgery  Patient reports anxiety and depressive symptoms are well controlled at this time on medications  Ankle Injury     Hypertension  This is a chronic problem  Pertinent negatives include no chest pain, palpitations or shortness of breath  The following portions of the patient's history were reviewed and updated as appropriate: allergies, current medications, past family history, past medical history, past social history, past surgical history and problem list     Review of Systems   Constitutional: Negative for chills, fever and unexpected weight change  HENT: Positive for hearing loss (Will be getting hearing aids from audiology)  Negative for ear discharge, ear pain, rhinorrhea, sinus pressure and sore throat  Eyes: Negative for visual disturbance  Respiratory: Negative for cough, shortness of breath and wheezing  Scheduled for sleep study   Cardiovascular: Negative for chest pain and palpitations  No exercise intolerance  Gastrointestinal: Negative for abdominal pain, blood in stool, constipation, diarrhea, nausea and vomiting  Genitourinary: Negative for dysuria, frequency, hematuria and penile discharge  Musculoskeletal: Negative for arthralgias and back pain  R foot pain well controlled, notes he only gets pain if there is any pressure on the foot  Skin: Negative for color change and rash  Neurological: Negative for dizziness, seizures, syncope, weakness and light-headedness  Psychiatric/Behavioral:        Anxiety and depression has been "ok, good "  Work is supportive  Injury did occur at work  Konrad Saldana is following with case  All other systems reviewed and are negative          Patient Active Problem List   Diagnosis   • Depression   • Dyslipidemia   • Obesity (BMI 35 0-39 9 without comorbidity)   • Snoring   • Decreased hearing of both ears   • Abnormal liver ultrasound   • Screening for malignant neoplasm of colon   • Needs flu shot   • Skin lesion of face   • Prediabetes   • Vaccine counseling   • Hypertension   • Closed nondisplaced fracture of fifth metatarsal bone   • Preop examination       Past Medical History:   Diagnosis Date   • Acute suppurative otitis media of left ear without spontaneous rupture of tympanic membrane 01/22/2018   • Anxiety    • Depression    • HL (hearing loss)    • Hyperlipidemia    • Hypertension    • Obesity (BMI 35 0-39 9 without comorbidity) 01/16/2017       Past Surgical History:   Procedure Laterality Date   • WISDOM TOOTH EXTRACTION           Current Outpatient Medications:   •  aspirin 81 MG tablet, Take 1 tablet by mouth daily, Disp: , Rfl:   •  atorvastatin (LIPITOR) 20 mg tablet, Take 1 tablet (20 mg total) by mouth daily, Disp: 90 tablet, Rfl: 1  •  buPROPion (WELLBUTRIN XL) 150 mg 24 hr tablet, Take 1 tablet (150 mg total) by mouth daily, Disp: 90 tablet, Rfl: 1  •  co-enzyme Q-10 30 MG capsule, Take 30 mg by mouth daily, Disp: , Rfl:   •  Green Tea 150 MG CAPS, Take by mouth daily, Disp: , Rfl:   •  Multiple Vitamins-Minerals (MULTIVITAMIN ADULT) TABS, Take 1 tablet by mouth daily, Disp: , Rfl:   •  Omega-3 Fatty Acids ([employer] FISH OIL) 1000 MG CAPS, Take 2 capsules by mouth 2 (two) times a day, Disp: , Rfl:   •  Turmeric Curcumin 500 MG CAPS, Take 1 capsule by mouth daily, Disp: , Rfl:   •  venlafaxine (EFFEXOR-XR) 150 mg 24 hr capsule, Take 1 capsule (150 mg total) by mouth daily, Disp: 90 capsule, Rfl: 1    No Known Allergies    Social History     Socioeconomic History   • Marital status:      Spouse name: None   • Number of children: None   • Years of education: None   • Highest education level: None   Occupational History   • None   Tobacco Use   • Smoking status: Former     Packs/day: 0 50     Years: 25 00     Pack years: 12 50     Types: Cigarettes     Start date: 200     Quit date: 2015     Years since quittin 1   • Smokeless tobacco: Never   Vaping Use   • Vaping Use: Never used   Substance and Sexual Activity   • Alcohol use:  Yes     Alcohol/week: 4 0 standard drinks     Types: 4 Cans of beer per week   • Drug use: Never   • Sexual activity: Yes   Other Topics Concern   • None   Social History Narrative   • None     Social Determinants of Health     Financial Resource Strain: Not on file   Food Insecurity: Not on file   Transportation Needs: Not on file   Physical Activity: Not on file   Stress: Not on file   Social Connections: Not on file   Intimate Partner Violence: Not on file   Housing Stability: Not on file       Family History   Problem Relation Age of Onset   • Diabetes Father         borderline   • Hypothyroidism Father    • Prostate cancer Father    • Heart disease Father    • Skin cancer Father    • Dementia Father    • Hypothyroidism Sister    • Fibromyalgia Sister    • Heart disease Sister    • Ovarian cancer Mother    • Skin cancer Mother        PHQ-2/9 Depression Screening           Health Maintenance   Topic Date Due   • Hepatitis C Screening  Never done   • HIV Screening  Never done   • Annual Physical  Never done   • DTaP,Tdap,and Td Vaccines (1 - Tdap) Never done   • COVID-19 Vaccine (3 - Booster for Moderna series) 2021   • Colorectal Cancer Screening  2023   • BMI: Followup Plan  2024   • BMI: Adult  2024   • Influenza Vaccine  Completed   • Pneumococcal Vaccine: Pediatrics (0 to 5 Years) and At-Risk Patients (6 to 59 Years)  Aged Out   • HIB Vaccine  Aged Out   • IPV Vaccine  Aged Out   • Hepatitis A Vaccine  Aged Out   • Meningococcal ACWY Vaccine  Aged Out   • HPV Vaccine  Aged [employer] History   Administered Date(s) Administered   • COVID-19 MODERNA VACC 0 5 ML IM 2021, 2021   • H1N1, All Formulations 2010   • INFLUENZA 2015, 2016, 2022   • Influenza Quadrivalent Preservative Free 3 years and older IM 12/07/2015   • Influenza Quadrivalent, 6-35 Months IM 11/28/2016   • Influenza, recombinant, quadrivalent,injectable, preservative free 12/21/2018, 10/11/2019, 12/04/2020, 10/15/2021, 09/30/2022   • Zoster Vaccine Recombinant 12/04/2020, 09/30/2022, 09/30/2022        Objective:  Vitals:    01/13/23 1201 01/13/23 1227   BP: 140/80 132/82   BP Location: Left arm Left arm   Patient Position: Sitting Sitting   Cuff Size: Large Extra-Large   Pulse: 90    Temp: 98 °F (36 7 °C)    TempSrc: Temporal    SpO2: 98%    Weight: 111 kg (245 lb)    Height: 5' 11" (1 803 m)      Wt Readings from Last 3 Encounters:   01/13/23 111 kg (245 lb)   01/04/23 118 kg (260 lb)   12/13/22 118 kg (260 lb)     Body mass index is 34 17 kg/m²  No results found  Physical Exam  Vitals and nursing note reviewed  Constitutional:       General: He is not in acute distress  Appearance: Normal appearance  He is well-developed  He is not diaphoretic  Comments: Alert pleasant cooperative  Seated in room in no acute distress   HENT:      Head: Normocephalic and atraumatic  Right Ear: Tympanic membrane and ear canal normal       Left Ear: Tympanic membrane and ear canal normal       Mouth/Throat:      Mouth: Mucous membranes are moist       Pharynx: No oropharyngeal exudate or posterior oropharyngeal erythema  Eyes:      General: No scleral icterus  Right eye: No discharge  Left eye: No discharge  Pupils: Pupils are equal, round, and reactive to light  Cardiovascular:      Rate and Rhythm: Normal rate and regular rhythm  Heart sounds: Normal heart sounds  No murmur heard  Pulmonary:      Effort: Pulmonary effort is normal  No respiratory distress  Breath sounds: Normal breath sounds  No wheezing or rales  Abdominal:      General: Bowel sounds are normal  There is no distension  Palpations: Abdomen is soft  Tenderness: There is no abdominal tenderness   There is no guarding  Musculoskeletal:      Cervical back: Neck supple  Right lower leg: No edema  Left lower leg: No edema  Right foot: Normal capillary refill  Swelling and bony tenderness present  Normal pulse  Legs:    Lymphadenopathy:      Cervical: No cervical adenopathy  Skin:     General: Skin is warm and dry  Neurological:      General: No focal deficit present  Mental Status: He is alert  Psychiatric:         Mood and Affect: Mood normal          Behavior: Behavior normal          Thought Content: Thought content normal              Future Appointments   Date Time Provider Jaqueline Taylor   1/27/2023  9:15 AM JOLEEN KrishnaM [facility]   2/3/2023 12:30 PM Yesica Martines PA-C [facility]   2/14/2023 11:30 AM [address] [place] [address] [address] [place] [address] SLEEP [address] [place] [address] SLEEP   3/3/2023  7:00 AM Yesica Martines PA-C [facility]   12/12/2023  4:00 PM Garo Goodman [place] [facility]   12/12/2023  4:00 PM Scott Cross [facility]  [facility]  [address] [place] [address]    Patient Care Team:  Yesica Martines PA-C as PCP - General (Internal Medicine)    This note was completed in part utilizing M-Modal Fluency Direct Software  Grammatical errors, random word insertions, spelling mistakes, and incomplete sentences can be an occasional consequence of this system secondary to software limitations, ambient noise, and hardware issues  If you have any questions or concerns about the content, text, or information contained within the body of this dictation, please contact the provider for clarification

## 2023-01-13 NOTE — ASSESSMENT & PLAN NOTE
Slight elevation today, not currently on medications  Being managed with diet and lifestyle modifications alone

## 2023-01-13 NOTE — PROGRESS NOTES
[facility] [address] [place] [address]  Standard Office Visit  Patient ID: Francis Blackburn    : 1965  Age/Gender: 56 y o  male     DATE: 2023      Assessment/Plan:    Preop examination  EKG today in office  Patient is low risk for intermediate risk procedure  Labs reviewed  Advised him to hold fish oil starting today  Can Resume 1 week after surgery  Patient notes his surgeon has already made him aware he is to hold aspirin and will follow these recommendations as well  Continue to follow with podiatry following procedure  Report back if any new symptoms develop prior to procedure  EKG today in office unremarkable  No acute change from EKG in   Negative stress echo in   Hypertension  Slight elevation today, not currently on medications  Being managed with diet and lifestyle modifications alone  Dyslipidemia  Stable at this time on fish oil and statin  Discussed fish oil should be stopped 7 days prior to the procedure  Depression  Stable on Wellbutrin and Effexor  No dose change  We will continue to follow    Snoring  Patient has home CPAP scheduled  He we will continue to follow    Decreased hearing of both ears  Chronic unchanged  Patient follows with audiology and will be getting hearing aids  Prediabetes  A1c 6 0 in 2022  Continue to monitor  Fasting glucose elevated on preop labs, remainder unremarkable  Diagnoses and all orders for this visit:    Pre-op exam  -     POCT ECG    Preop examination    Hypertension, unspecified type    Dyslipidemia    Depression, unspecified depression type    Snoring    Decreased hearing of both ears    Closed nondisplaced fracture of fifth metatarsal bone of right foot, sequela    Prediabetes          BMI Counseling: Body mass index is 34 17 kg/m²   The BMI is above normal  Nutrition recommendations include limiting drinks that contain sugar, moderation in carbohydrate intake, increasing intake of lean protein and reducing intake of cholesterol  Rationale for BMI follow-up plan is due to patient being overweight or obese  Subjective:   Chief Complaint   Patient presents with   • Pre-op Exam      - Metatarsal facture right foot  Surgery on 1/20/22, with dr obando at [facility] ED         Marjorie Acuna is a 56 y o  male who presents to the office on 1/13/2023 for       53-year-old male with a history of hypertension, dyslipidemia, anxiety with depression, right fifth metatarsal fracture, prediabetes presents to the office for preop evaluation  Surgery scheduled 1/20 for R foot surgery with Dr Rei Bear at [place] [address]  Patient is unsure of anesthesia type  No prior surgery other than wisdom teeth removal and plastic surgery for his lip (scar repair) as a child  No adverse reaction to anesthesia  No prior difficulty with anesthesia in the past   No known drug allergies  No dentures  No prior neck injury  No h/o bleeding disorders  No hepatitis  No blood in stool, no blood in urine  No abnormal bruising or bleeding  He notes he was using exercise bike prior to his injury  No CP or palpitations with activity  Walking on crutches  He notes he uses the crutches to go up and down stairs  No limitations  No CP or palpitations with using stairs  Prior to injury no difficulty with 2 flights of stairs or walking 4 blocks  EKG today, prior EKG in 2020, stress echo negative 2015  No recent med changes  Recently saw audiology and will be set up in the future with hearing aides  He did schedule with derm and had bx done as well as full skin survey  He was told results are benign  Has take home sleep study scheduled  Recently moved, new home, things are going well  Injured his R foot 5th metatarsal on 1/2/23 while at work, tripped over computer speakers  Suffered fracture  First seen by ortho and later saw podiatry surgery  Not taking BP meds, stable on diet and lifestyle  Patient in on ASA and fish oil  He has already discussed d/c these meds with his surgeon prior to the surgery  Patient reports anxiety and depressive symptoms are well controlled at this time on medications  Ankle Injury     Hypertension  This is a chronic problem  Pertinent negatives include no chest pain, palpitations or shortness of breath  The following portions of the patient's history were reviewed and updated as appropriate: allergies, current medications, past family history, past medical history, past social history, past surgical history and problem list     Review of Systems   Constitutional: Negative for chills, fever and unexpected weight change  HENT: Positive for hearing loss (Will be getting hearing aids from audiology)  Negative for ear discharge, ear pain, rhinorrhea, sinus pressure and sore throat  Eyes: Negative for visual disturbance  Respiratory: Negative for cough, shortness of breath and wheezing  Scheduled for sleep study   Cardiovascular: Negative for chest pain and palpitations  No exercise intolerance  Gastrointestinal: Negative for abdominal pain, blood in stool, constipation, diarrhea, nausea and vomiting  Genitourinary: Negative for dysuria, frequency, hematuria and penile discharge  Musculoskeletal: Negative for arthralgias and back pain  R foot pain well controlled, notes he only gets pain if there is any pressure on the foot  Skin: Negative for color change and rash  Neurological: Negative for dizziness, seizures, syncope, weakness and light-headedness  Psychiatric/Behavioral:        Anxiety and depression has been "ok, good "  Work is supportive  Injury did occur at work  Jose Herrera is following with case  All other systems reviewed and are negative          Patient Active Problem List   Diagnosis   • Depression   • Dyslipidemia   • Obesity (BMI 35 0-39 9 without comorbidity)   • Snoring   • Decreased hearing of both ears   • Abnormal liver ultrasound   • Screening for malignant neoplasm of colon   • Needs flu shot   • Skin lesion of face   • Prediabetes   • Vaccine counseling   • Hypertension   • Closed nondisplaced fracture of fifth metatarsal bone   • Preop examination       Past Medical History:   Diagnosis Date   • Acute suppurative otitis media of left ear without spontaneous rupture of tympanic membrane 01/22/2018   • Anxiety    • Depression    • HL (hearing loss)    • Hyperlipidemia    • Hypertension    • Obesity (BMI 35 0-39 9 without comorbidity) 01/16/2017       Past Surgical History:   Procedure Laterality Date   • WISDOM TOOTH EXTRACTION           Current Outpatient Medications:   •  aspirin 81 MG tablet, Take 1 tablet by mouth daily, Disp: , Rfl:   •  atorvastatin (LIPITOR) 20 mg tablet, Take 1 tablet (20 mg total) by mouth daily, Disp: 90 tablet, Rfl: 1  •  buPROPion (WELLBUTRIN XL) 150 mg 24 hr tablet, Take 1 tablet (150 mg total) by mouth daily, Disp: 90 tablet, Rfl: 1  •  co-enzyme Q-10 30 MG capsule, Take 30 mg by mouth daily, Disp: , Rfl:   •  Green Tea 150 MG CAPS, Take by mouth daily, Disp: , Rfl:   •  Multiple Vitamins-Minerals (MULTIVITAMIN ADULT) TABS, Take 1 tablet by mouth daily, Disp: , Rfl:   •  Omega-3 Fatty Acids ([employer] FISH OIL) 1000 MG CAPS, Take 2 capsules by mouth 2 (two) times a day, Disp: , Rfl:   •  Turmeric Curcumin 500 MG CAPS, Take 1 capsule by mouth daily, Disp: , Rfl:   •  venlafaxine (EFFEXOR-XR) 150 mg 24 hr capsule, Take 1 capsule (150 mg total) by mouth daily, Disp: 90 capsule, Rfl: 1    No Known Allergies    Social History     Socioeconomic History   • Marital status:      Spouse name: None   • Number of children: None   • Years of education: None   • Highest education level: None   Occupational History   • None   Tobacco Use   • Smoking status: Former     Packs/day: 0 50     Years: 25 00     Pack years: 12 50     Types: Cigarettes     Start date: 200     Quit date: 2015     Years since quittin 1   • Smokeless tobacco: Never   Vaping Use   • Vaping Use: Never used   Substance and Sexual Activity   • Alcohol use:  Yes     Alcohol/week: 4 0 standard drinks     Types: 4 Cans of beer per week   • Drug use: Never   • Sexual activity: Yes   Other Topics Concern   • None   Social History Narrative   • None     Social Determinants of Health     Financial Resource Strain: Not on file   Food Insecurity: Not on file   Transportation Needs: Not on file   Physical Activity: Not on file   Stress: Not on file   Social Connections: Not on file   Intimate Partner Violence: Not on file   Housing Stability: Not on file       Family History   Problem Relation Age of Onset   • Diabetes Father         borderline   • Hypothyroidism Father    • Prostate cancer Father    • Heart disease Father    • Skin cancer Father    • Dementia Father    • Hypothyroidism Sister    • Fibromyalgia Sister    • Heart disease Sister    • Ovarian cancer Mother    • Skin cancer Mother        PHQ-2/9 Depression Screening           Health Maintenance   Topic Date Due   • Hepatitis C Screening  Never done   • HIV Screening  Never done   • Annual Physical  Never done   • DTaP,Tdap,and Td Vaccines (1 - Tdap) Never done   • COVID-19 Vaccine (3 - Booster for Moderna series) 2021   • Colorectal Cancer Screening  2023   • BMI: Followup Plan  2024   • BMI: Adult  2024   • Influenza Vaccine  Completed   • Pneumococcal Vaccine: Pediatrics (0 to 5 Years) and At-Risk Patients (6 to 59 Years)  Aged Out   • HIB Vaccine  Aged Out   • IPV Vaccine  Aged Out   • Hepatitis A Vaccine  Aged Out   • Meningococcal ACWY Vaccine  Aged Out   • HPV Vaccine  Aged [employer] History   Administered Date(s) Administered   • COVID-19 MODERNA VACC 0 5 ML IM 2021, 2021   • H1N1, All Formulations 2010   • INFLUENZA 2015, 2016, 2022   • Influenza Quadrivalent Preservative Free 3 years and older IM 12/07/2015   • Influenza Quadrivalent, 6-35 Months IM 11/28/2016   • Influenza, recombinant, quadrivalent,injectable, preservative free 12/21/2018, 10/11/2019, 12/04/2020, 10/15/2021, 09/30/2022   • Zoster Vaccine Recombinant 12/04/2020, 09/30/2022, 09/30/2022        Objective:  Vitals:    01/13/23 1201 01/13/23 1227   BP: 140/80 132/82   BP Location: Left arm Left arm   Patient Position: Sitting Sitting   Cuff Size: Large Extra-Large   Pulse: 90    Temp: 98 °F (36 7 °C)    TempSrc: Temporal    SpO2: 98%    Weight: 111 kg (245 lb)    Height: 5' 11" (1 803 m)      Wt Readings from Last 3 Encounters:   01/13/23 111 kg (245 lb)   01/04/23 118 kg (260 lb)   12/13/22 118 kg (260 lb)     Body mass index is 34 17 kg/m²  No results found  Physical Exam  Vitals and nursing note reviewed  Constitutional:       General: He is not in acute distress  Appearance: Normal appearance  He is well-developed  He is not diaphoretic  Comments: Alert pleasant cooperative  Seated in room in no acute distress   HENT:      Head: Normocephalic and atraumatic  Right Ear: Tympanic membrane and ear canal normal       Left Ear: Tympanic membrane and ear canal normal       Mouth/Throat:      Mouth: Mucous membranes are moist       Pharynx: No oropharyngeal exudate or posterior oropharyngeal erythema  Eyes:      General: No scleral icterus  Right eye: No discharge  Left eye: No discharge  Pupils: Pupils are equal, round, and reactive to light  Cardiovascular:      Rate and Rhythm: Normal rate and regular rhythm  Heart sounds: Normal heart sounds  No murmur heard  Pulmonary:      Effort: Pulmonary effort is normal  No respiratory distress  Breath sounds: Normal breath sounds  No wheezing or rales  Abdominal:      General: Bowel sounds are normal  There is no distension  Palpations: Abdomen is soft  Tenderness: There is no abdominal tenderness   There is no guarding  Musculoskeletal:      Cervical back: Neck supple  Right lower leg: No edema  Left lower leg: No edema  Right foot: Normal capillary refill  Swelling and bony tenderness present  Normal pulse  Legs:    Lymphadenopathy:      Cervical: No cervical adenopathy  Skin:     General: Skin is warm and dry  Neurological:      General: No focal deficit present  Mental Status: He is alert  Psychiatric:         Mood and Affect: Mood normal          Behavior: Behavior normal          Thought Content: Thought content normal              Future Appointments   Date Time Provider Jaqueline Taylor   1/27/2023  9:15 AM Julee Mendoza DPM [facility]   2/3/2023 12:30 PM Isael Borrego PA-C [facility]   2/14/2023 11:30 AM [address] [place] [address] SLEEP [facility] SLEEP   3/3/2023  7:00 AM Isael Borrego PA-C [facility]   12/12/2023  4:00 PM Roger Wilkerson ORL [facility]   12/12/2023  4:00 PM Christ[facility] Dunn [facility]  [address] [place] [address]    Patient Care Team:  Isael Borrego PA-C as PCP - General (Internal Medicine)    This note was completed in part utilizing [employer] Fluency Direct Software  Grammatical errors, random word insertions, spelling mistakes, and incomplete sentences can be an occasional consequence of this system secondary to software limitations, ambient noise, and hardware issues  If you have any questions or concerns about the content, text, or information contained within the body of this dictation, please contact the provider for clarification

## 2023-01-13 NOTE — ASSESSMENT & PLAN NOTE
Stable at this time on fish oil and statin  Discussed fish oil should be stopped 7 days prior to the procedure

## 2023-01-13 NOTE — ASSESSMENT & PLAN NOTE
A1c 6 0 in 9/2022  Continue to monitor  Fasting glucose elevated on preop labs, remainder unremarkable

## 2023-01-17 ENCOUNTER — TELEPHONE (OUTPATIENT)
Dept: PODIATRY | Facility: CLINIC | Age: 58
End: 2023-01-17

## 2023-01-17 NOTE — TELEPHONE ENCOUNTER
Caller: Shyam Pizarro    Doctor: Shon Tan    Reason for call: Needs patient office notes from the 1/13 faxed      Call back#: fax [phone]

## 2023-01-20 ENCOUNTER — APPOINTMENT (OUTPATIENT)
Dept: RADIOLOGY | Facility: HOSPITAL | Age: 58
End: 2023-01-20

## 2023-01-20 ENCOUNTER — HOSPITAL ENCOUNTER (OUTPATIENT)
Facility: HOSPITAL | Age: 58
Setting detail: OUTPATIENT SURGERY
Discharge: HOME | End: 2023-01-20
Attending: PODIATRIST | Admitting: PODIATRIST

## 2023-01-20 ENCOUNTER — ANESTHESIA (OUTPATIENT)
Dept: PERIOP | Facility: HOSPITAL | Age: 58
End: 2023-01-20

## 2023-01-20 ENCOUNTER — ANESTHESIA EVENT (OUTPATIENT)
Dept: PERIOP | Facility: HOSPITAL | Age: 58
End: 2023-01-20

## 2023-01-20 ENCOUNTER — HOSPITAL ENCOUNTER (OUTPATIENT)
Dept: RADIOLOGY | Facility: HOSPITAL | Age: 58
Setting detail: OUTPATIENT SURGERY
Discharge: HOME | End: 2023-01-20

## 2023-01-20 VITALS
TEMPERATURE: 97.6 F | BODY MASS INDEX: 34.3 KG/M2 | WEIGHT: 245 LBS | SYSTOLIC BLOOD PRESSURE: 163 MMHG | HEIGHT: 71 IN | HEART RATE: 66 BPM | OXYGEN SATURATION: 99 % | RESPIRATION RATE: 16 BRPM | DIASTOLIC BLOOD PRESSURE: 88 MMHG

## 2023-01-20 DIAGNOSIS — Z98.890 POST-OPERATIVE STATE: Primary | ICD-10-CM

## 2023-01-20 DIAGNOSIS — G89.18 ACUTE POST-OPERATIVE PAIN: ICD-10-CM

## 2023-01-20 DEVICE — SOLID SCREW JFX
Type: IMPLANTABLE DEVICE | Site: FOOT | Status: FUNCTIONAL
Brand: ASNIS

## 2023-01-20 RX ORDER — CHLORHEXIDINE GLUCONATE 0.12 MG/ML
15 RINSE ORAL ONCE
Status: COMPLETED | OUTPATIENT
Start: 2023-01-20 | End: 2023-01-20

## 2023-01-20 RX ORDER — ONDANSETRON 2 MG/ML
4 INJECTION INTRAMUSCULAR; INTRAVENOUS ONCE AS NEEDED
Status: DISCONTINUED | OUTPATIENT
Start: 2023-01-20 | End: 2023-01-20 | Stop reason: HOSPADM

## 2023-01-20 RX ORDER — SODIUM CHLORIDE, SODIUM LACTATE, POTASSIUM CHLORIDE, CALCIUM CHLORIDE 600; 310; 30; 20 MG/100ML; MG/100ML; MG/100ML; MG/100ML
125 INJECTION, SOLUTION INTRAVENOUS CONTINUOUS
Status: DISCONTINUED | OUTPATIENT
Start: 2023-01-20 | End: 2023-01-20

## 2023-01-20 RX ORDER — OXYCODONE HYDROCHLORIDE 5 MG/1
5 TABLET ORAL EVERY 4 HOURS PRN
Status: DISCONTINUED | OUTPATIENT
Start: 2023-01-20 | End: 2023-01-20 | Stop reason: HOSPADM

## 2023-01-20 RX ORDER — FENTANYL CITRATE 50 UG/ML
INJECTION, SOLUTION INTRAMUSCULAR; INTRAVENOUS AS NEEDED
Status: DISCONTINUED | OUTPATIENT
Start: 2023-01-20 | End: 2023-01-20

## 2023-01-20 RX ORDER — ONDANSETRON 2 MG/ML
4 INJECTION INTRAMUSCULAR; INTRAVENOUS EVERY 6 HOURS PRN
Status: DISCONTINUED | OUTPATIENT
Start: 2023-01-20 | End: 2023-01-20 | Stop reason: HOSPADM

## 2023-01-20 RX ORDER — CEFAZOLIN SODIUM 2 G/50ML
SOLUTION INTRAVENOUS
Status: COMPLETED
Start: 2023-01-20 | End: 2023-01-20

## 2023-01-20 RX ORDER — MAGNESIUM HYDROXIDE 1200 MG/15ML
LIQUID ORAL AS NEEDED
Status: DISCONTINUED | OUTPATIENT
Start: 2023-01-20 | End: 2023-01-20 | Stop reason: HOSPADM

## 2023-01-20 RX ORDER — MIDAZOLAM HYDROCHLORIDE 2 MG/2ML
INJECTION, SOLUTION INTRAMUSCULAR; INTRAVENOUS AS NEEDED
Status: DISCONTINUED | OUTPATIENT
Start: 2023-01-20 | End: 2023-01-20

## 2023-01-20 RX ORDER — FENTANYL CITRATE/PF 50 MCG/ML
25 SYRINGE (ML) INJECTION
Status: DISCONTINUED | OUTPATIENT
Start: 2023-01-20 | End: 2023-01-20 | Stop reason: HOSPADM

## 2023-01-20 RX ORDER — CHLORHEXIDINE GLUCONATE 0.12 MG/ML
RINSE ORAL
Status: DISPENSED
Start: 2023-01-20 | End: 2023-01-20

## 2023-01-20 RX ORDER — CHLORHEXIDINE GLUCONATE 4 G/100ML
SOLUTION TOPICAL DAILY PRN
Status: DISCONTINUED | OUTPATIENT
Start: 2023-01-20 | End: 2023-01-20 | Stop reason: HOSPADM

## 2023-01-20 RX ORDER — ACETAMINOPHEN 325 MG/1
650 TABLET ORAL EVERY 4 HOURS PRN
Status: DISCONTINUED | OUTPATIENT
Start: 2023-01-20 | End: 2023-01-20 | Stop reason: HOSPADM

## 2023-01-20 RX ORDER — PROPOFOL 10 MG/ML
INJECTION, EMULSION INTRAVENOUS CONTINUOUS PRN
Status: DISCONTINUED | OUTPATIENT
Start: 2023-01-20 | End: 2023-01-20

## 2023-01-20 RX ORDER — CEFAZOLIN SODIUM 2 G/50ML
2000 SOLUTION INTRAVENOUS ONCE
Status: COMPLETED | OUTPATIENT
Start: 2023-01-20 | End: 2023-01-20

## 2023-01-20 RX ORDER — PROPOFOL 10 MG/ML
INJECTION, EMULSION INTRAVENOUS AS NEEDED
Status: DISCONTINUED | OUTPATIENT
Start: 2023-01-20 | End: 2023-01-20

## 2023-01-20 RX ORDER — OXYCODONE HYDROCHLORIDE AND ACETAMINOPHEN 5; 325 MG/1; MG/1
1 TABLET ORAL EVERY 6 HOURS PRN
Qty: 12 TABLET | Refills: 0 | Status: SHIPPED | OUTPATIENT
Start: 2023-01-20 | End: 2023-01-30

## 2023-01-20 RX ADMIN — PROPOFOL 30 MG: 10 INJECTION, EMULSION INTRAVENOUS at 11:24

## 2023-01-20 RX ADMIN — PROPOFOL 30 MG: 10 INJECTION, EMULSION INTRAVENOUS at 11:22

## 2023-01-20 RX ADMIN — PROPOFOL 100 MCG/KG/MIN: 10 INJECTION, EMULSION INTRAVENOUS at 11:22

## 2023-01-20 RX ADMIN — CEFAZOLIN SODIUM 2000 MG: 2 SOLUTION INTRAVENOUS at 11:06

## 2023-01-20 RX ADMIN — FENTANYL CITRATE 50 MCG: 50 INJECTION INTRAMUSCULAR; INTRAVENOUS at 11:22

## 2023-01-20 RX ADMIN — CHLORHEXIDINE GLUCONATE 0.12% ORAL RINSE 15 ML: 1.2 LIQUID ORAL at 10:39

## 2023-01-20 RX ADMIN — MIDAZOLAM 2 MG: 1 INJECTION INTRAMUSCULAR; INTRAVENOUS at 11:18

## 2023-01-20 RX ADMIN — SODIUM CHLORIDE, SODIUM LACTATE, POTASSIUM CHLORIDE, AND CALCIUM CHLORIDE 125 ML/HR: .6; .31; .03; .02 INJECTION, SOLUTION INTRAVENOUS at 10:33

## 2023-01-20 NOTE — ANESTHESIA POSTPROCEDURE EVALUATION
Post-Op Assessment Note    CV Status:  Stable  Pain Score: 0    Pain management: adequate     Mental Status:  Arousable   Hydration Status:  Stable   PONV Controlled:  None   Airway Patency:  Patent      Post Op Vitals Reviewed: Yes      Staff: CRNA         No notable events documented      BP   133/90   Temp   97 3   Pulse 71   Resp   18   SpO2   94%

## 2023-01-20 NOTE — NURSING NOTE
Patient awake and alert  No pain  Patient ambulated on crutches to the bathroom and was able to urinate

## 2023-01-20 NOTE — OP NOTE
OPERATIVE REPORT - Podiatry  PATIENT NAME: Marilin Welch    :  1965  MRN: [id number]  Pt Location: [facility] OR ROOM 01    SURGERY DATE: 2023    Surgeon(s) and Role:     * Arley Monaco DPM - Primary    Pre-op Diagnosis:  Open fracture of base of fifth metatarsal bone of right foot at metaphyseal-diaphyseal junction with delayed healing, subsequent encounter [S99 191G]  Pain in right foot [M79 671]    Post-Op Diagnosis Codes:     * Open fracture of base of fifth metatarsal bone of right foot at metaphyseal-diaphyseal junction with delayed healing, subsequent encounter [S99 191G]     * Pain in right foot [M79 671]    Procedure(s) (LRB):  OPEN REDUCTION W/ INTERNAL FIXATION (ORIF) FOOT, ORIF dupree fracture 5th met (Right)    Specimen(s):  * No specimens in log *    Estimated Blood Loss:   Minimal    Drains:  * No LDAs found *    Anesthesia Type:   Conscious Sedation  with 20 ml of 1% Lidocaine and 0 5% Bupivacaine in a 1:1 mixture    Hemostasis:  -Atraumatic technique    Materials:  Implant Name Type Inv  Item Serial No   Lot No  LRB No  Used Action   SCREW PATI 5 X 55MM ASNIS JFX - [id number]  SCREW PATI 5 X 55MM ASNIS JFX  [employer]IR [employer]  Right 1 Implanted     -4-0 nylon    Operative Findings:  Consistent with diagnosis    Complications:   None    Procedure and Technique:     Under mild sedation, the patient was brought into the operating room and placed on the operating room table in the supine position  IV sedation was achieved by anesthesia team and a universal timeout was performed where all parties are in agreement of correct patient, correct procedure and correct site  A pneumatic tourniquet was then placed over the patient's right lower extremity with ample padding  A reverse Villalpando block was performed consisting of 20 ml of 1% Lidocaine and 0 5% Bupivacaine in a 1:1 mixture  The foot was then prepped and draped in the usual aseptic manner       Attention was then directed to the right lateral foot  Surgical marker was utilized to draw out the anatomy of the fifth metatarsal   A guidewire was then inserted through the skin percutaneously and a wire  was used to drive the guidewire into the fifth metatarsal base, and through the fracture site, and into the fifth metatarsal canal   Proper positioning of the wire was assessed under fluoroscopy and was noted to be excellent  Surgical blade was then utilized to create a small stab incision around the guidewire  Blunt dissection was performed with hemostats down to the level of bone  Over drilling was performed to the wire  A screw hole was then tapped utilizing the provided [employer] tap  Countersinking was performed  An appropriately sized cortical screw was then placed into the fifth metatarsal canal as described above in the implant section  Final positioning of the screw was assessed under fluoroscopy and was noted to be excellent  The incision site was then irrigated with a bulb syringe normal sterile saline  Skin was reapproximated with 4-0 nylon horizontal mattress technique  The incision site was then cleansed and dried and the incision was dressed with Betadine soaked Adaptic, 4 x 4 gauze, Laura, Webril, a posterior splint, and secured with a 4 and 6 inch Ace bandage    Normal hyperemic response was noted to all digits  The patient tolerated the procedure and anesthesia well without immediate complications and transferred to PACU with vital signs stable  Dr Jaja Brown was present during the entire procedure and participated in all key aspects  Joycelyn Lentz DPM  DATE: January 20, 2023  TIME: 12:21 PM      Portions of the record may have been created with voice recognition software  Occasional wrong word or "sound a like" substitutions may have occurred due to the inherent limitations of voice recognition software   Read the chart carefully and recognize, using context, where substitutions have occurred

## 2023-01-20 NOTE — DISCHARGE INSTR - AVS FIRST PAGE
Dr Zbigniew Sprague Instructions    1  Take your prescribed medication as directed  2  Upon arrival at home, lie down and elevate your surgical foot on 2 pillows  3  Remain quiet, off your feet as much as possible, for the first 24-48 hours  This is when your feet first swell and may become painful  After 48 hours you may begin limited walking following these restrictions:   Nonweightbearinbg to surgical foot  4  Drink large quantities of water and citrus fruit juice  Consume no alcohol  Continue a well-balanced diet  5  Report any unusual discomfort or fever to this office  6  A limited amount of discomfort and swelling is to be expected  In some cases the skin may take on a bruised appearance  The surgical solution that was applied to your foot prior to the operation is dark in color and the operation site may appear to be oozing when it actually is not  7  A slight amount of blood is to be expected, and is no cause for alarm  Do not remove the dressings  If there is active bleeding and if the bleeding persists, add additional gauze to the bandage, apply direct pressure, elevate your feet and call this office  8  Do not get the dressings wet  As regular bathing may be inconvenient, sponge baths are recommended  9  When anesthesia wears off and if any discomfort should be present, apply an ice pack directly over the operated area for 15 minute intervals for several hours or until the pain leaves  (USE IN EXCESS OF 15 MINUTES COULD CAUSE FROSTBITE)  Do not use hot water bags or electric pads  A convenient icepack can be made by placing ice cubes in a plastic bag and covering this with a towel  10  If necessary, take a mild laxative before retiring  11  Wear your special open shoes anytime you put weight on your foot, even if it is just to walk to the bathroom and back  It will probably be 2 or 3 weeks before you will be permitted to try regular shoes    12  Having performed the operation, we are interested in a prompt recovery  Please cooperate by following the above instructions  13  Please call to confirm your post-op appointment or call with any other questions

## 2023-01-20 NOTE — ANESTHESIA PREPROCEDURE EVALUATION
Procedure:  OPEN REDUCTION W/ INTERNAL FIXATION (ORIF) FOOT, ORIF dupree fracture 5th met (Right: Foot)    Relevant Problems   CARDIO   (+) Hypertension      NEURO/PSYCH   (+) Depression        Physical Exam    Airway    Mallampati score: I  TM Distance: >3 FB  Neck ROM: full     Dental       Cardiovascular  Cardiovascular exam normal    Pulmonary  Pulmonary exam normal     Other Findings        Anesthesia Plan  ASA Score- 2     Anesthesia Type- IV sedation with anesthesia with ASA Monitors  Additional Monitors:   Airway Plan:           Plan Factors-Exercise tolerance (METS): >4 METS  Chart reviewed  EKG reviewed  Imaging results reviewed  Existing labs reviewed  Patient summary reviewed  Obstructive sleep apnea risk education given perioperatively  Induction- intravenous  Postoperative Plan- Plan for postoperative opioid use  Planned trial extubation    Informed Consent- Anesthetic plan and risks discussed with patient  I personally reviewed this patient with the CRNA  Discussed and agreed on the Anesthesia Plan with the CRNA  Kymberly Crooks

## 2023-01-20 NOTE — INTERVAL H&P NOTE
H&P reviewed  After examining the patient I find no changes in the patients condition since the H&P had been written      Vitals:    01/20/23 1013   BP: 142/84   Pulse: 63   Resp: 18   Temp: 97 8 °F (36 6 °C)   SpO2: 95%

## 2023-01-20 NOTE — DISCHARGE SUMMARY
Discharge Summary Outpatient Procedure Podiatry -   Kristie Evans 56 y o  male MRN: [id number]  Unit/Bed#: OR POOL Encounter: [id number]    Admission Date: 1/20/2023     Admitting Diagnosis: Open fracture of base of fifth metatarsal bone of right foot at metaphyseal-diaphyseal junction with delayed healing, subsequent encounter [S99 191G]  Pain in right foot [M79 671]    Discharge Diagnosis: same    Procedures Performed: OPEN REDUCTION W/ INTERNAL FIXATION (ORIF) FOOT, ORIF dupree fracture 5th met: 31149 (CPT®)    Complications: none    Condition at Discharge: stable    Discharge instructions/Information to patient and family:   See after visit summary for information provided to patient and family  Provisions for Follow-Up Care/Important appointments:  See after visit summary for information related to follow-up care and any pertinent home health orders  Discharge Medications:  See after visit summary for reconciled discharge medications provided to patient and family

## 2023-01-27 ENCOUNTER — OFFICE VISIT (OUTPATIENT)
Dept: PODIATRY | Facility: CLINIC | Age: 58
End: 2023-01-27

## 2023-01-27 DIAGNOSIS — S99.191G: ICD-10-CM

## 2023-01-27 DIAGNOSIS — M79.671 PAIN IN RIGHT FOOT: Primary | ICD-10-CM

## 2023-01-27 NOTE — PROGRESS NOTES
PATIENT:  Tracee Dickerson      1965    ASSESSMENT     1  Pain in right foot        2  Open fracture of base of fifth metatarsal bone of right foot at metaphyseal-diaphyseal junction with delayed healing, subsequent encounter               PLAN  Patient is doing well post-operatively  Sutures left intact  Continue post-op care as instructed  Stressed on patient compliance about proper off-loading, staying off of feet, and proper dressing care  Call if any increase in pain, fevers, calf pain, shortness of breath, or general distress is noted  Patient instructed to go to ER if call is not returned immediately  -Nonweightbearing to the right foot with crutch assistance May shower, change Band-Aid with bacitracin  HISTORY OF PRESENT ILLNESS  Patient presents for post-op appointment  Post-op pain is under control and resolving well  The patient is feeling well and in good spirits  Patient reported no post-op concern  Patient relates compliance with cast shoe, elevation, and keeping dressing clean, dry and intact  REVIEW OF SYSTEMS  GENERAL: No fever or chills  HEART: No chest pain, or palpitation  RESPIRATORY:  No SOB or cough  GI: No Nausea, vomit or diarrhea  NEUROLOGIC: No syncope or acute weakness  MUSCULOSKELETAL: No calf pain or edema  PHYSICAL EXAMINATION  GENERAL  The patient appears in NAD / non-toxic  Afebrile  VSS    VASCULAR EXAM  Pedal pulses and vascular status are intact  No calf pain or edema bilaterally  No cyanosis  DERMATOLOGIC EXAM  Incision is coapted and healing well  No signs of infection  No active drainage  Normal post-op edema and ecchymosis  No necrosis or dehiscence  NEUROLOGIC EXAM  AAO X 3  No focal neurologic deficit  Neurologic status is intact BLE  MUSCULOSKELETAL EXAM  Good surgical correction  Normal post-op findings  ROM intact  No fluctuation or crepitus

## 2023-02-08 ENCOUNTER — OFFICE VISIT (OUTPATIENT)
Dept: PODIATRY | Facility: CLINIC | Age: 58
End: 2023-02-08

## 2023-02-08 VITALS — HEIGHT: 71 IN | BODY MASS INDEX: 34.3 KG/M2 | WEIGHT: 245 LBS

## 2023-02-08 DIAGNOSIS — M79.671 PAIN IN RIGHT FOOT: Primary | ICD-10-CM

## 2023-02-08 NOTE — PROGRESS NOTES
Assessment/Plan:      Diagnoses and all orders for this visit:    Pain in right foot      -Sutures removed today continue nonweightbearing with boot and crutch assistance  - Return in 2 weeks for repeat x-rays and hopeful transition to weightbearing in boot  -Return to shoes in 4 weeks for now  Subjective:     Patient ID: Bhumika Anderson is a 56 y o  male  Patient presents for evaluation management of his right foot he is now approximate 2-week status post Hoang fracture repair right foot  Nonweightbearing in boot states he is having minimal pain      Review of Systems   Constitutional: Negative for chills and fever  HENT: Negative for ear pain and sore throat  Eyes: Negative for pain and visual disturbance  Respiratory: Negative for cough and shortness of breath  Cardiovascular: Negative for chest pain and palpitations  Gastrointestinal: Negative for abdominal pain and vomiting  Genitourinary: Negative for dysuria and hematuria  Musculoskeletal: Negative for arthralgias and back pain  Skin: Negative for color change and rash  Neurological: Negative for seizures and syncope  All other systems reviewed and are negative  Objective:     Physical Exam  Vitals reviewed  Constitutional:       Appearance: Normal appearance  HENT:      Head: Normocephalic and atraumatic  Nose: Nose normal       Mouth/Throat:      Mouth: Mucous membranes are moist    Eyes:      Pupils: Pupils are equal, round, and reactive to light  Cardiovascular:      Pulses: Normal pulses  Pulmonary:      Effort: Pulmonary effort is normal    Musculoskeletal:         General: No swelling or tenderness  Comments: She is intact, no evidence of dehiscence healed no issues with suture removal   Skin:     Capillary Refill: Capillary refill takes less than 2 seconds  Neurological:      General: No focal deficit present  Mental Status: He is alert and oriented to person, place, and time   Mental status is at baseline

## 2023-02-09 ENCOUNTER — TELEPHONE (OUTPATIENT)
Dept: OBGYN CLINIC | Facility: HOSPITAL | Age: 58
End: 2023-02-09

## 2023-02-09 NOTE — TELEPHONE ENCOUNTER
Caller: HANNAH    Doctor/Office: Dr Lamont Andrade    CB#:       What needs to be faxed: Talita Dang from 01/27 and 02/08 -     (provided phone # for hospital medical records to get OP report)     ATTN to: Claim# [id number]    Fax#: [phone]       Documents were successfully e-faxed

## 2023-02-14 ENCOUNTER — HOSPITAL ENCOUNTER (OUTPATIENT)
Dept: SLEEP CENTER | Facility: HOSPITAL | Age: 58
Discharge: HOME | End: 2023-02-14

## 2023-02-14 DIAGNOSIS — E66.9 OBESITY (BMI 35.0-39.9 WITHOUT COMORBIDITY): ICD-10-CM

## 2023-02-14 DIAGNOSIS — H91.93 DECREASED HEARING OF BOTH EARS: ICD-10-CM

## 2023-02-14 DIAGNOSIS — I10 HYPERTENSION, UNSPECIFIED TYPE: ICD-10-CM

## 2023-02-16 NOTE — PROGRESS NOTES
Home Sleep Study Documentation    HOME STUDY DEVICE: Noxturnal no                                           Rosanne G3 yes      Pre-Sleep Home Study:    Set-up and instructions performed by: Adelina Leggett performed demonstration for Patient: yes    Return demonstration performed by Patient: yes    Written instructions provided to Patient: yes    Patient signed consent form: yes        Post-Sleep Home Study:    Additional comments by Patient: None    Home Sleep Study Failed:no:    Failure reason: N/A    Reported or Detected: N/A    Scored by: Ann Sánchez
upright (90 degrees)

## 2023-02-21 DIAGNOSIS — G47.33 OSA (OBSTRUCTIVE SLEEP APNEA): Primary | ICD-10-CM

## 2023-02-21 NOTE — RESULT ENCOUNTER NOTE
Hello team,  Patient had home sleep studying showing sleep apnea  I recommend he see sleep medicine to discuss further treatment options  I have already placed the consult but please make him aware of the results and advised him to call and schedule with sleep medicine    Thanks Francisca Pryor

## 2023-02-22 ENCOUNTER — OFFICE VISIT (OUTPATIENT)
Dept: PODIATRY | Facility: CLINIC | Age: 58
End: 2023-02-22

## 2023-02-22 ENCOUNTER — APPOINTMENT (OUTPATIENT)
Dept: RADIOLOGY | Facility: CLINIC | Age: 58
End: 2023-02-22

## 2023-02-22 VITALS — WEIGHT: 245 LBS | BODY MASS INDEX: 34.3 KG/M2 | HEIGHT: 71 IN

## 2023-02-22 DIAGNOSIS — M79.671 PAIN IN RIGHT FOOT: Primary | ICD-10-CM

## 2023-02-22 DIAGNOSIS — M79.671 PAIN IN RIGHT FOOT: ICD-10-CM

## 2023-02-22 NOTE — PROGRESS NOTES
Assessment/Plan:      Diagnoses and all orders for this visit:    Pain in right foot  -     X-ray foot right 3+ views; Future      -X-rays 3 views weightbearing taken reviewed of the right foot and do show collapse of the medial longitudinal arch that is chronic metatarsus foot deformity, intramedullary screw is intact, good compression, new onset osseous bridging on lateral view  - He may DC use of his crutch, weightbearing as tolerated to the right foot in cam boot, he is to return in 2 weeks for repeat x-rays and hopeful final sign off and transition back into work  - I discussed the need to obtain custom orthotics in the future due to his foot type  Subjective:     Patient ID: Armond Taylor is a 56 y o  male  Patient comes for evaluation management of his right foot, he is now 4 weeks status post Hoang fracture repair with intramedullary screw, he presents today ambulating in cam boot having minimal pain  States that he has occasional zingers but has minimal jair-incisional numbness or discomfort      Review of Systems   Constitutional: Negative for chills and fever  HENT: Negative for ear pain and sore throat  Eyes: Negative for pain and visual disturbance  Respiratory: Negative for cough and shortness of breath  Cardiovascular: Negative for chest pain and palpitations  Gastrointestinal: Negative for abdominal pain and vomiting  Genitourinary: Negative for dysuria and hematuria  Musculoskeletal: Negative for arthralgias and back pain  Skin: Negative for color change and rash  Neurological: Negative for seizures and syncope  All other systems reviewed and are negative  Objective:     Physical Exam  Vitals reviewed  Musculoskeletal:         General: No swelling or tenderness        Comments: Incision to the right foot is healed, no pain with palpation along the fracture site, no issues with internal hardware

## 2023-02-28 ENCOUNTER — TELEPHONE (OUTPATIENT)
Dept: PODIATRY | Facility: CLINIC | Age: 58
End: 2023-02-28

## 2023-02-28 NOTE — TELEPHONE ENCOUNTER
Caller: Jadene Schirmer /     Doctor: Dannielle Goyal / Monica Scott    Reason for call: Needs visit notes from 2/22 for this patient      Call back#: [phone]

## 2023-03-03 ENCOUNTER — OFFICE VISIT (OUTPATIENT)
Dept: INTERNAL MEDICINE CLINIC | Facility: CLINIC | Age: 58
End: 2023-03-03

## 2023-03-03 VITALS
SYSTOLIC BLOOD PRESSURE: 132 MMHG | HEIGHT: 71 IN | DIASTOLIC BLOOD PRESSURE: 80 MMHG | BODY MASS INDEX: 35.81 KG/M2 | OXYGEN SATURATION: 97 % | WEIGHT: 255.8 LBS | TEMPERATURE: 98.5 F | HEART RATE: 64 BPM

## 2023-03-03 DIAGNOSIS — Z12.12 ENCOUNTER FOR COLORECTAL CANCER SCREENING: Primary | ICD-10-CM

## 2023-03-03 DIAGNOSIS — I10 HYPERTENSION, UNSPECIFIED TYPE: ICD-10-CM

## 2023-03-03 DIAGNOSIS — Z12.11 ENCOUNTER FOR COLORECTAL CANCER SCREENING: Primary | ICD-10-CM

## 2023-03-03 DIAGNOSIS — L98.9 SKIN LESION OF FACE: ICD-10-CM

## 2023-03-03 DIAGNOSIS — Z12.5 SCREENING PSA (PROSTATE SPECIFIC ANTIGEN): ICD-10-CM

## 2023-03-03 DIAGNOSIS — E78.5 DYSLIPIDEMIA: ICD-10-CM

## 2023-03-03 DIAGNOSIS — R73.03 PREDIABETES: ICD-10-CM

## 2023-03-03 DIAGNOSIS — G47.33 OSA (OBSTRUCTIVE SLEEP APNEA): ICD-10-CM

## 2023-03-03 DIAGNOSIS — L30.9 DERMATITIS OF FACE: ICD-10-CM

## 2023-03-03 DIAGNOSIS — R93.2 ABNORMAL LIVER ULTRASOUND: ICD-10-CM

## 2023-03-03 DIAGNOSIS — Z80.42 FAMILY HISTORY OF PROSTATE CANCER IN FATHER: ICD-10-CM

## 2023-03-03 DIAGNOSIS — F32.A DEPRESSION, UNSPECIFIED DEPRESSION TYPE: ICD-10-CM

## 2023-03-03 DIAGNOSIS — E66.9 OBESITY (BMI 35.0-39.9 WITHOUT COMORBIDITY): ICD-10-CM

## 2023-03-03 NOTE — ASSESSMENT & PLAN NOTE
Aware of home sleep study results  He does have follow-up scheduled sleep medicine CPAP  Encouraged weight loss and sleeping on his side which can help with symptoms  Follow-up with sleep medicine and keep our office informed of any change in treatment plan

## 2023-03-03 NOTE — PROGRESS NOTES
[address]  Standard Office Visit  Patient ID: Iban Boyd    : 1965  Age/Gender: 56 y o  male     DATE: 3/3/2023      Assessment/Plan:    ELMER (obstructive sleep apnea)  Aware of home sleep study results  He does have follow-up scheduled sleep medicine CPAP  Encouraged weight loss and sleeping on his side which can help with symptoms  Follow-up with sleep medicine and keep our office informed of any change in treatment plan  Hypertension  Blood pressure today in the office  Encourage weight loss daily activity  Encouraged low-salt  Currently on some restrictions secondary to right foot surgery  Once cleared by podiatry patient plans to do regular physical activity which she has been doing prior to surgery with good control of his blood pressure  Advised him to monitor his blood pressure at home and keep a log  If blood pressure is greater than 130/80 consistently schedule follow-up sooner than on a regular 4-month follow-up  Skin lesion of face  Recently seen and evaluated by dermatology and had skin and per patient was told sx benign  Continue to follow with DERM     Abnormal liver ultrasound  Had an ultrasound of the liver in 2018 as well as MRI of the done in  and  which showed a stable appearing lesion  He has not had repeat imaging since that time  Discussed possible repeat imaging versus gastroenterology evaluation and treatment  Patient verbalized understanding and is willing to follow-up with gastroenterology at this time  Advised him to call and schedule with gastroenterology  Obesity (BMI 35 0-39 9 without comorbidity)  Gain approximately 10 pounds since prior visit in office  Likely secondary to decreased activity secondary to right foot fracture and surgery  Gave motivation for patient and encouragement for weight loss following surgery once he is able to return to daily exercise per podiatry  Encouraged portion control and diet and lifestyle modifications which can be beneficial now while he is not exercising as regularly as well as once he is able to return to exercise  Prediabetes  Follow-up with a fasting CMP  No medication changes  Encourage low-carb diet  Dermatitis of face  Discontinue neomycin  Start dove moisturizer to area twice daily  Depression  Stable at this time on Wellbutrin and Effexor  No dose change  We will continue to follow    Dyslipidemia  Continue lipitor  Labs to be done prior to our scheduled follow up  Diagnoses and all orders for this visit:    Encounter for colorectal cancer screening  -     Cologuard; Future    Dermatitis of face    ELMER (obstructive sleep apnea)    Hypertension, unspecified type  -     Comprehensive metabolic panel; Future  -     CBC and differential; Future  -     Lipid Panel with Direct LDL reflex; Future    Skin lesion of face    Prediabetes  -     Comprehensive metabolic panel; Future    Obesity (BMI 35 0-39 9 without comorbidity)    Abnormal liver ultrasound  -     CBC and differential; Future  -     Lipid Panel with Direct LDL reflex; Future  -     Ambulatory Referral to Gastroenterology; Future    Depression, unspecified depression type  -     CBC and differential; Future  -     Lipid Panel with Direct LDL reflex; Future  -     TSH, 3rd generation with Free T4 reflex; Future    Dyslipidemia  -     CBC and differential; Future  -     Lipid Panel with Direct LDL reflex; Future    Family history of prostate cancer in father  -     PSA, Total Screen; Future    Screening PSA (prostate specific antigen)  -     PSA, Total Screen; Future          BMI Counseling: Body mass index is 35 68 kg/m²  The BMI is above normal  Nutrition recommendations include limiting drinks that contain sugar, moderation in carbohydrate intake, increasing intake of lean protein and reducing intake of cholesterol   Exercise recommendations include exercising 3-5 times per week  No pharmacotherapy was ordered  Patient referred to PCP  Rationale for BMI follow-up plan is due to patient being overweight or obese  Depression Screening and Follow-up Plan: Continue regular follow-up with their mental health provider who is managing their mental health condition(s)  Subjective:   Chief Complaint   Patient presents with   • Follow-up     4 month f/u patient had surgery on right foot on Jan 21st   Mary Galvan 44 ordered due March 20th         Esdras Williamson is a 56 y o  male who presents to the office on 3/3/2023 for         57 yo male with h/o dyslipidemia, ELMER, skin lesion face,  HTN, prediabetes, abnormal liver U/S presents to office for chronic condition follow-up  Patient since last visit did follow-up with dermatology and had a biopsy performed on his face  He was told that his lesion was benign  He has been using some topical Neosporin or triple antibiotic cream for dry skin that he had on his  He notes that he feels this may be making his skin more dry  No eye pain  No eye drainage  No redness  The dry skin is dry and itchy  He has not tried topical moisturizers  Schedule gastroenterology but plans to do so  That he is doing okay since surgery  Trying to keep off his foot  Does use boot and crutches as directed  Does use his bike as was recommended by podiatry  Had CPAP and has follow up scheduled with sleep medicine  3/20/2020 cologuard negative  Anxiety and depressive sx have been controlled  Always have some worsening sx in Jan and February  Worse with foot injury and able to do less  He currently is out of work  Injury was workman's comp  Looking forward to some aspects of getting back to work  Follows with audiology  Was fit and is waiting to hear back regarding aides  Plans to follow up  Notes he has dryness above his eyelids  Has not used any topical  COVID 2 original series, no booster    Flu shot this fall   Shingles shot complete  The following portions of the patient's history were reviewed and updated as appropriate: allergies, current medications, past family history, past medical history, past social history, past surgical history and problem list     Review of Systems   Constitutional: Negative for chills and fever  HENT: Negative for rhinorrhea and sore throat  Follows with audiology  Was told he will be getting hearing aides  Has reached out but is waiting to hear back  Respiratory: Negative for cough, shortness of breath and wheezing  Cardiovascular: Negative for chest pain and palpitations  Gastrointestinal: Negative for abdominal distention, abdominal pain, blood in stool, constipation, diarrhea, nausea and vomiting  Genitourinary: Negative for dysuria and frequency  Musculoskeletal: Negative for arthralgias and back pain  Right foot pain improving since surgeyr   Skin: Negative for rash  Neurological: Negative for dizziness, syncope and light-headedness           Patient Active Problem List   Diagnosis   • Depression   • Dyslipidemia   • Obesity (BMI 35 0-39 9 without comorbidity)   • Snoring   • Decreased hearing of both ears   • Abnormal liver ultrasound   • Screening for malignant neoplasm of colon   • Needs flu shot   • Skin lesion of face   • Prediabetes   • Vaccine counseling   • Hypertension   • Closed nondisplaced fracture of fifth metatarsal bone   • Preop examination   • ELMER (obstructive sleep apnea)   • Dermatitis of face       Past Medical History:   Diagnosis Date   • Acute suppurative otitis media of left ear without spontaneous rupture of tympanic membrane 01/22/2018   • Anxiety    • Depression    • HL (hearing loss)    • Hyperlipidemia    • Hypertension    • Obesity (BMI 35 0-39 9 without comorbidity) 01/16/2017       Past Surgical History:   Procedure Laterality Date   • LA OPEN TREATMENT METATARSAL FRACTURE EACH Right 1/20/2023    Procedure: OPEN REDUCTION W/ INTERNAL FIXATION (ORIF) FOOT, ORIF dupree fracture 5th met;  Surgeon: Farzana Hairston DPM;  Location: [facility];  Service: Podiatry   • WISDOM TOOTH EXTRACTION           Current Outpatient Medications:   •  aspirin 81 MG tablet, Take 1 tablet by mouth daily, Disp: , Rfl:   •  atorvastatin (LIPITOR) 20 mg tablet, Take 1 tablet (20 mg total) by mouth daily, Disp: 90 tablet, Rfl: 1  •  buPROPion (WELLBUTRIN XL) 150 mg 24 hr tablet, Take 1 tablet (150 mg total) by mouth daily, Disp: 90 tablet, Rfl: 1  •  co-enzyme Q-10 30 MG capsule, Take 30 mg by mouth daily, Disp: , Rfl:   •  Green Tea 150 MG CAPS, Take by mouth daily, Disp: , Rfl:   •  Multiple Vitamins-Minerals (MULTIVITAMIN ADULT) TABS, Take 1 tablet by mouth daily, Disp: , Rfl:   •  Omega-3 Fatty Acids ([employer] FISH OIL) 1000 MG CAPS, Take 2 capsules by mouth 2 (two) times a day, Disp: , Rfl:   •  Turmeric Curcumin 500 MG CAPS, Take 1 capsule by mouth daily, Disp: , Rfl:   •  venlafaxine (EFFEXOR-XR) 150 mg 24 hr capsule, Take 1 capsule (150 mg total) by mouth daily, Disp: 90 capsule, Rfl: 1    No Known Allergies    Social History     Socioeconomic History   • Marital status:      Spouse name: None   • Number of children: None   • Years of education: None   • Highest education level: None   Occupational History   • None   Tobacco Use   • Smoking status: Former     Packs/day: 0 50     Years: 25 00     Pack years: 12 50     Types: Cigarettes     Start date: 200     Quit date: 2015     Years since quittin 2   • Smokeless tobacco: Never   Vaping Use   • Vaping Use: Never used   Substance and Sexual Activity   • Alcohol use:  Yes     Alcohol/week: 4 0 standard drinks     Types: 4 Cans of beer per week   • Drug use: Never   • Sexual activity: Yes   Other Topics Concern   • None   Social History Narrative   • None     Social Determinants of Health     Financial Resource Strain: Not on file   Food Insecurity: Not on file Transportation Needs: Not on file   Physical Activity: Not on file   Stress: Not on file   Social Connections: Not on file   Intimate Partner Violence: Not on file   Housing Stability: Not on file       Family History   Problem Relation Age of Onset   • Diabetes Father         borderline   • Hypothyroidism Father    • Prostate cancer Father    • Heart disease Father    • Skin cancer Father    • Dementia Father    • Hypothyroidism Sister    • Fibromyalgia Sister    • Heart disease Sister    • Ovarian cancer Mother    • Skin cancer Mother        PHQ-2/9 Depression Screening    Little interest or pleasure in doing things: 0 - not at all  Feeling down, depressed, or hopeless: 1 - several days  Trouble falling or staying asleep, or sleeping too much: 1 - several days  Feeling tired or having little energy: 0 - not at all  Poor appetite or overeatin - not at all  Feeling bad about yourself - or that you are a failure or have let yourself or your family down: 0 - not at all  Trouble concentrating on things, such as reading the newspaper or watching television: 0 - not at all  Moving or speaking so slowly that other people could have noticed   Or the opposite - being so fidgety or restless that you have been moving around a lot more than usual: 0 - not at all  Thoughts that you would be better off dead, or of hurting yourself in some way: 0 - not at all  PHQ-9 Score: 2   PHQ-9 Interpretation: No or Minimal depression          Health Maintenance   Topic Date Due   • Hepatitis C Screening  Never done   • HIV Screening  Never done   • Annual Physical  Never done   • DTaP,Tdap,and Td Vaccines (1 - Tdap) Never done   • COVID-19 Vaccine (3 - Booster for Moderna series) 2021   • Colorectal Cancer Screening  2023   • BMI: Followup Plan  2024   • BMI: Adult  2024   • Influenza Vaccine  Completed   • Pneumococcal Vaccine: Pediatrics (0 to 5 Years) and At-Risk Patients (6 to 59 Years)  Aged Out   • HIB Vaccine  Aged Out   • IPV Vaccine  Aged Out   • Hepatitis A Vaccine  Aged Out   • Meningococcal ACWY Vaccine  Aged Out   • HPV Vaccine  Aged Out       Immunization History   Administered Date(s) Administered   • COVID-19 MODERNA VACC 0 5 ML IM 04/21/2021, 05/24/2021   • H1N1, All Formulations 01/14/2010   • INFLUENZA 12/07/2015, 11/28/2016, 09/30/2022   • Influenza Quadrivalent Preservative Free 3 years and older IM 12/07/2015   • Influenza Quadrivalent, 6-35 Months IM 11/28/2016   • Influenza, recombinant, quadrivalent,injectable, preservative free 12/21/2018, 10/11/2019, 12/04/2020, 10/15/2021, 09/30/2022   • Zoster Vaccine Recombinant 12/04/2020, 09/30/2022, 09/30/2022        Objective:  Vitals:    03/03/23 0737 03/03/23 0835   BP: 130/84 132/80   BP Location: Left arm Left arm   Patient Position: Sitting Sitting   Cuff Size: Large    Pulse: 64    Temp: 98 5 °F (36 9 °C)    TempSrc: Temporal    SpO2: 97%    Weight: 116 kg (255 lb 12 8 oz)    Height: 5' 11" (1 803 m)      Wt Readings from Last 3 Encounters:   03/03/23 116 kg (255 lb 12 8 oz)   02/22/23 111 kg (245 lb)   02/08/23 111 kg (245 lb)     Body mass index is 35 68 kg/m²  No results found  Physical Exam  Vitals and nursing note reviewed  Constitutional:       General: He is not in acute distress  Appearance: Normal appearance  He is well-developed  He is not diaphoretic  HENT:      Head: Normocephalic and atraumatic  Right Ear: Tympanic membrane normal       Left Ear: Tympanic membrane normal       Mouth/Throat:      Mouth: Mucous membranes are moist       Pharynx: No oropharyngeal exudate or posterior oropharyngeal erythema  Eyes:      General: No scleral icterus  Right eye: No discharge  Left eye: No discharge  Pupils: Pupils are equal, round, and reactive to light  Cardiovascular:      Rate and Rhythm: Normal rate and regular rhythm  Heart sounds: Normal heart sounds  No murmur heard    Pulmonary: Effort: Pulmonary effort is normal  No respiratory distress  Breath sounds: Normal breath sounds  No wheezing or rales  Comments: CTA bilaterally  Abdominal:      General: Bowel sounds are normal  There is no distension  Palpations: Abdomen is soft  Tenderness: There is no abdominal tenderness  There is no right CVA tenderness, left CVA tenderness or guarding  Musculoskeletal:      Cervical back: Neck supple  Right lower leg: No edema  Left lower leg: No edema  Comments: Right foot in cam walker  Ambulating with crutches   Lymphadenopathy:      Cervical: No cervical adenopathy  Skin:     General: Skin is warm and dry  Neurological:      General: No focal deficit present  Mental Status: He is alert  Mental status is at baseline  Psychiatric:         Mood and Affect: Mood normal          Behavior: Behavior normal          Thought Content: Thought content normal              Future Appointments   Date Time Provider Jaqueline Taylor   3/8/2023 10:00 AM Leo Calle DPM [facility]   4/4/2023  1:50 PM Aimee Patiño MD [address]   7/7/2023  7:30 AM Katia Dowell PA-C [facility]   12/12/2023  4:00 PM All Jacques [facility]   12/12/2023  4:00 PM Emmy Kelley [facility]  [address] [facility] [place]    Patient Care Team:  Katia Dowell PA-C as PCP - General (Internal Medicine)    This note was completed in part utilizing [employer] Software  Grammatical errors, random word insertions, spelling mistakes, and incomplete sentences can be an occasional consequence of this system secondary to software limitations, ambient noise, and hardware issues  If you have any questions or concerns about the content, text, or information contained within the body of this dictation, please contact the provider for clarification

## 2023-03-03 NOTE — ASSESSMENT & PLAN NOTE
Blood pressure today in the office  Encourage weight loss daily activity  Encouraged low-salt  Currently on some restrictions secondary to right foot surgery  Once cleared by podiatry patient plans to do regular physical activity which she has been doing prior to surgery with good control of his blood pressure  Advised him to monitor his blood pressure at home and keep a log  If blood pressure is greater than 130/80 consistently schedule follow-up sooner than on a regular 4-month follow-up

## 2023-03-03 NOTE — ASSESSMENT & PLAN NOTE
Gain approximately 10 pounds since prior visit in office  Likely secondary to decreased activity secondary to right foot fracture and surgery  Gave motivation for patient and encouragement for weight loss following surgery once he is able to return to daily exercise per podiatry  Encouraged portion control and diet and lifestyle modifications which can be beneficial now while he is not exercising as regularly as well as once he is able to return to exercise

## 2023-03-03 NOTE — PATIENT INSTRUCTIONS
ELMER (obstructive sleep apnea)  Aware of home sleep study results  He does have follow-up scheduled sleep medicine CPAP  Encouraged weight loss and sleeping on his side which can help with symptoms  Follow-up with sleep medicine and keep our office informed of any change in treatment plan  Hypertension  Blood pressure today in the office  Encourage weight loss daily activity  Encouraged low-salt  Currently on some restrictions secondary to right foot surgery  Once cleared by podiatry patient plans to do regular physical activity which she has been doing prior to surgery with good control of his blood pressure  Advised him to monitor his blood pressure at home and keep a log  If blood pressure is greater than 130/80 consistently schedule follow-up sooner than on a regular 4-month follow-up  Skin lesion of face  Recently seen and evaluated by dermatology and had skin and per patient was told sx benign  Continue to follow with DERM Prn  Abnormal liver ultrasound  Had an ultrasound of the liver in 2018 as well as MRI of the done in 2019 and 20 which showed a stable appearing lesion  He has not had repeat imaging since that time  Discussed possible repeat imaging versus gastroenterology evaluation and treatment  Patient verbalized understanding and is willing to follow-up with gastroenterology at this time  Advised him to call and schedule with gastroenterology  Obesity (BMI 35 0-39 9 without comorbidity)  Gain approximately 10 pounds since prior visit in office  Likely secondary to decreased activity secondary to right foot fracture and surgery  Gave motivation for patient and encouragement for weight loss following surgery once he is able to return to daily exercise per podiatry  Encouraged portion control and diet and lifestyle modifications which can be beneficial now while he is not exercising as regularly as well as once he is able to return to exercise      Prediabetes  Follow-up with a fasting CMP  No medication changes  Encourage low-carb diet  Dermatitis of face  Discontinue neomycin  Start dove moisturizer to area twice daily

## 2023-03-03 NOTE — ASSESSMENT & PLAN NOTE
Had an ultrasound of the liver in 2018 as well as MRI of the done in 2019 and 20 which showed a stable appearing lesion  He has not had repeat imaging since that time  Discussed possible repeat imaging versus gastroenterology evaluation and treatment  Patient verbalized understanding and is willing to follow-up with gastroenterology at this time  Advised him to call and schedule with gastroenterology

## 2023-03-03 NOTE — ASSESSMENT & PLAN NOTE
Recently seen and evaluated by dermatology and had skin and per patient was told sx benign    Continue to follow with [facility]

## 2023-03-08 ENCOUNTER — OFFICE VISIT (OUTPATIENT)
Dept: PODIATRY | Facility: CLINIC | Age: 58
End: 2023-03-08

## 2023-03-08 ENCOUNTER — APPOINTMENT (OUTPATIENT)
Dept: RADIOLOGY | Facility: CLINIC | Age: 58
End: 2023-03-08

## 2023-03-08 VITALS — BODY MASS INDEX: 35.7 KG/M2 | HEIGHT: 71 IN | WEIGHT: 255 LBS

## 2023-03-08 DIAGNOSIS — M79.671 PAIN IN RIGHT FOOT: Primary | ICD-10-CM

## 2023-03-08 DIAGNOSIS — M79.671 PAIN IN RIGHT FOOT: ICD-10-CM

## 2023-03-08 NOTE — PROGRESS NOTES
Assessment/Plan:      Diagnoses and all orders for this visit:    Pain in right foot  -     X-ray foot right 3+ views; Future       -  Xr taken and reviewed of the right foot and show increased osseous bridging   - May return to normal shoe  - No further restrictions  - RTC prn    Subjective:     Patient ID: Figueroa Dailey is a 56 y o  male  Patient presents for evaluation management of right foot, complaining of stabbing pain occasionally to the little toe on the right foot  Presents today ambulating in boot      Review of Systems   Constitutional: Negative for chills and fever  HENT: Negative for ear pain and sore throat  Eyes: Negative for pain and visual disturbance  Respiratory: Negative for cough and shortness of breath  Cardiovascular: Negative for chest pain and palpitations  Gastrointestinal: Negative for abdominal pain and vomiting  Genitourinary: Negative for dysuria and hematuria  Musculoskeletal: Negative for arthralgias and back pain  Skin: Negative for color change and rash  Neurological: Negative for seizures and syncope  All other systems reviewed and are negative  Objective:     Physical Exam  Vitals reviewed  Constitutional:       Appearance: Normal appearance  He is obese  HENT:      Head: Normocephalic and atraumatic  Nose: Nose normal       Mouth/Throat:      Mouth: Mucous membranes are moist    Eyes:      Pupils: Pupils are equal, round, and reactive to light  Cardiovascular:      Pulses: Normal pulses  Pulmonary:      Effort: Pulmonary effort is normal    Musculoskeletal:      Comments: There is mild scar tissue formation with palpation along the small incision on the lateral aspect of the right foot, no pain with palpation of fracture site positive Tinel's along the sural nerve   Skin:     Capillary Refill: Capillary refill takes less than 2 seconds  Neurological:      General: No focal deficit present        Mental Status: He is alert and oriented to person, place, and time  Mental status is at baseline

## 2023-03-09 ENCOUNTER — TELEPHONE (OUTPATIENT)
Dept: PODIATRY | Facility: CLINIC | Age: 58
End: 2023-03-09

## 2023-03-09 NOTE — TELEPHONE ENCOUNTER
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

## 2023-04-04 ENCOUNTER — OFFICE VISIT (OUTPATIENT)
Dept: SLEEP CENTER | Facility: CLINIC | Age: 58
End: 2023-04-04

## 2023-04-04 VITALS
HEART RATE: 61 BPM | SYSTOLIC BLOOD PRESSURE: 128 MMHG | WEIGHT: 248.8 LBS | HEIGHT: 71 IN | DIASTOLIC BLOOD PRESSURE: 78 MMHG | BODY MASS INDEX: 34.83 KG/M2 | OXYGEN SATURATION: 98 %

## 2023-04-04 DIAGNOSIS — G47.09 OTHER INSOMNIA: ICD-10-CM

## 2023-04-04 DIAGNOSIS — G47.33 OSA (OBSTRUCTIVE SLEEP APNEA): Primary | ICD-10-CM

## 2023-04-04 NOTE — ASSESSMENT & PLAN NOTE
Moderate ELMER diagnosed on home sleep study  JONNA 26 8  Primary symptoms are snoring, daily fatigue, spontaneous awakenings overnight, witnessed apneas  No significant gasping/choking during sleep  I explained to the patient in details the pathophysiology of obstructive sleep apnea  I also explained the importance of treatment, and the consequences of untreated obstructive sleep apnea on underlying cardiovascular and cerebrovascular risk, morbidity, and mortality  Treatment options were offered, at this point best would be to try an auto titrating CPAP as it is the most effective therapy to eliminate sleep disordered breathing and improve daytime symptoms of ELMER with minimal side effects  CPAP would also eliminate snoring  Auto titrating CPAP ordered today 5-20 cm H2O with nasal interface  Compliance data reviewed 1 to 3 months after CPAP initiation  If intolerance will be encountered in the future, will discuss other treatment options such as mandibular advancement device, the importance of weight loss, positional therapy if applicable, or surgery if indicated

## 2023-04-04 NOTE — PROGRESS NOTES
Sleep Medicine Outpatient Note   Cm Sykes 56 y o  male MRN: [id number]  4/4/2023      Referring Physician: Darrell Antonio PA-C    Reason for Consultation:    Chief Complaint   Patient presents with   • Sleep Apnea         Assessment/Plan:    1  ELMER (obstructive sleep apnea)  Assessment & Plan: Moderate ELMER diagnosed on home sleep study  JONNA 26 8  Primary symptoms are snoring, daily fatigue, spontaneous awakenings overnight, witnessed apneas  No significant gasping/choking during sleep  I explained to the patient in details the pathophysiology of obstructive sleep apnea  I also explained the importance of treatment, and the consequences of untreated obstructive sleep apnea on underlying cardiovascular and cerebrovascular risk, morbidity, and mortality  Treatment options were offered, at this point best would be to try an auto titrating CPAP as it is the most effective therapy to eliminate sleep disordered breathing and improve daytime symptoms of ELMER with minimal side effects  CPAP would also eliminate snoring  Auto titrating CPAP ordered today 5-20 cm H2O with nasal interface  Compliance data reviewed 1 to 3 months after CPAP initiation  If intolerance will be encountered in the future, will discuss other treatment options such as mandibular advancement device, the importance of weight loss, positional therapy if applicable, or surgery if indicated  Orders:  -     Ambulatory Referral to Sleep Medicine  -     CPAP Auto New DME    2  Other insomnia  Assessment & Plan:  Occasionally will have issues with sleep maintenance, difficulty going back to sleep  This could be caused by obstructive sleep apnea we will see how this improves with CPAP treatment  Wellbutrin and venlafaxine can cause issues with sleep initiation and maintenance insomnia            Health Maintenance  Immunization History   Administered Date(s) Administered   • COVID-19 MODERNA VACC 0 5 ML IM 04/21/2021, 05/24/2021   • H1N1, All Formulations 01/14/2010   • INFLUENZA 12/07/2015, 11/28/2016, 09/30/2022   • Influenza Quadrivalent Preservative Free 3 years and older IM 12/07/2015   • Influenza Quadrivalent, 6-35 Months IM 11/28/2016   • Influenza, recombinant, quadrivalent,injectable, preservative free 12/21/2018, 10/11/2019, 12/04/2020, 10/15/2021, 09/30/2022   • Zoster Vaccine Recombinant 12/04/2020, 09/30/2022, 09/30/2022        Return in about 3 months (around 7/4/2023)  History of Present Illness   HPI:  Cm Sykes is a 56 y o  male who has a past medical history of anxiety, hyperlipidemia, who is presenting for the evaluation of moderate obstructive sleep apnea    Recently underwent home sleep study ordered by his PCP that showed moderate ELMER with JONNA 26 6  Normal baseline oxygenation    He has had snoring for the past 20 years  He had a sleep study in 2011 that was borderline  He was referred for sleep testing by his PCP due to snoring  Sleeps from 9-9:30 pm to 4am   No issues with sleep initiation  Some issues with sleep maintenance  Occasionally he takes CBD or melatonin for sleep initiation  16 oz coffee during the day  Occasional tea  No morning headaches  No bruxism  No abnormal leg movements  No sx suggestive of restless leg syndrome, cataplexy, parasomnias, sleep paralysis, hallucinations  Quit smoking in 2018  Daily beer  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 [address]  He has some daily fatigue  He does not have to take naps  He does not doze off when inactive  Currently is on Wellbutrin, venlafaxine, Lipitor      Questionnaires:   [place] is [address]    Historical Information   Past Medical History:   Diagnosis Date   • Acute suppurative otitis media of left ear without spontaneous rupture of tympanic membrane 01/22/2018   • Anxiety    • Depression    • HL (hearing loss)    • Hyperlipidemia    • Hypertension    • Obesity (BMI 35 0-39 9 without comorbidity) 01/16/2017 "    Past Surgical History:   Procedure Laterality Date   • [facility] OPEN TREATMENT METATARSAL FRACTURE EACH Right 1/20/2023    Procedure: OPEN REDUCTION W/ INTERNAL FIXATION (ORIF) FOOT, ORIF dupree fracture 5th met;  Surgeon: Tyler Maki DPM;  Location: [facility];  Service: Podiatry   • WISDOM TOOTH EXTRACTION       Family History   Problem Relation Age of Onset   • Diabetes Father         borderline   • Hypothyroidism Father    • Prostate cancer Father    • Heart disease Father    • Skin cancer Father    • Dementia Father    • Hypothyroidism Sister    • Fibromyalgia Sister    • Heart disease Sister    • Ovarian cancer Mother    • Skin cancer Mother          Meds/Allergies     Current Outpatient Medications:   •  aspirin 81 MG tablet, Take 1 tablet by mouth daily, Disp: , Rfl:   •  atorvastatin (LIPITOR) 20 mg tablet, Take 1 tablet (20 mg total) by mouth daily, Disp: 90 tablet, Rfl: 1  •  buPROPion (WELLBUTRIN XL) 150 mg 24 hr tablet, Take 1 tablet (150 mg total) by mouth daily, Disp: 90 tablet, Rfl: 1  •  co-enzyme Q-10 30 MG capsule, Take 30 mg by mouth daily, Disp: , Rfl:   •  Green Tea 150 MG CAPS, Take by mouth daily, Disp: , Rfl:   •  Multiple Vitamins-Minerals (MULTIVITAMIN ADULT) TABS, Take 1 tablet by mouth daily, Disp: , Rfl:   •  Omega-3 Fatty Acids ([employer] FISH OIL) 1000 MG CAPS, Take 2 capsules by mouth 2 (two) times a day, Disp: , Rfl:   •  Turmeric Curcumin 500 MG CAPS, Take 1 capsule by mouth daily, Disp: , Rfl:   •  venlafaxine (EFFEXOR-XR) 150 mg 24 hr capsule, Take 1 capsule (150 mg total) by mouth daily, Disp: 90 capsule, Rfl: 1  No Known Allergies    Vitals: Blood pressure 128/78, pulse 61, height 5' 11\" (1 803 m), weight 113 kg (248 lb 12 8 oz), SpO2 98 %  Body mass index is 34 7 kg/m²  Oxygen Therapy  SpO2: 98 %      Physical Exam  Vitals and nursing note reviewed  Constitutional:       General: He is not in acute distress  Appearance: He is well-developed     HENT:      Head: " Normocephalic and atraumatic  Mouth/Throat:      Comments: Mallampati 4, decreased uvula size, no significant tonsillar hypertrophy  Eyes:      General: No scleral icterus  Conjunctiva/sclera: Conjunctivae normal    Neck:      Comments: No cervical lymphadenopathy  Cardiovascular:      Rate and Rhythm: Normal rate and regular rhythm  Pulmonary:      Effort: Pulmonary effort is normal  No respiratory distress  Abdominal:      Palpations: Abdomen is soft  Tenderness: There is no abdominal tenderness  Musculoskeletal:         General: No swelling  Cervical back: Normal range of motion and neck supple  No rigidity or tenderness  Right lower leg: No edema  Left lower leg: No edema  Lymphadenopathy:      Cervical: No cervical adenopathy  Skin:     General: Skin is warm and dry  Capillary Refill: Capillary refill takes less than 2 seconds  Neurological:      Mental Status: He is alert  Psychiatric:         Mood and Affect: Mood normal        Neck Circumference: 17     Labs: I have personally reviewed pertinent lab results      ABG: No results found for: PHART, BSI6EVI, PO2ART, LBT0EHT, S0OSJYDV, BEART, SOURCE,   BNP: No results found for: BNP,   CBC:  Lab Results   Component Value Date    WBC 6 1 09/24/2022    HGB 14 6 09/24/2022    HCT 43 6 09/24/2022    MCV 93 8 09/24/2022     09/24/2022    EOSPCT 2 6 09/24/2022    EOSABS 159 09/24/2022    NEUTOPHILPCT 55 09/24/2022    LYMPHOPCT 33 6 09/24/2022   ,   CMP:   Lab Results   Component Value Date    SODIUM 137 09/24/2022    K 4 4 09/24/2022     09/24/2022    CO2 29 09/24/2022    BUN 16 09/24/2022    CREATININE 0 83 09/24/2022    CALCIUM 9 6 09/24/2022    AST 20 09/24/2022    ALT 27 09/24/2022    ALKPHOS 51 09/24/2022    PROT 7 2 10/14/2017    PROT 7 2 10/14/2017    BILITOT 1 4 (H) 10/14/2017    BILITOT 1 4 (H) 10/14/2017    EGFR 103 09/24/2022   ,   PT/INR: No results found for: PT, INR,   Ferrtin: No components "found for: Ricarda TREVIZOium: No results found for: MAGNESIUM,      Imaging and other studies: I have personally reviewed pertinent reports  and I have personally reviewed pertinent films in PACS      Sleep Study:  Home sleep study 2/16/2023  Moderate obstructive sleep apnea, JONNA 26 6  Baseline normal oxygen saturation, event related desaturations  Buzz Chu MD  Pulmonary, Critical Care and Sleep Medicine  [facility]     Portions of the record may have been created with voice recognition software  Occasional wrong word or \"sound a like\" substitutions may have occurred due to the inherent limitations of voice recognition software  Please read the chart carefully and recognize, using context, where substitutions have occurred     "

## 2023-04-04 NOTE — ASSESSMENT & PLAN NOTE
Occasionally will have issues with sleep maintenance, difficulty going back to sleep  This could be caused by obstructive sleep apnea we will see how this improves with CPAP treatment  Wellbutrin and venlafaxine can cause issues with sleep initiation and maintenance insomnia

## 2023-04-04 NOTE — PATIENT INSTRUCTIONS
1   Continue use of CPAP equipment nightly - use any time you are laying down to rest, watch TV, etc to increase use and in case you fall asleep to prevent falling asleep without it  2  If air is too hot, turn down the tubing heating setting  3  If excessive dry mouth in the morning, turn up humidifier setting and be sure to only use distilled water in your humidifier  4   Change your filter once a month and wash the non-disposable filter  5  Continue to clean your equipment, as discussed, using mild soap and water  You can use unscented baby wipe on the mask  6   Contact the [facility] with any questions or concerns prior to your next visit, as needed  7  Schedule visit for follow-up in 3 months  You should see your sleep physician at least once a year  Nursing Support:  When: Monday through Friday 7A-5PM except holidays  Where: Our direct line is [phone]  If you are having a true emergency please call 911  In the event that the line is busy or it is after hours please leave a voice message and we will return your call  Please speak clearly, leaving your full name, birth date, best number to reach you and the reason for your call  Medication refills: We will need the name of the medication, the dosage, the ordering provider, whether you get a 30 or 90 day refill, and the pharmacy name and address  Medications will be ordered by the provider only  Nurses cannot call in prescriptions  Please allow 7 days for medication refills  Physician requested updates: If your provider requested that you call with an update after starting medication, please be ready to provide us the medication and dosage, what time you take your medication, the time you attempt to fall asleep, time you fall asleep, when you wake up, and what time you get out of bed  Sleep Study Results: We will contact you with sleep study results and/or next steps after the physician has reviewed your testing

## 2023-04-05 ENCOUNTER — TELEPHONE (OUTPATIENT)
Dept: SLEEP CENTER | Facility: CLINIC | Age: 58
End: 2023-04-05

## 2023-04-06 LAB

## 2023-06-26 NOTE — PROGRESS NOTES
Sleep Medicine Outpatient Note   Дмитрий Ortega 66 y o  male MRN: [id number]  6/27/2023      Referring Physician: No ref  provider found    Reason for Consultation:    Chief Complaint   Patient presents with   • Follow-up         Assessment/Plan:    1  ELMER (obstructive sleep apnea)  Assessment & Plan:  Adequate compliance and response to therapy  Continue with current autoCPAP therapy  Advised to attempt to use device 6+ hours per night  Will refill DME supplies  Follow up in 1 year    Orders:  -     PAP DME Resupply/Reorder          Health Maintenance  Immunization History   Administered Date(s) Administered   • COVID-19 MODERNA VACC 0 5 ML IM 04/21/2021, 05/24/2021   • H1N1, All Formulations 01/14/2010   • INFLUENZA 12/07/2015, 11/28/2016, 09/30/2022   • Influenza Quadrivalent Preservative Free 3 years and older IM 12/07/2015   • Influenza Quadrivalent, 6-35 Months IM 11/28/2016   • Influenza, recombinant, quadrivalent,injectable, preservative free 12/21/2018, 10/11/2019, 12/04/2020, 10/15/2021, 09/30/2022   • Zoster Vaccine Recombinant 12/04/2020, 09/30/2022, 09/30/2022        Return in about 1 year (around 6/27/2024)  History of Present Illness   HPI:  Дмитрий Ortega is a 66 y o  male who has a past medical history of anxiety, hyperlipidemia, who is presenting for the evaluation of moderate obstructive sleep apnea    Recently underwent home sleep study ordered by his PCP that showed moderate ELMER with JONNA 26 6  Normal baseline oxygenation    He has had snoring for the past 20 years  He had a sleep study in 2011 that was borderline  He was referred for sleep testing by his PCP due to snoring  Sleeps from 9-9:30 pm to 4am   No issues with sleep initiation  Some issues with sleep maintenance  Occasionally he takes CBD or melatonin for sleep initiation  16 oz coffee during the day  Occasional tea  No morning headaches  No bruxism  No abnormal leg movements      No sx suggestive of restless leg syndrome, cataplexy, parasomnias, sleep paralysis, hallucinations  Quit smoking in 2018  Daily beer  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10  He has some daily fatigue  He does not have to take naps  He does not doze off when inactive  Currently is on Wellbutrin, venlafaxine, Lipitor  States he notices a slight improvement  Using the device approximately 5 hours per night  Attempting to sleep 7 hours  If wakes up during the night he'll take it off  He states he wakes up once per night and takes some time to go back to sleep  Usually takes an hour or so to go back to sleep  He usually gets out of bed, goes to the bathroom, interacts with his cat  He in uncertain what wakes him up  He is usually awoken due to having to go to the bathroom  He has not had his prostate checked  Occasionally gets a PSA  Not feeling excessively tired through out the day  No limitations in his daily activities  No caffeine in evening  Does drink water prior to bed       Questionnaires:   [place] is 10    Historical Information   Past Medical History:   Diagnosis Date   • Acute suppurative otitis media of left ear without spontaneous rupture of tympanic membrane 01/22/2018   • Anxiety    • Depression    • HL (hearing loss)    • Hyperlipidemia    • Hypertension    • Obesity (BMI 35 0-39 9 without comorbidity) 01/16/2017     Past Surgical History:   Procedure Laterality Date   • [place] OPEN TREATMENT METATARSAL FRACTURE EACH Right 1/20/2023    Procedure: OPEN REDUCTION W/ INTERNAL FIXATION (ORIF) FOOT, ORIF dupree fracture 5th met;  Surgeon: Cecelia Cartwright DPM;  Location: [facility];  Service: Podiatry   • WISDOM TOOTH EXTRACTION       Family History   Problem Relation Age of Onset   • Diabetes Father         borderline   • Hypothyroidism Father    • Prostate cancer Father    • Heart disease Father    • Skin cancer Father    • Dementia Father    • Hypothyroidism Sister    • Fibromyalgia Sister    • Heart disease Sister "  • Ovarian cancer Mother    • Skin cancer Mother          Meds/Allergies     Current Outpatient Medications:   •  aspirin 81 MG tablet, Take 1 tablet by mouth daily, Disp: , Rfl:   •  atorvastatin (LIPITOR) 20 mg tablet, Take 1 tablet (20 mg total) by mouth daily, Disp: 90 tablet, Rfl: 1  •  buPROPion (WELLBUTRIN XL) 150 mg 24 hr tablet, Take 1 tablet (150 mg total) by mouth daily, Disp: 90 tablet, Rfl: 1  •  co-enzyme Q-10 30 MG capsule, Take 30 mg by mouth daily, Disp: , Rfl:   •  Green Tea 150 MG CAPS, Take by mouth daily, Disp: , Rfl:   •  Multiple Vitamins-Minerals (MULTIVITAMIN ADULT) TABS, Take 1 tablet by mouth daily, Disp: , Rfl:   •  Omega-3 Fatty Acids ([employer] FISH OIL) 1000 MG CAPS, Take 2 capsules by mouth 2 (two) times a day, Disp: , Rfl:   •  Turmeric Curcumin 500 MG CAPS, Take 1 capsule by mouth daily, Disp: , Rfl:   •  venlafaxine (EFFEXOR-XR) 150 mg 24 hr capsule, Take 1 capsule (150 mg total) by mouth daily, Disp: 90 capsule, Rfl: 1  No Known Allergies    Vitals: Blood pressure 132/78, pulse 69, height 5' 11\" (1 803 m), weight 109 kg (240 lb 3 2 oz), SpO2 97 %  Body mass index is 33 5 kg/m²  Oxygen Therapy  SpO2: 97 %      Physical Exam  Vitals and nursing note reviewed  Constitutional:       General: He is not in acute distress  Appearance: He is well-developed  HENT:      Head: Normocephalic and atraumatic  Mouth/Throat:      Comments: Mallampati 4, decreased uvula size, no significant tonsillar hypertrophy  Eyes:      General: No scleral icterus  Conjunctiva/sclera: Conjunctivae normal    Neck:      Comments: No cervical lymphadenopathy  Cardiovascular:      Rate and Rhythm: Normal rate and regular rhythm  Pulmonary:      Effort: Pulmonary effort is normal  No respiratory distress  Abdominal:      Palpations: Abdomen is soft  Tenderness: There is no abdominal tenderness  Musculoskeletal:         General: No swelling        Cervical back: Normal range of motion and neck " "supple  No rigidity or tenderness  Right lower leg: No edema  Left lower leg: No edema  Lymphadenopathy:      Cervical: No cervical adenopathy  Skin:     General: Skin is warm and dry  Capillary Refill: Capillary refill takes less than 2 seconds  Neurological:      Mental Status: He is alert  Psychiatric:         Mood and Affect: Mood normal              Labs: I have personally reviewed pertinent lab results  ABG: No results found for: \"PHART\", \"XTU0USF\", \"PO2ART\", \"LZU9UFH\", \"L6XMUAKE\", \"BEART\", \"SOURCE\",   BNP: No results found for: \"BNP\",   CBC:  Lab Results   Component Value Date    WBC 6 1 09/24/2022    HGB 14 6 09/24/2022    HCT 43 6 09/24/2022    MCV 93 8 09/24/2022     09/24/2022    EOSPCT 2 6 09/24/2022    EOSABS 159 09/24/2022    NEUTOPHILPCT 55 09/24/2022    LYMPHOPCT 33 6 09/24/2022   ,   CMP:   Lab Results   Component Value Date    SODIUM 137 09/24/2022    K 4 4 09/24/2022     09/24/2022    CO2 29 09/24/2022    BUN 16 09/24/2022    CREATININE 0 83 09/24/2022    CALCIUM 9 6 09/24/2022    AST 20 09/24/2022    ALT 27 09/24/2022    ALKPHOS 51 09/24/2022    PROT 7 2 10/14/2017    PROT 7 2 10/14/2017    BILITOT 1 4 (H) 10/14/2017    BILITOT 1 4 (H) 10/14/2017    EGFR 103 09/24/2022   ,   PT/INR: No results found for: \"PT\", \"INR\",   Ferrtin: No components found for: \"FERRTIN\",  Magensium: No results found for: \"MAGNESIUM\",      Imaging and other studies: I have personally reviewed pertinent reports  and I have personally reviewed pertinent films in PACS      Sleep Study:  Home sleep study 2/16/2023  Moderate obstructive sleep apnea, JONNA 26 6  Baseline normal oxygen saturation, event related desaturations  Isabel Mann DO  Pulmonary, Critical Care and Sleep Medicine  [facility]     Portions of the record may have been created with voice recognition software   Occasional wrong word or \"sound a like\" substitutions may " have occurred due to the inherent limitations of voice recognition software  Please read the chart carefully and recognize, using context, where substitutions have occurred

## 2023-06-27 ENCOUNTER — OFFICE VISIT (OUTPATIENT)
Dept: SLEEP CENTER | Facility: CLINIC | Age: 58
End: 2023-06-27
Payer: COMMERCIAL

## 2023-06-27 VITALS
BODY MASS INDEX: 33.63 KG/M2 | WEIGHT: 240.2 LBS | OXYGEN SATURATION: 97 % | HEART RATE: 69 BPM | DIASTOLIC BLOOD PRESSURE: 78 MMHG | HEIGHT: 71 IN | SYSTOLIC BLOOD PRESSURE: 132 MMHG

## 2023-06-27 DIAGNOSIS — G47.33 OSA (OBSTRUCTIVE SLEEP APNEA): Primary | ICD-10-CM

## 2023-06-27 PROBLEM — Z23 NEEDS FLU SHOT: Status: RESOLVED | Noted: 2019-10-11 | Resolved: 2023-06-27

## 2023-06-27 PROBLEM — Z71.85 VACCINE COUNSELING: Status: RESOLVED | Noted: 2020-12-04 | Resolved: 2023-06-27

## 2023-06-27 PROBLEM — Z01.818 PREOP EXAMINATION: Status: RESOLVED | Noted: 2023-01-13 | Resolved: 2023-06-27

## 2023-06-27 PROCEDURE — 99213 OFFICE O/P EST LOW 20 MIN: CPT | Performed by: INTERNAL MEDICINE

## 2023-06-27 NOTE — PATIENT INSTRUCTIONS
CPAP MAINTENANCE AND MANAGEMENT    1  Continue use of CPAP equipment nightly - use any time you are laying down to rest, watch TV, etc to increase use and in case you fall asleep to prevent falling asleep without it  2  If air is too hot, turn down the tubing heating setting  3  If excessive dry mouth in the morning, turn up humidifier setting and be sure to only use distilled water in your humidifier  You can also turn down the tubing heating setting  4  Change your filter regularly and wash the non-disposable filter  5  Continue to clean your equipment, as discussed, using mild soap and water  You can use unscented baby wipe on the mask  6   Contact the [facility] with any questions or concerns prior to your next visit, as needed  7  Schedule visit for follow-up in 1 year  You should see your sleep physician at least once a year  HOW TO CLEAN YOUR AUTO CPAP MACHINE    Mask: Clean the cushion of your mask daily  Dish soap and warm water  (Usually eligible for mask cushions every 3 months)  2  Headgear: Once a week  I find when you wash it daily it eats the material of the Velcro faster   (Usually eligible for new headgear every 6 months)  3  Heated Tubing: Clean the tube every 3-7 days  One drop of dish soap run warm water through the tube then hang it over your shower curtain and let it drip dry  (Usually eligible every 3 months)  4  Humidifier: Rinse out every 1-3 days  Dish soap warm water or , top shelf  (eligible every 6 months) **DISTILLED WATER ONLY**  5  Filters:  Dark blue (reusable for 6 months)- Rinse under warm water (no soap) sit and drip dry every 2-3 weeks  (eligible for a new one every 6 months)  Light Blue (disposable) throw away every 2-4 weeks depending on how dirty it looks  (eligible for 6 every 3 months)    Check with your insurance and durable medical equipment company regarding supply replacements       Nursing Support:  When: Monday through Friday 7A-5PM except holidays  Where: Our direct line is [phone]  If you are having a true emergency please call 911  In the event that the line is busy or it is after hours please leave a voice message and we will return your call  Please speak clearly, leaving your full name, birth date, best number to reach you and the reason for your call  Medication refills: We will need the name of the medication, the dosage, the ordering provider, whether you get a 30 or 90 day refill, and the pharmacy name and address  Medications will be ordered by the provider only  Nurses cannot call in prescriptions  Please allow 7 days for medication refills  Physician requested updates: If your provider requested that you call with an update after starting medication, please be ready to provide us the medication and dosage, what time you take your medication, the time you attempt to fall asleep, time you fall asleep, when you wake up, and what time you get out of bed  Sleep Study Results: We will contact you with sleep study results and/or next steps after the physician has reviewed your testing  Usually one of our nurses will call to talk about the results within 1-2 weeks of testing

## 2023-06-27 NOTE — ASSESSMENT & PLAN NOTE
· Adequate compliance and response to therapy  · Continue with current autoCPAP therapy  · Advised to attempt to use device 6+ hours per night  · Will refill DME supplies  · Follow up in 1 year [facility]

## 2023-06-28 ENCOUNTER — TELEPHONE (OUTPATIENT)
Dept: SLEEP CENTER | Facility: CLINIC | Age: 58
End: 2023-06-28

## 2023-06-28 LAB

## 2023-07-05 LAB
DME PARACHUTE DELIVERY DATE ACTUAL: NORMAL
DME PARACHUTE DELIVERY DATE REQUESTED: NORMAL
DME PARACHUTE ITEM DESCRIPTION: NORMAL
DME PARACHUTE ORDER STATUS: NORMAL
DME PARACHUTE SUPPLIER NAME: NORMAL
DME PARACHUTE SUPPLIER PHONE: NORMAL

## 2023-07-06 DIAGNOSIS — E78.5 DYSLIPIDEMIA: ICD-10-CM

## 2023-07-06 DIAGNOSIS — F32.A DEPRESSION, UNSPECIFIED DEPRESSION TYPE: ICD-10-CM

## 2023-07-06 RX ORDER — ATORVASTATIN CALCIUM 20 MG/1
20 TABLET, FILM COATED ORAL DAILY
Qty: 90 TABLET | Refills: 1 | Status: SHIPPED | OUTPATIENT
Start: 2023-07-06

## 2023-07-06 RX ORDER — VENLAFAXINE HYDROCHLORIDE 150 MG/1
150 CAPSULE, EXTENDED RELEASE ORAL DAILY
Qty: 90 CAPSULE | Refills: 1 | Status: SHIPPED | OUTPATIENT
Start: 2023-07-06

## 2023-07-06 RX ORDER — BUPROPION HYDROCHLORIDE 150 MG/1
150 TABLET ORAL DAILY
Qty: 90 TABLET | Refills: 1 | Status: SHIPPED | OUTPATIENT
Start: 2023-07-06

## 2023-10-17 NOTE — TELEPHONE ENCOUNTER
Last office visit 3/3  Next Office Visit not scheduled sent patient message to schedule a follow up appointment.

## 2023-10-30 LAB
ALBUMIN SERPL-MCNC: 4.2 G/DL (ref 3.6–5.1)
ALBUMIN/GLOB SERPL: 1.7 (CALC) (ref 1–2.5)
ALP SERPL-CCNC: 57 U/L (ref 35–144)
ALT SERPL-CCNC: 22 U/L (ref 9–46)
AST SERPL-CCNC: 18 U/L (ref 10–35)
BASOPHILS # BLD AUTO: 41 CELLS/UL (ref 0–200)
BASOPHILS NFR BLD AUTO: 0.7 %
BILIRUB SERPL-MCNC: 1 MG/DL (ref 0.2–1.2)
BUN SERPL-MCNC: 14 MG/DL (ref 7–25)
BUN/CREAT SERPL: ABNORMAL (CALC) (ref 6–22)
CALCIUM SERPL-MCNC: 9.4 MG/DL (ref 8.6–10.3)
CHLORIDE SERPL-SCNC: 101 MMOL/L (ref 98–110)
CHOLEST SERPL-MCNC: 155 MG/DL
CHOLEST/HDLC SERPL: 2.5 (CALC)
CO2 SERPL-SCNC: 29 MMOL/L (ref 20–32)
CREAT SERPL-MCNC: 0.85 MG/DL (ref 0.7–1.3)
EOSINOPHIL # BLD AUTO: 157 CELLS/UL (ref 15–500)
EOSINOPHIL NFR BLD AUTO: 2.7 %
ERYTHROCYTE [DISTWIDTH] IN BLOOD BY AUTOMATED COUNT: 13.1 % (ref 11–15)
GFR/BSA.PRED SERPLBLD CYS-BASED-ARV: 102 ML/MIN/1.73M2
GLOBULIN SER CALC-MCNC: 2.5 G/DL (CALC) (ref 1.9–3.7)
GLUCOSE SERPL-MCNC: 116 MG/DL (ref 65–99)
HCT VFR BLD AUTO: 40.2 % (ref 38.5–50)
HDLC SERPL-MCNC: 61 MG/DL
HGB BLD-MCNC: 13.8 G/DL (ref 13.2–17.1)
LDLC SERPL CALC-MCNC: 74 MG/DL (CALC)
LYMPHOCYTES # BLD AUTO: 1659 CELLS/UL (ref 850–3900)
LYMPHOCYTES NFR BLD AUTO: 28.6 %
MCH RBC QN AUTO: 31.2 PG (ref 27–33)
MCHC RBC AUTO-ENTMCNC: 34.3 G/DL (ref 32–36)
MCV RBC AUTO: 90.7 FL (ref 80–100)
MONOCYTES # BLD AUTO: 365 CELLS/UL (ref 200–950)
MONOCYTES NFR BLD AUTO: 6.3 %
NEUTROPHILS # BLD AUTO: 3579 CELLS/UL (ref 1500–7800)
NEUTROPHILS NFR BLD AUTO: 61.7 %
NONHDLC SERPL-MCNC: 94 MG/DL (CALC)
PLATELET # BLD AUTO: 220 THOUSAND/UL (ref 140–400)
PMV BLD REES-ECKER: 10.6 FL (ref 7.5–12.5)
POTASSIUM SERPL-SCNC: 4.5 MMOL/L (ref 3.5–5.3)
PROT SERPL-MCNC: 6.7 G/DL (ref 6.1–8.1)
PSA SERPL-MCNC: 0.38 NG/ML
RBC # BLD AUTO: 4.43 MILLION/UL (ref 4.2–5.8)
SODIUM SERPL-SCNC: 136 MMOL/L (ref 135–146)
TRIGL SERPL-MCNC: 122 MG/DL
TSH SERPL-ACNC: 1.74 MIU/L (ref 0.4–4.5)
WBC # BLD AUTO: 5.8 THOUSAND/UL (ref 3.8–10.8)

## 2023-11-02 ENCOUNTER — OFFICE VISIT (OUTPATIENT)
Dept: INTERNAL MEDICINE CLINIC | Facility: OTHER | Age: 58
End: 2023-11-02
Payer: COMMERCIAL

## 2023-11-02 VITALS
DIASTOLIC BLOOD PRESSURE: 78 MMHG | BODY MASS INDEX: 34.35 KG/M2 | TEMPERATURE: 97.8 F | HEART RATE: 86 BPM | WEIGHT: 245.4 LBS | SYSTOLIC BLOOD PRESSURE: 120 MMHG | HEIGHT: 71 IN | OXYGEN SATURATION: 97 %

## 2023-11-02 DIAGNOSIS — R73.03 PREDIABETES: ICD-10-CM

## 2023-11-02 DIAGNOSIS — M79.671 RIGHT FOOT PAIN: ICD-10-CM

## 2023-11-02 DIAGNOSIS — H91.93 DECREASED HEARING OF BOTH EARS: ICD-10-CM

## 2023-11-02 DIAGNOSIS — Z12.11 SCREENING FOR COLON CANCER: ICD-10-CM

## 2023-11-02 DIAGNOSIS — F32.A DEPRESSION, UNSPECIFIED DEPRESSION TYPE: ICD-10-CM

## 2023-11-02 DIAGNOSIS — G47.33 OSA (OBSTRUCTIVE SLEEP APNEA): ICD-10-CM

## 2023-11-02 DIAGNOSIS — Z23 ENCOUNTER FOR IMMUNIZATION: ICD-10-CM

## 2023-11-02 DIAGNOSIS — Z00.00 ANNUAL PHYSICAL EXAM: Primary | ICD-10-CM

## 2023-11-02 DIAGNOSIS — Z80.8 FAMILY HISTORY OF SKIN CANCER: ICD-10-CM

## 2023-11-02 DIAGNOSIS — E78.5 DYSLIPIDEMIA: ICD-10-CM

## 2023-11-02 PROCEDURE — 90471 IMMUNIZATION ADMIN: CPT

## 2023-11-02 PROCEDURE — 90686 IIV4 VACC NO PRSV 0.5 ML IM: CPT

## 2023-11-02 PROCEDURE — 90715 TDAP VACCINE 7 YRS/> IM: CPT

## 2023-11-02 PROCEDURE — 99213 OFFICE O/P EST LOW 20 MIN: CPT | Performed by: NURSE PRACTITIONER

## 2023-11-02 PROCEDURE — 90472 IMMUNIZATION ADMIN EACH ADD: CPT

## 2023-11-02 PROCEDURE — 99396 PREV VISIT EST AGE 40-64: CPT | Performed by: NURSE PRACTITIONER

## 2023-11-02 RX ORDER — BUPROPION HYDROCHLORIDE 150 MG/1
150 TABLET ORAL DAILY
Qty: 90 TABLET | Refills: 1 | Status: SHIPPED | OUTPATIENT
Start: 2023-11-02

## 2023-11-02 NOTE — PATIENT INSTRUCTIONS
Wellness Visit for Adults   AMBULATORY CARE:   A wellness visit  is when you see your healthcare provider to get screened for health problems. Your healthcare provider will also give you advice on how to stay healthy. Write down your questions so you remember to ask them. Ask your healthcare provider how often you should have a wellness visit. What happens at a wellness visit:  Your healthcare provider will ask about your health, and your family history of health problems. This includes high blood pressure, heart disease, and cancer. He or she will ask if you have symptoms that concern you, if you smoke, and about your mood. You may also be asked about your intake of medicines, supplements, food, and alcohol. Any of the following may be done: Your weight  will be checked. Your height may also be checked so your body mass index (BMI) can be calculated. Your BMI shows if you are at a healthy weight. Your blood pressure  and heart rate will be checked. Your temperature may also be checked. Blood and urine tests  may be done. Blood tests may be done to check your cholesterol levels. Abnormal cholesterol levels increase your risk for heart disease and stroke. You may also need a blood or urine test to check for diabetes if you are at increased risk. Urine tests may be done to look for signs of an infection or kidney disease. A physical exam  includes checking your heartbeat and lungs with a stethoscope. Your healthcare provider may also check your skin to look for sun damage. Screening tests  may be recommended. A screening test is done to check for diseases that may not cause symptoms. The screening tests you may need depend on your age, gender, family history, and lifestyle habits. For example, colorectal screening may be recommended if you are 48years old or older. Screening tests you need if you are a woman:   A Pap smear  is used to screen for cervical cancer.  Pap smears are usually done every 3 to 5 years depending on your age. You may need them more often if you have had abnormal Pap smear test results in the past. Ask your healthcare provider how often you should have a Pap smear. A mammogram  is an x-ray of your breasts to screen for breast cancer. Experts recommend mammograms every 2 years starting at age 48 years. You may need a mammogram at age 52 years or younger if you have an increased risk for breast cancer. Talk to your healthcare provider about when you should start having mammograms and how often you need them. Vaccines you may need:   Get an influenza vaccine  every year. The influenza vaccine protects you from the flu. Several types of viruses cause the flu. The viruses change over time, so new vaccines are made each year. Get a tetanus-diphtheria (Td) booster vaccine  every 10 years. This vaccine protects you against tetanus and diphtheria. Tetanus is a severe infection that may cause painful muscle spasms and lockjaw. Diphtheria is a severe bacterial infection that causes a thick covering in the back of your mouth and throat. Get a human papillomavirus (HPV) vaccine  if you are female and aged 23 to 32 or male 23 to 24 and never received it. This vaccine protects you from HPV infection. HPV is the most common infection spread by sexual contact. HPV may also cause vaginal, penile, and anal cancers. Get a pneumococcal vaccine  if you are aged 72 years or older. The pneumococcal vaccine is an injection given to protect you from pneumococcal disease. Pneumococcal disease is an infection caused by pneumococcal bacteria. The infection may cause pneumonia, meningitis, or an ear infection. Get a shingles vaccine  if you are 60 or older, even if you have had shingles before. The shingles vaccine is an injection to protect you from the varicella-zoster virus. This is the same virus that causes chickenpox.  Shingles is a painful rash that develops in people who had chickenpox or have been exposed to the virus. How to eat healthy:  My Plate is a model for planning healthy meals. It shows the types and amounts of foods that should go on your plate. Fruits and vegetables make up about half of your plate, and grains and protein make up the other half. A serving of dairy is included on the side of your plate. The amount of calories and serving sizes you need depends on your age, gender, weight, and height. Examples of healthy foods are listed below:  Eat a variety of vegetables  such as dark green, red, and orange vegetables. You can also include canned vegetables low in sodium (salt) and frozen vegetables without added butter or sauces. Eat a variety of fresh fruits , canned fruit in 100% juice, frozen fruit, and dried fruit. Include whole grains. At least half of the grains you eat should be whole grains. Examples include whole-wheat bread, wheat pasta, brown rice, and whole-grain cereals such as oatmeal.    Eat a variety of protein foods such as seafood (fish and shellfish), lean meat, and poultry without skin (turkey and chicken). Examples of lean meats include pork leg, shoulder, or tenderloin, and beef round, sirloin, tenderloin, and extra lean ground beef. Other protein foods include eggs and egg substitutes, beans, peas, soy products, nuts, and seeds. Choose low-fat dairy products such as skim or 1% milk or low-fat yogurt, cheese, and cottage cheese. Limit unhealthy fats  such as butter, hard margarine, and shortening. Exercise:  Exercise at least 30 minutes per day on most days of the week. Some examples of exercise include walking, biking, dancing, and swimming. You can also fit in more physical activity by taking the stairs instead of the elevator or parking farther away from stores. Include muscle strengthening activities 2 days each week. Regular exercise provides many health benefits.  It helps you manage your weight, and decreases your risk for type 2 diabetes, heart disease, stroke, and high blood pressure. Exercise can also help improve your mood. Ask your healthcare provider about the best exercise plan for you. General health and safety guidelines:   Do not smoke. Nicotine and other chemicals in cigarettes and cigars can cause lung damage. Ask your healthcare provider for information if you currently smoke and need help to quit. E-cigarettes or smokeless tobacco still contain nicotine. Talk to your healthcare provider before you use these products. Limit alcohol. A drink of alcohol is 12 ounces of beer, 5 ounces of wine, or 1½ ounces of liquor. Lose weight, if needed. Being overweight increases your risk of certain health conditions. These include heart disease, high blood pressure, type 2 diabetes, and certain types of cancer. Protect your skin. Do not sunbathe or use tanning beds. Use sunscreen with a SPF 15 or higher. Apply sunscreen at least 15 minutes before you go outside. Reapply sunscreen every 2 hours. Wear protective clothing, hats, and sunglasses when you are outside. Drive safely. Always wear your seatbelt. Make sure everyone in your car wears a seatbelt. A seatbelt can save your life if you are in an accident. Do not use your cell phone when you are driving. This could distract you and cause an accident. Pull over if you need to make a call or send a text message. Practice safe sex. Use latex condoms if are sexually active and have more than one partner. Your healthcare provider may recommend screening tests for sexually transmitted infections (STIs). Wear helmets, lifejackets, and protective gear. Always wear a helmet when you ride a bike or motorcycle, go skiing, or play sports that could cause a head injury. Wear protective equipment when you play sports. Wear a lifejacket when you are on a boat or doing water sports.     © Copyright Tamra Chand 2023 Information is for End User's use only and may not be sold, redistributed or otherwise used for commercial purposes. The above information is an  only. It is not intended as medical advice for individual conditions or treatments. Talk to your doctor, nurse or pharmacist before following any medical regimen to see if it is safe and effective for you.

## 2023-11-02 NOTE — ASSESSMENT & PLAN NOTE
- 62 yr old male  - labs reviewed during visit, discussed dietary modifications, continue routine exercise  - TDAP and influenza vaccines given today  - cologuard ordered

## 2023-11-02 NOTE — PROGRESS NOTES
[facility] [place]    NAME: Kerri Quiroga  AGE: 66 y.o. SEX: male  : 1965     DATE: 2023     Assessment and Plan:     Problem List Items Addressed This Visit       Depression     - mood controlled on wellbutrin and Effexor          Relevant Medications    buPROPion (WELLBUTRIN XL) 150 mg 24 hr tablet    Dyslipidemia     - lipids at goal  - continue atorvastatin 20mg daily, fish oil and ASA 81 mg         Relevant Orders    Lipid Panel with Direct LDL reflex    Comprehensive metabolic panel    Decreased hearing of both ears     - follow up in December with ENT/audiology          Prediabetes     -   - discussed dietary modifications   - continue routine exercise   - check Hba1c with next labs          Relevant Orders    Hemoglobin A1C    ELMER (obstructive sleep apnea)     - compliant with cpap          Annual physical exam     - 62 yr old male  - labs reviewed during visit, discussed dietary modifications, continue routine exercise  - TDAP and influenza vaccines given today  - cologuard ordered          Other Visit Diagnoses       Screening for colon cancer    -  Primary    Relevant Orders    Cologuard    Encounter for immunization        Relevant Orders    influenza vaccine, quadrivalent, 0.5 mL, preservative-free, for adult and pediatric patients 6 mos+ (AFLURIA, FLUARIX, FLULAVAL, FLUZONE) (Completed)    TDAP VACCINE GREATER THAN OR EQUAL TO 8YO IM (Completed)    Family history of skin cancer        Relevant Orders    Ambulatory Referral to Dermatology    Right foot pain        Relevant Orders    Ambulatory Referral to Podiatry            Immunizations and preventive care screenings were discussed with patient today. Appropriate education was printed on patient's after visit summary. Discussed risks and benefits of prostate cancer screening.  We discussed the controversial history of PSA screening for prostate cancer in the [facility] as well as the risk of over detection and over treatment of prostate cancer by way of PSA screening. The patient understands that PSA blood testing is an imperfect way to screen for prostate cancer and that elevated PSA levels in the blood may also be caused by infection, inflammation, prostatic trauma or manipulation, urological procedures, or by benign prostatic enlargement. The role of the digital rectal examination in prostate cancer screening was also discussed and I discussed with him that there is large interobserver variability in the findings of digital rectal examination. Return in about 6 months (around 5/2/2024). Chief Complaint:     Chief Complaint   Patient presents with    Physical Exam     Pt is here for a physical      History of Present Illness:     Adult Annual Physical   Patient here for a comprehensive physical exam. The patient reports no problems. Diet and Physical Activity  Diet/Nutrition: poor diet. High in carbs   Exercise: 5-7 times a week on average. Depression Screening  PHQ-2/9 Depression Screening           General Health  Sleep: sleeps well. Hearing: decreased - bilateral.  Vision: most recent eye exam >1 year ago and wears glasses. Dental: regular dental visits.  Health  Symptoms include: none       Review of Systems:     Review of Systems   Constitutional:  Negative for activity change, appetite change, chills, diaphoresis, fatigue, fever and unexpected weight change. Eyes:  Negative for visual disturbance. Respiratory:  Negative for cough, chest tightness, shortness of breath and wheezing. Cardiovascular:  Negative for chest pain, palpitations and leg swelling. Gastrointestinal:  Negative for abdominal pain, blood in stool, constipation, diarrhea, nausea and vomiting. Genitourinary:  Negative for difficulty urinating, frequency and hematuria. Skin:  Negative for rash.    Neurological:  Negative for dizziness, light-headedness and headaches. Psychiatric/Behavioral:  Negative for dysphoric mood and sleep disturbance. The patient is not nervous/anxious. Past Medical History:     Past Medical History:   Diagnosis Date    Acute suppurative otitis media of left ear without spontaneous rupture of tympanic membrane 2018    Anxiety     Depression     HL (hearing loss)     Hyperlipidemia     Hypertension     Obesity (BMI 35.0-39.9 without comorbidity) 2017      Past Surgical History:     Past Surgical History:   Procedure Laterality Date    HI OPEN TREATMENT METATARSAL FRACTURE EACH Right 2023    Procedure: OPEN REDUCTION W/ INTERNAL FIXATION (ORIF) FOOT, ORIF dupree fracture 5th met;  Surgeon: Chasidy Birch DPM;  Location: [facility];  Service: Podiatry    WISDOM TOOTH EXTRACTION        Family History:     Family History   Problem Relation Age of Onset    Diabetes Father         borderline    Hypothyroidism Father     Prostate cancer Father     Heart disease Father     Skin cancer Father     Dementia Father     Hypothyroidism Sister     Fibromyalgia Sister     Heart disease Sister     Ovarian cancer Mother     Skin cancer Mother       Social History:     Social History     Socioeconomic History    Marital status:      Spouse name: None    Number of children: None    Years of education: None    Highest education level: None   Occupational History    None   Tobacco Use    Smoking status: Former     Packs/day: 0.50     Years: 25.00     Total pack years: 12.50     Types: Cigarettes     Start date: 200     Quit date: 2015     Years since quittin.9    Smokeless tobacco: Never   Vaping Use    Vaping Use: Never used   Substance and Sexual Activity    Alcohol use:  Yes     Alcohol/week: 4.0 standard drinks of alcohol     Types: 4 Cans of beer per week    Drug use: Never    Sexual activity: Yes   Other Topics Concern    None   Social History Narrative    None     Social Determinants of Health     Financial Resource Strain: Not on file   Food Insecurity: Not on file   Transportation Needs: Not on file   Physical Activity: Not on file   Stress: Not on file   Social Connections: Not on file   Intimate Partner Violence: Not on file   Housing Stability: Not on file      Current Medications:     Current Outpatient Medications   Medication Sig Dispense Refill    aspirin 81 MG tablet Take 1 tablet by mouth daily      atorvastatin (LIPITOR) 20 mg tablet Take 1 tablet (20 mg total) by mouth daily 90 tablet 1    buPROPion (WELLBUTRIN XL) 150 mg 24 hr tablet Take 1 tablet (150 mg total) by mouth daily 90 tablet 1    co-enzyme Q-10 30 MG capsule Take 30 mg by mouth daily      Green Tea 150 MG CAPS Take by mouth daily      Multiple Vitamins-Minerals (MULTIVITAMIN ADULT) TABS Take 1 tablet by mouth daily      Omega-3 Fatty Acids ([employer]S FISH OIL) 1000 MG CAPS Take 2 capsules by mouth 2 (two) times a day      Turmeric Curcumin 500 MG CAPS Take 1 capsule by mouth daily      venlafaxine (EFFEXOR-XR) 150 mg 24 hr capsule Take 1 capsule (150 mg total) by mouth daily 90 capsule 1     No current facility-administered medications for this visit. Allergies:     No Known Allergies   Physical Exam:     /78 (BP Location: Left arm, Patient Position: Sitting, Cuff Size: Large)   Pulse 86   Temp 97.8 °F (36.6 °C) (Tympanic)   Ht 5' 11" (1.803 m)   Wt 111 kg (245 lb 6.4 oz)   SpO2 97% Comment: ra  BMI 34.23 kg/m²     Physical Exam  Vitals reviewed. Exam conducted with a chaperone present. Constitutional:       General: He is not in acute distress. Appearance: Normal appearance. He is obese. He is not diaphoretic. HENT:      Head: Normocephalic and atraumatic. Right Ear: Tympanic membrane and external ear normal.      Left Ear: Tympanic membrane and external ear normal.      Nose: Nose normal.      Mouth/Throat:      Mouth: Mucous membranes are moist.      Pharynx: Oropharynx is clear. No oropharyngeal exudate or posterior oropharyngeal erythema. Eyes:      Extraocular Movements: Extraocular movements intact. Conjunctiva/sclera: Conjunctivae normal.      Pupils: Pupils are equal, round, and reactive to light. Neck:      Vascular: No carotid bruit. Cardiovascular:      Rate and Rhythm: Normal rate and regular rhythm. Heart sounds: Normal heart sounds. No murmur heard. Pulmonary:      Effort: Pulmonary effort is normal. No respiratory distress. Breath sounds: Normal breath sounds. No wheezing, rhonchi or rales. Abdominal:      General: Bowel sounds are normal. There is no distension. Palpations: Abdomen is soft. There is no mass. Tenderness: There is no abdominal tenderness. There is no guarding. Hernia: There is no hernia in the left inguinal area or right inguinal area. Genitourinary:     Penis: Normal.       Testes: Normal.      Epididymis:      Right: Normal.      Left: Normal.      Prostate: Normal.      Rectum: Normal.   Musculoskeletal:      Right lower leg: No edema. Left lower leg: No edema. Lymphadenopathy:      Cervical: No cervical adenopathy. Skin:     General: Skin is warm and dry. Neurological:      Mental Status: He is alert and oriented to person, place, and time. Mental status is at baseline. Gait: Gait normal.   Psychiatric:         Mood and Affect: Mood normal.         Behavior: Behavior normal.         Thought Content:  Thought content normal.         Judgment: Judgment normal.          Anoop Concepcion, [address] [place] [address] PRIMARY CARE Clarence Vargas

## 2023-11-02 NOTE — PROGRESS NOTES
Assessment/Plan:    Problem List Items Addressed This Visit       Depression     - mood controlled on wellbutrin and Effexor          Relevant Medications    buPROPion (WELLBUTRIN XL) 150 mg 24 hr tablet    Dyslipidemia     - lipids at goal  - continue atorvastatin 20mg daily, fish oil and ASA 81 mg         Relevant Orders    Lipid Panel with Direct LDL reflex    Comprehensive metabolic panel    Decreased hearing of both ears     - follow up in December with ENT/audiology          Prediabetes     -   - discussed dietary modifications   - continue routine exercise   - check Hba1c with next labs          Relevant Orders    Hemoglobin A1C    ELMER (obstructive sleep apnea)     - compliant with cpap          Annual physical exam     - 62 yr old male  - labs reviewed during visit, discussed dietary modifications, continue routine exercise  - TDAP and influenza vaccines given today  - cologuard ordered          Other Visit Diagnoses       Screening for colon cancer    -  Primary    Relevant Orders    Cologuard    Encounter for immunization        Relevant Orders    influenza vaccine, quadrivalent, 0.5 mL, preservative-free, for adult and pediatric patients 6 mos+ (AFLURIA, FLUARIX, FLULAVAL, FLUZONE) (Completed)    TDAP VACCINE GREATER THAN OR EQUAL TO 8YO IM (Completed)    Family history of skin cancer        Relevant Orders    Ambulatory Referral to Dermatology    Right foot pain        Relevant Orders    Ambulatory Referral to Podiatry            BMI Counseling: Body mass index is 34.23 kg/m². Discussed the patient's BMI with him. The BMI is above normal. Nutrition recommendations include 3-5 servings of fruits/vegetables daily and moderation in carbohydrate intake. Exercise recommendations include moderate aerobic physical activity for 150 minutes/week. Springr software was used to dictate this note. It may contain errors with dictating incorrect words or incorrect spelling.  Please contact the provider directly with any questions. Subjective:      Patient ID: Isabel Marcano is a 66 y.o. male. HPI    Patient presents today for 6 month follow up  Labs completed 10/30/23  FBS elevated 116     HLD-  currently on atorvastatin 20mg daily, ASA 81mg and fish oil. Total cholesterol 155, HDL 61, triglycerides 122, LDL 74. Depression - currently on Effexor XR 150mg daily and Wellbutrin XL 150mg daily. Has been on this regimen for several years, mood is overall stable     The following portions of the patient's history were reviewed and updated as appropriate: allergies, current medications, past family history, past medical history, past social history, past surgical history, and problem list.    Review of Systems   Constitutional:  Negative for chills, fever and unexpected weight change. Eyes:  Negative for visual disturbance. Respiratory:  Negative for cough, chest tightness and shortness of breath. Cardiovascular:  Negative for chest pain and leg swelling. Neurological:  Negative for headaches.          Past Medical History:   Diagnosis Date    Acute suppurative otitis media of left ear without spontaneous rupture of tympanic membrane 01/22/2018    Anxiety     Depression     HL (hearing loss)     Hyperlipidemia     Hypertension     Obesity (BMI 35.0-39.9 without comorbidity) 01/16/2017         Current Outpatient Medications:     aspirin 81 MG tablet, Take 1 tablet by mouth daily, Disp: , Rfl:     atorvastatin (LIPITOR) 20 mg tablet, Take 1 tablet (20 mg total) by mouth daily, Disp: 90 tablet, Rfl: 1    buPROPion (WELLBUTRIN XL) 150 mg 24 hr tablet, Take 1 tablet (150 mg total) by mouth daily, Disp: 90 tablet, Rfl: 1    co-enzyme Q-10 30 MG capsule, Take 30 mg by mouth daily, Disp: , Rfl:     Green Tea 150 MG CAPS, Take by mouth daily, Disp: , Rfl:     Multiple Vitamins-Minerals (MULTIVITAMIN ADULT) TABS, Take 1 tablet by mouth daily, Disp: , Rfl:     Omega-3 Fatty Acids ([employer]S FISH OIL) 1000 MG CAPS, Take 2 capsules by mouth 2 (two) times a day, Disp: , Rfl:     Turmeric Curcumin 500 MG CAPS, Take 1 capsule by mouth daily, Disp: , Rfl:     venlafaxine (EFFEXOR-XR) 150 mg 24 hr capsule, Take 1 capsule (150 mg total) by mouth daily, Disp: 90 capsule, Rfl: 1    No Known Allergies    Social History   Past Surgical History:   Procedure Laterality Date    VT OPEN TREATMENT METATARSAL FRACTURE EACH Right 1/20/2023    Procedure: OPEN REDUCTION W/ INTERNAL FIXATION (ORIF) FOOT, ORIF dupree fracture 5th met;  Surgeon: Aiden Mix DPM;  Location: [facility];  Service: Podiatry    WISDOM TOOTH EXTRACTION       Family History   Problem Relation Age of Onset    Diabetes Father         borderline    Hypothyroidism Father     Prostate cancer Father     Heart disease Father     Skin cancer Father     Dementia Father     Hypothyroidism Sister     Fibromyalgia Sister     Heart disease Sister     Ovarian cancer Mother     Skin cancer Mother        Objective:  /78 (BP Location: Left arm, Patient Position: Sitting, Cuff Size: Large)   Pulse 86   Temp 97.8 °F (36.6 °C) (Tympanic)   Ht 5' 11" (1.803 m)   Wt 111 kg (245 lb 6.4 oz)   SpO2 97% Comment: ra  BMI 34.23 kg/m²      Physical Exam  Vitals reviewed. Constitutional:       General: He is not in acute distress. Appearance: Normal appearance. He is obese. He is not diaphoretic. HENT:      Head: Normocephalic and atraumatic. Eyes:      Extraocular Movements: Extraocular movements intact. Conjunctiva/sclera: Conjunctivae normal.      Pupils: Pupils are equal, round, and reactive to light. Cardiovascular:      Rate and Rhythm: Normal rate and regular rhythm. Heart sounds: Normal heart sounds. No murmur heard. Pulmonary:      Effort: Pulmonary effort is normal. No respiratory distress. Breath sounds: Normal breath sounds. No wheezing, rhonchi or rales. Musculoskeletal:      Right lower leg: No edema. Left lower leg: No edema. Skin:     General: Skin is warm and dry. Neurological:      Mental Status: He is alert. Mental status is at baseline.    Psychiatric:         Mood and Affect: Mood normal.         Behavior: Behavior normal.

## 2023-11-03 NOTE — PROGRESS NOTES
Assessment   1  Flu-like symptoms (780 99) (R68 89)   2  Acute suppurative otitis media of left ear without spontaneous rupture of tympanic     membrane, recurrence not specified (382 00) (H66 002)    Plan   Acute suppurative otitis media of left ear without spontaneous rupture of tympanic    membrane, recurrence not specified    · Azithromycin 250 MG Oral Tablet; TAKE 2 TABLETS ON DAY 1 THEN TAKE 1    TABLET A DAY FOR 4 DAYS    Discussion/Summary      Start the antibiotic for ear infection  is likely the flu, however it is outside the 72 hour window for tamiflu  ibuprofen and tylenol alternating every 4 hours  Call if fevers are not controlled with tylenol/motrin plenty of fluids and get rest   nasal spray to open sinuses  Mucinex DM to thin secretions and help with cough  if not improving or worsening  Go to the ER if fevers go above 103 5 and can't be lowered with medication, you have chest pain, shortness of breath, or other acute emergent concerns  The patient was counseled regarding diagnostic results,-- instructions for management,-- risk factor reductions,-- prognosis,-- patient and family education,-- impressions,-- risks and benefits of treatment options,-- importance of compliance with treatment  Possible side effects of new medications were reviewed with the patient/guardian today  The treatment plan was reviewed with the patient/guardian  The patient/guardian understands and agrees with the treatment plan      Chief Complaint   PThere c/o flu like symptoms  PT stated started last Friday  stated having Body aches/chills, PT stated he is always fatigued, coughing, slight sore throat, and slight sinus and chest congestion  stated had a fever Saturday and also has fever today      History of Present Illness   HPI: Patient presents for an acute visit  He reports that for the past 3 5 days, he has been having body aches and chills, fatigue, cough, sore throat, sinus and chest congestion   He has a fever of 103 at present  He took ibuprofen this morning at 6 am this morning  He reports that he was not aware that he had such a fever at this time  He denies shortness of breath, but does report that it is not as easy as it normally is  The patient did not get a flu shot this year  He denies known sick exposures  denies N/V/D, chest pain, or rashes  Review of Systems        Constitutional: fever,-- feeling poorly,-- chills-- and-- feeling tired, but-- as noted in HPI       ENT: sore throat-- and-- nasal discharge, but-- no earache,-- no nosebleeds,-- no hearing loss-- and-- no hoarseness  Cardiovascular: no chest pain-- and-- no palpitations  Respiratory: cough-- and-- PND, but-- as noted in HPI,-- no shortness of breath-- and-- no wheezing  Gastrointestinal: no complaints of abdominal pain, no constipation, no nausea or vomiting, no diarrhea or bloody stools  Integumentary: no complaints of skin rash or lesion, no itching or dry skin, no skin wounds  Neurological: headache, but-- no numbness,-- no tingling,-- no confusion,-- no dizziness-- and-- no fainting  Active Problems   1  Acute pharyngitis, unspecified etiology (462) (J02 9)   2  Bilirubinemia (782 4) (E80 6)   3  Depression (311) (F32 9)   4  Dyslipidemia (272 4) (E78 5)   5  Impaired fasting glucose (790 21) (R73 01)   6  Obesity (BMI 35 0-39 9 without comorbidity) (278 00) (E66 9)   7  Screening PSA (prostate specific antigen) (V76 44) (Z12 5)   8  Snoring (786 09) (R06 83)    Past Medical History   Active Problems And Past Medical History Reviewed: The active problems and past medical history were reviewed and updated today  Surgical History   Surgical History Reviewed: The surgical history was reviewed and updated today         Social History    · Former smoker (Y61 12) (A63 078)   · Quit smoking 12/2015   · Denied: History of No drug use   · Social alcohol use (Z78 9)  The social history was reviewed Fever, tachycardia, with MARY ELLEN/hypotension. CT A/P outside records showing evidence of pyelonephritis in the upper poles. Still intermittently febrile. S/p 1 dose of zosyn, vanc. Immunosuppression from active chemo.  - Continue zosyn for pyelonephritis  - Received 2L NS, however given hyponatremia will need serum osm, urine lytes prior to continuing with maintenance fluids. Possible SIADH iso pain and illness, but possibly appropriate adh release iso poor PO intake. Will need to f/u labs and updated today  The social history was reviewed and is unchanged  Family History   Family History Reviewed: The family history was reviewed and updated today  Current Meds    1  Aspirin 81 MG TABS; Therapy: (OLROMTQW:[id number]) to Recorded   2  Atorvastatin Calcium 20 MG Oral Tablet; TAKE 1 TABLET DAILY  Requested for:     [id number]; Last Rx:18Oct2017 Ordered   3  BuPROPion HCl ER (XL) 300 MG Oral Tablet Extended Release 24 Hour; TAKE 1     TABLET DAILY  Requested for: [id number]; Last Rx:18Oct2017 Ordered   4  CVS Fish Oil 1000 MG Oral Capsule; TAKE 2 CAPSULE Twice daily; Last Rx:28Nov2016     Ordered   5  Magnesium TABS; Therapy: (Recorded:28Khm8421) to Recorded   6  Multivitamin TABS; Therapy: (VTRCLDNE:25JHC3076) to Recorded   7  Turmeric Curcumin Oral Capsule; Therapy: (Recorded:33Tqu5483) to Recorded   8  Venlafaxine HCl  MG Oral Capsule Extended Release 24 Hour; TAKE 1 CAPSULE     DAILY; Therapy: [id number] to (Evaluate:86Sbt0044)  Requested for: 80Rxd7844; Last     Rx:[id number] Ordered     The medication list was reviewed and updated today  Allergies   1  No Known Drug Allergies  2  No Known Environmental Allergies   3  No Known Food Allergies    Vitals    Recorded: [id number] 10:51AM   Temperature 103 1 F, Oral   Heart Rate 379   Systolic 570, LUE, Sitting   Diastolic 98, LUE, Sitting   Height 5 ft 11 in   Weight 289 lb 2 oz   BMI Calculated 40 32   BSA Calculated 2 47   O2 Saturation 98, RA     Physical Exam        Constitutional      General appearance: Abnormal   acutely ill  Eyes      Conjunctiva and lids: No swelling, erythema, or discharge  Pupils and irises: Equal, round and reactive to light  Ears, Nose, Mouth, and Throat      External inspection of ears and nose: Normal        Otoscopic examination: Abnormal   The right tympanic membrane was not red-- and-- was not bulging  The left tympanic membrane was red-- and-- was bulging  Oropharynx: Normal with no erythema, edema, exudate or lesions  Pulmonary      Respiratory effort: No increased work of breathing or signs of respiratory distress  Auscultation of lungs: Clear to auscultation, equal breath sounds bilaterally, no wheezes, no rales, no rhonci  Cardiovascular      Auscultation of heart: Normal rate and rhythm, normal S1 and S2, without murmurs  Examination of extremities for edema and/or varicosities: Normal        Lymphatic      Palpation of lymph nodes in neck: Abnormal   bilateral anterior cervical node enlargement  Musculoskeletal      Gait and station: Normal        Skin      Skin and subcutaneous tissue: Normal without rashes or lesions         Psychiatric      Orientation to person, place and time: Normal        Mood and affect: Normal           Signatures    Electronically signed by : Brady Elias; Jan 22 2018 11:26AM EST                       (Author)     Electronically signed by : Sandra Canchola DO; Jan 22 2018  5:21PM EST Fever, tachycardia, with MARY ELLEN/hypotension. CT A/P outside records showing evidence of pyelonephritis in the upper poles. Still intermittently febrile. S/p 1 dose of zosyn, vanc. Immunosuppression from active chemo.  - bcx speciating with E coli. Switching from zosyn to CTX for pyelonephritis  - Ok to continue fluids. Hyponatremia likely iso hypovolemia.

## 2023-11-29 LAB — COLOGUARD RESULT REPORTABLE: NEGATIVE

## 2023-12-01 ENCOUNTER — OFFICE VISIT (OUTPATIENT)
Dept: PODIATRY | Facility: CLINIC | Age: 58
End: 2023-12-01
Payer: COMMERCIAL

## 2023-12-01 ENCOUNTER — APPOINTMENT (OUTPATIENT)
Dept: RADIOLOGY | Facility: CLINIC | Age: 58
End: 2023-12-01
Payer: COMMERCIAL

## 2023-12-01 VITALS
HEIGHT: 71 IN | WEIGHT: 251 LBS | DIASTOLIC BLOOD PRESSURE: 81 MMHG | SYSTOLIC BLOOD PRESSURE: 143 MMHG | BODY MASS INDEX: 35.14 KG/M2 | HEART RATE: 76 BPM

## 2023-12-01 DIAGNOSIS — M79.671 RIGHT FOOT PAIN: ICD-10-CM

## 2023-12-01 DIAGNOSIS — Q66.221 METATARSUS ADDUCTUS OF RIGHT FOOT: ICD-10-CM

## 2023-12-01 DIAGNOSIS — D36.10 NEUROMA: Primary | ICD-10-CM

## 2023-12-01 PROCEDURE — 99213 OFFICE O/P EST LOW 20 MIN: CPT | Performed by: PODIATRIST

## 2023-12-01 PROCEDURE — 73630 X-RAY EXAM OF FOOT: CPT

## 2023-12-01 PROCEDURE — 64455 NJX AA&/STRD PLTR COM DG NRV: CPT | Performed by: PODIATRIST

## 2023-12-01 RX ORDER — TRIAMCINOLONE ACETONIDE 40 MG/ML
20 INJECTION, SUSPENSION INTRA-ARTICULAR; INTRAMUSCULAR ONCE
Status: COMPLETED | OUTPATIENT
Start: 2023-12-01 | End: 2023-12-05

## 2023-12-01 RX ORDER — LIDOCAINE HYDROCHLORIDE 10 MG/ML
0.5 INJECTION, SOLUTION EPIDURAL; INFILTRATION; INTRACAUDAL; PERINEURAL ONCE
Status: COMPLETED | OUTPATIENT
Start: 2023-12-01 | End: 2023-12-05

## 2023-12-01 NOTE — PROGRESS NOTES
Assessment/Plan:    No problem-specific Assessment & Plan notes found for this encounter. Diagnoses and all orders for this visit:    Neuroma    Right foot pain  -     Ambulatory Referral to Podiatry  -     X-ray foot right 3+ views; Future    Metatarsus adductus of right foot      -X-rays 3 views taken reviewed of the right foot and do show intact screw with no evidence of loosening, there is still continued fracture line  - No evidence of new fracture no evidence of dislocation, continued chronic foot change with severe metatarsus adductus  - He has been very compliant with his orthotic usage and supportive shoes, unfortunately I think he is still having a component of lateral column overload which is leading to some inflammation around the digital nerve at this point  - We will trial an injection for both a diagnostic and therapeutic fashion  - he is to return in 6 feet weeks for repeat assessment of his pain    Nerve block    Date/Time: 12/1/2023 7:45 AM    Performed by: Karin Gonzalez DPM  Authorized by: Karin Gonzalez DPM    Patient location:  [facility]  [place] Protocol:  Consent: Verbal consent obtained. Risks and benefits: risks, benefits and alternatives were discussed  Consent given by: patient  Patient understanding: patient states understanding of the procedure being performed  Patient consent: the patient's understanding of the procedure matches consent given  Patient identity confirmed: verbally with patient    Indications:     Indications:  Pain relief  Location:     Body area:  Lower extremity    Lower extremity nerve:  Plantar    Nerve type:  Peripheral    Laterality:  Right  Pre-procedure details:     Skin preparation:  Alcohol  Skin anesthesia (see MAR for exact dosages):     Skin anesthesia method:  Topical application  Post-procedure details:     Dressing:  Sterile dressing    Outcome:  Anesthesia achieved    Patient tolerance of procedure:   Tolerated well, no immediate complications        Subjective:      Patient ID: Catracho Doran is a 66 y.o. male. Patient presents for evaluation management of right foot, previous Hoang fracture repair, no pain at the actual surgical site. He did get orthotics in April but a couple days before he got the orthotics he turned his foot and is having some significant pain and he points to the right fourth interspace. This has been present on and off since April and he does notice it worse in close toed shoes and it is a stabbing pain that is persistent and then goes away. Worse through the day and not worse at night        The following portions of the patient's history were reviewed and updated as appropriate: allergies, current medications, past family history, past medical history, past social history, past surgical history, and problem list.    Review of Systems   Constitutional:  Negative for chills and fever. HENT:  Negative for ear pain and sore throat. Eyes:  Negative for pain and visual disturbance. Respiratory:  Negative for cough and shortness of breath. Cardiovascular:  Negative for chest pain and palpitations. Gastrointestinal:  Negative for abdominal pain and vomiting. Genitourinary:  Negative for dysuria and hematuria. Musculoskeletal:  Negative for arthralgias and back pain. Skin:  Negative for color change and rash. Neurological:  Negative for seizures and syncope. All other systems reviewed and are negative. Objective:      /81 (BP Location: Left arm, Patient Position: Sitting, Cuff Size: Standard)   Pulse 76   Ht 5' 11" (1.803 m)   Wt 114 kg (251 lb)   BMI 35.01 kg/m²          Physical Exam  Vitals reviewed. Constitutional:       Appearance: Normal appearance. HENT:      Head: Normocephalic and atraumatic. Nose: Nose normal.      Mouth/Throat:      Mouth: Mucous membranes are moist.   Eyes:      Pupils: Pupils are equal, round, and reactive to light.    Pulmonary: Effort: Pulmonary effort is normal.   Musculoskeletal:      Comments: No pain palpation of the actual Hoang site, significant metatarsus adductus chronic change, mild swelling no pain palpation Hoang site there is pain with palpation in the right fourth interspace with positive distal paresthesias   Skin:     Capillary Refill: Capillary refill takes 2 to 3 seconds. Neurological:      General: No focal deficit present. Mental Status: He is alert and oriented to person, place, and time. Mental status is at baseline.

## 2023-12-05 RX ADMIN — TRIAMCINOLONE ACETONIDE 20 MG: 40 INJECTION, SUSPENSION INTRA-ARTICULAR; INTRAMUSCULAR at 08:26

## 2023-12-05 RX ADMIN — LIDOCAINE HYDROCHLORIDE 0.5 ML: 10 INJECTION, SOLUTION EPIDURAL; INFILTRATION; INTRACAUDAL; PERINEURAL at 08:25

## 2024-01-08 DIAGNOSIS — E78.5 DYSLIPIDEMIA: ICD-10-CM

## 2024-01-08 DIAGNOSIS — F32.A DEPRESSION, UNSPECIFIED DEPRESSION TYPE: ICD-10-CM

## 2024-01-08 RX ORDER — BUPROPION HYDROCHLORIDE 150 MG/1
150 TABLET ORAL DAILY
Qty: 90 TABLET | Refills: 0 | OUTPATIENT
Start: 2024-01-08

## 2024-01-08 RX ORDER — VENLAFAXINE HYDROCHLORIDE 150 MG/1
150 CAPSULE, EXTENDED RELEASE ORAL DAILY
Qty: 90 CAPSULE | Refills: 1 | Status: SHIPPED | OUTPATIENT
Start: 2024-01-08

## 2024-01-08 RX ORDER — ATORVASTATIN CALCIUM 20 MG/1
20 TABLET, FILM COATED ORAL DAILY
Qty: 90 TABLET | Refills: 1 | Status: SHIPPED | OUTPATIENT
Start: 2024-01-08

## 2024-01-10 ENCOUNTER — APPOINTMENT (EMERGENCY)
Dept: RADIOLOGY | Facility: HOSPITAL | Age: 59
End: 2024-01-10
Payer: COMMERCIAL

## 2024-01-10 ENCOUNTER — HOSPITAL ENCOUNTER (EMERGENCY)
Facility: HOSPITAL | Age: 59
Discharge: HOME | End: 2024-01-11
Attending: EMERGENCY MEDICINE
Payer: COMMERCIAL

## 2024-01-10 VITALS
SYSTOLIC BLOOD PRESSURE: 153 MMHG | RESPIRATION RATE: 22 BRPM | DIASTOLIC BLOOD PRESSURE: 99 MMHG | OXYGEN SATURATION: 96 % | TEMPERATURE: 97.2 F | HEART RATE: 84 BPM

## 2024-01-10 DIAGNOSIS — M25.562 LEFT KNEE PAIN: Primary | ICD-10-CM

## 2024-01-10 PROCEDURE — 73564 X-RAY EXAM KNEE 4 OR MORE: CPT

## 2024-01-10 PROCEDURE — 99284 EMERGENCY DEPT VISIT MOD MDM: CPT | Performed by: EMERGENCY MEDICINE

## 2024-01-10 PROCEDURE — 99283 EMERGENCY DEPT VISIT LOW MDM: CPT

## 2024-01-10 RX ORDER — NAPROXEN 500 MG/1
500 TABLET ORAL ONCE
Status: COMPLETED | OUTPATIENT
Start: 2024-01-10 | End: 2024-01-10

## 2024-01-10 RX ORDER — NAPROXEN 500 MG/1
500 TABLET ORAL 2 TIMES DAILY WITH MEALS
Qty: 30 TABLET | Refills: 0 | Status: SHIPPED | OUTPATIENT
Start: 2024-01-10

## 2024-01-10 RX ADMIN — NAPROXEN 500 MG: 500 TABLET ORAL at 23:15

## 2024-01-10 NOTE — Clinical Note
Prasad Langley was seen and treated in our emergency department on 1/10/2024.                Diagnosis:     Prasad  .    He may return on this date: 01/12/2024         If you have any questions or concerns, please don't hesitate to call.      Francis Mckeon MD    ______________________________           _______________          _______________  Hospital Representative                              Date                                Time

## 2024-01-11 NOTE — ED PROVIDER NOTES
History  Chief Complaint   Patient presents with    Knee Pain     Pt c/o of knee pain that was occurring during work. Pt states that the pain worsened throughout the day. Pt also states that both knees are swollen.      This is a 58-year-old male who is presenting today with a chief concern of left knee pain.  Patient reports that he does not get intermittent knee pain while he is working and using his left knee more than typical.  He has never been diagnosed with any specific pathologies of his joints or knees.  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  He was working today and was experiencing a similar to normal ache in his bilateral knees with left worse than right.  However as he got home tonight he noted that his pain continued to worsen.  He notes now that he is having pain with range of motion especially extension and external rotation of the left lower extremity.  It is mildly tender whenever he bears weight as well.  He does believe that the knee is slightly more swollen than usual but he denies any redness, warmth, or overlying skin change of any kind.  He does not believe he has had any recent trauma but does think that he may have bumped it on something while at work.  He does note that he gets an aching sensation sometimes in his right knee but that is not bothering him currently and has never been as bad as the left knee is tonight.  He is denying any fevers, chills, nausea, vomiting, night sweats or any other systemic symptoms at this time.  He has not taken anything to alleviate his symptoms.        Prior to Admission Medications   Prescriptions Last Dose Informant Patient Reported? Taking?   Green Tea 150 MG CAPS  Self Yes No   Sig: Take by mouth daily   Multiple Vitamins-Minerals (MULTIVITAMIN ADULT) TABS  Self Yes No   Sig: Take 1 tablet by mouth daily   Omega-3 Fatty Acids ([employer] FISH OIL) 1000 MG CAPS  Self Yes No   Sig: Take 2 capsules by mouth  2 (two) times a day   Turmeric Curcumin 500 MG CAPS  Self Yes No   Sig: Take 1 capsule by mouth daily   aspirin 81 MG tablet  Self Yes No   Sig: Take 1 tablet by mouth daily   atorvastatin (LIPITOR) 20 mg tablet   No No   Sig: Take 1 tablet (20 mg total) by mouth daily   buPROPion (WELLBUTRIN XL) 150 mg 24 hr tablet   No No   Sig: Take 1 tablet (150 mg total) by mouth daily   co-enzyme Q-10 30 MG capsule  Self Yes No   Sig: Take 30 mg by mouth daily   venlafaxine (EFFEXOR-XR) 150 mg 24 hr capsule   No No   Sig: Take 1 capsule (150 mg total) by mouth daily      Facility-Administered Medications: None       Past Medical History:   Diagnosis Date    Acute suppurative otitis media of left ear without spontaneous rupture of tympanic membrane 01/22/2018    Anxiety     Depression     HL (hearing loss)     Hyperlipidemia     Hypertension     Obesity (BMI 35.0-39.9 without comorbidity) 01/16/2017       Past Surgical History:   Procedure Laterality Date    MD OPEN TREATMENT METATARSAL FRACTURE EACH Right 1/20/2023    Procedure: OPEN REDUCTION W/ INTERNAL FIXATION (ORIF) FOOT, ORIF dupree fracture 5th met;  Surgeon: Pramod Nuñez DPM;  Location: [facility];  Service: Podiatry    WISDOM TOOTH EXTRACTION         Family History   Problem Relation Age of Onset    Diabetes Father         borderline    Hypothyroidism Father     Prostate cancer Father     Heart disease Father     Skin cancer Father     Dementia Father     Hypothyroidism Sister     Fibromyalgia Sister     Heart disease Sister     Ovarian cancer Mother     Skin cancer Mother      I have reviewed and agree with the history as documented.    E-Cigarette/Vaping    E-Cigarette Use Never User      E-Cigarette/Vaping Substances    Nicotine No     THC No     CBD No     Flavoring No     Other No     Unknown No      Social History     Tobacco Use    Smoking status: Former     Current packs/day: 0.00     Average packs/day: 0.5 packs/day for 25.9 years (13.0 ttl  pk-yrs)     Types: Cigarettes     Start date:      Quit date: 2015     Years since quittin.1    Smokeless tobacco: Never   Vaping Use    Vaping status: Never Used   Substance Use Topics    Alcohol use: Yes     Alcohol/week: 4.0 standard drinks of alcohol     Types: 4 Cans of beer per week    Drug use: Never        Review of Systems   Constitutional:  Negative for chills, fatigue and fever.   HENT:  Negative for congestion and sore throat.    Eyes:  Negative for pain and visual disturbance.   Respiratory:  Negative for cough, chest tightness and shortness of breath.    Cardiovascular:  Negative for chest pain and palpitations.   Gastrointestinal:  Negative for abdominal pain, blood in stool, constipation, diarrhea, nausea and vomiting.   Genitourinary:  Negative for dysuria, flank pain and hematuria.   Musculoskeletal:  Positive for arthralgias and joint swelling. Negative for back pain and neck pain.   Skin:  Negative for color change and rash.   Neurological:  Negative for dizziness, syncope and light-headedness.   Hematological:  Negative for adenopathy. Does not bruise/bleed easily.   All other systems reviewed and are negative.      Physical Exam  ED Triage Vitals   Temperature Pulse Respirations Blood Pressure SpO2   01/10/24 2239 01/10/24 2239 01/10/24 2239 01/10/24 2239 01/10/24 2239   (!) 97.2 °F (36.2 °C) 84 22 153/99 96 %      Temp Source Heart Rate Source Patient Position - Orthostatic VS BP Location FiO2 (%)   01/10/24 2239 -- 01/10/24 2239 01/10/24 2239 --   Oral  Sitting Right arm       Pain Score       01/10/24 2315       8             Orthostatic Vital Signs  Vitals:    01/10/24 2239   BP: 153/99   Pulse: 84   Patient Position - Orthostatic VS: Sitting       Physical Exam  Vitals and nursing note reviewed.   Constitutional:       General: He is not in acute distress.     Appearance: He is well-developed. He is obese. He is not toxic-appearing or diaphoretic.   HENT:      Head:  Normocephalic and atraumatic.      Right Ear: External ear normal.      Left Ear: External ear normal.      Nose: Nose normal. No congestion or rhinorrhea.      Mouth/Throat:      Mouth: Mucous membranes are moist.      Pharynx: No oropharyngeal exudate or posterior oropharyngeal erythema.   Eyes:      General: No scleral icterus.     Extraocular Movements: Extraocular movements intact.      Conjunctiva/sclera: Conjunctivae normal.      Pupils: Pupils are equal, round, and reactive to light.   Cardiovascular:      Rate and Rhythm: Normal rate and regular rhythm.      Pulses: Normal pulses.      Heart sounds: No murmur heard.  Pulmonary:      Effort: Pulmonary effort is normal. No respiratory distress.      Breath sounds: Normal breath sounds. No wheezing or rales.   Abdominal:      Palpations: Abdomen is soft. There is no mass.      Tenderness: There is no abdominal tenderness. There is no right CVA tenderness, left CVA tenderness or guarding.      Hernia: No hernia is present.   Musculoskeletal:         General: Tenderness present. No swelling. Normal range of motion.      Cervical back: Normal range of motion and neck supple.      Left knee: No swelling or ecchymosis. Normal range of motion. Tenderness present over the patellar tendon. No LCL laxity, MCL laxity, ACL laxity or PCL laxity.Normal meniscus and normal patellar mobility. Normal pulse.      Instability Tests: Anterior drawer test negative. Posterior drawer test negative.      Right lower leg: No edema.      Left lower leg: No edema.      Comments: Area of maximal tenderness is just inferior to patella and patellar ligament on lateral aspect of left knee.  No medial joint line or lateral joint line tenderness appreciated otherwise, no significant pain, clicks, or laxity noted on passive ROM testing.  No pain with axial loading.   Skin:     General: Skin is warm and dry.      Capillary Refill: Capillary refill takes less than 2 seconds.   Neurological:       General: No focal deficit present.      Mental Status: He is alert and oriented to person, place, and time.   Psychiatric:         Mood and Affect: Mood normal.         Behavior: Behavior normal.         ED Medications  Medications   naproxen (NAPROSYN) tablet 500 mg (500 mg Oral Given 1/10/24 0451)       Diagnostic Studies  Results Reviewed       None                   XR knee 4+ vw left injury   ED Interpretation by Francis Mckeon MD (01/10 4556)   No acute osseous abnormalities however there is some chronic degenerative changes noted            Procedures  Procedures      ED Course                             SBIRT 22yo+      Flowsheet Row Most Recent Value   Initial Alcohol Screen: US AUDIT-C     1. How often do you have a drink containing alcohol? 0 Filed at: 01/10/2024 2334   2. How many drinks containing alcohol do you have on a typical day you are drinking?  0 Filed at: 01/10/2024 2334   3a. Male UNDER 65: How often do you have five or more drinks on one occasion? 0 Filed at: 01/10/2024 2334   Audit-C Score 0 Filed at: 01/10/2024 2334   EMMANUEL: How many times in the past year have you...    Used an illegal drug or used a prescription medication for non-medical reasons? Never Filed at: 01/10/2024 2334                  Medical Decision Making  Medical complexity: This 58-year-old male presenting with atraumatic joint pain and subjective swelling.  I do not appreciate significant swelling on examination, he does not have a red, hot, tender joint.  He is not having any fevers or chills that would be more suspicious for a septic arthritis, or other crystal apathy.  At this time, I suspect that patient has chronic degenerative disease of the left knee which is currently inflamed.  Will advise anti-inflammatories, provide prescription for naproxen, and will image today as patient has no imaging performed of his left knee in the past.  Then will refer to orthopedic specialist.  Will offer pressure dressing for  support.    Reassessment/disposition: I see no acute osseous abnormalities on wet read of left knee x-ray.  Patient does have small effusion of left knee on reexamination.  Ace bandage placed over knee just for pressure and comfort.  Patient was tolerant of first dose of naproxen here in the emergency department, prescription sent to pharmacy.  He understands the plan to follow-up with orthopedic specialist and will rest the knee for the next 24 hours.  He will come back to the emergency department if there is a red, hot, swollen joint or concern for fevers or severe pain with range of motion.  Otherwise will follow-up with PCP and orthopedics as described above.    Amount and/or Complexity of Data Reviewed  Radiology: ordered and independent interpretation performed.    Risk  Prescription drug management.          Disposition  Final diagnoses:   Left knee pain     Time reflects when diagnosis was documented in both MDM as applicable and the Disposition within this note       Time User Action Codes Description Comment    1/10/2024 11:13 PM Francis Mckeon Add [M25.562] Left knee pain           ED Disposition       ED Disposition   Discharge    Condition   Stable    Date/Time   Wed Adebayo 10, 2024 0736    Comment   Prasad Langley discharge to home/self care.                   Follow-up Information       Follow up With Specialties Details Why Contact Info Additional Information    Luis Luis PA-C Internal Medicine, Physician Assistant   [address]  [facility] [address] [phone]       [facility] Emergency Department Emergency Medicine Go to  If symptoms worsen, As needed [address] [facility] [address] [phone] [facility] Emergency [facility], [address] [place], [place], [address]   [phone]    [facility]   [address] [place] [address]  [id number] [phone] [facility] [place], [address] [place] [address], [place], [place], [id number] [phone]            Discharge Medication List as of 1/10/2024 11:51 PM        START taking these medications    Details   naproxen (Naprosyn) 500 mg tablet Take 1 tablet (500 mg total) by mouth 2 (two) times a day with meals, Starting Wed 1/10/2024, Normal           CONTINUE these medications which have NOT CHANGED    Details   aspirin 81 MG tablet Take 1 tablet by mouth daily, Historical Med      atorvastatin (LIPITOR) 20 mg tablet Take 1 tablet (20 mg total) by mouth daily, Starting Mon 1/8/2024, Normal      buPROPion (WELLBUTRIN XL) 150 mg 24 hr tablet Take 1 tablet (150 mg total) by mouth daily, Starting Th 11/2/2023, Normal      co-enzyme Q-10 30 MG capsule Take 30 mg by mouth daily, Historical Med      Green Tea 150 MG CAPS Take by mouth daily, Historical Med      Multiple Vitamins-Minerals (MULTIVITAMIN ADULT) TABS Take 1 tablet by mouth daily, Historical Med      Omega-3 Fatty Acids ([employer] FISH OIL) 1000 MG CAPS Take 2 capsules by mouth 2 (two) times a day, Historical Med      Turmeric Curcumin 500 MG CAPS Take 1 capsule by mouth daily, Historical Med      venlafaxine (EFFEXOR-XR) 150 mg 24 hr capsule Take 1 capsule (150 mg total) by mouth daily, Starting Mon 1/8/2024, Normal               PDMP Review         Value Time User    PDMP Reviewed  Yes 1/20/2023 12:20 PM Luis Saenz DPM             ED Provider  Attending physically available and evaluated Prasad Langley. I managed the patient along with the ED Attending.    Electronically Signed by           Francis Mckeon MD  01/11/24 1002

## 2024-01-11 NOTE — DISCHARGE INSTRUCTIONS
Please followup with orthopedic surgery regarding your ongoing knee pain.    As we discussed the NSAID's (like naprosyn, motrin, etc.) will help treat the inflammation as well as the pain that you are experiencing.     If you have a large, swollen, red-hot knee, especially if you are having fevers or chills or severe pain, you should come back to the ED for further evaluation.

## 2024-01-11 NOTE — ED ATTENDING ATTESTATION
1/10/2024  I, Sandy Carroll MD, saw and evaluated the patient. I have discussed the patient with the resident/non-physician practitioner and agree with the resident's/non-physician practitioner's findings, Plan of Care, and MDM as documented in the resident's/non-physician practitioner's note, except where noted. All available labs and Radiology studies were reviewed.  I was present for key portions of any procedure(s) performed by the resident/non-physician practitioner and I was immediately available to provide assistance.       At this point I agree with the current assessment done in the Emergency Department.  I have conducted an independent evaluation of this patient a history and physical is as follows:  This is a 58-year-old male here with left knee pain.  Patient states that he has had intermittent knee pain for a while, but never had pain like he has tonight.  Patient states that he was moving heavy equipment at work, and this evening he had sharp pain in his knee.  He states that it is in the lower aspect of his knee.  He is able to ambulate, but it hurts to do so.  He is able to range it.  He has not had fevers.  He does not have swelling in his other joints.  He has no history of gout.  He has never had surgery on the knee.  He has not fallen and does not have any other injuries.  On exam the patient has some mild tenderness to the inferior aspect of his patella.  He has a small joint effusion.  He has normal range of motion.  The joint is stable on Lachman's testing.  He has a negative Keyla's.  The patient is neurovascularly intact.  Impression: Knee pain, likely secondary to osteoarthritis, will do x-ray to rule out occult fracture or lesion, treat with NSAIDs  ED Course         Critical Care Time  Procedures

## 2024-01-12 ENCOUNTER — OFFICE VISIT (OUTPATIENT)
Dept: PODIATRY | Facility: CLINIC | Age: 59
End: 2024-01-12
Payer: COMMERCIAL

## 2024-01-12 VITALS
BODY MASS INDEX: 35.01 KG/M2 | WEIGHT: 251 LBS | HEART RATE: 72 BPM | DIASTOLIC BLOOD PRESSURE: 84 MMHG | SYSTOLIC BLOOD PRESSURE: 132 MMHG

## 2024-01-12 DIAGNOSIS — D36.10 NEUROMA: Primary | ICD-10-CM

## 2024-01-12 PROCEDURE — 64455 NJX AA&/STRD PLTR COM DG NRV: CPT | Performed by: PODIATRIST

## 2024-01-12 NOTE — PROGRESS NOTES
Here for 2nd neuroma injection. Had about 20% relief. Doctor did not give him met pads on last visit. RTC 6 wks for 3rd injection if necessary      Nerve block    Date/Time: 1/12/2024 3:15 PM    Performed by: Pramod Nuñez DPM  Authorized by: Pramod Nuñez DPM  Universal Protocol:  Consent: Verbal consent obtained.  Risks and benefits: risks, benefits and alternatives were discussed  Consent given by: patient  Patient understanding: patient states understanding of the procedure being performed  Patient consent: the patient's understanding of the procedure matches consent given  Patient identity confirmed: verbally with patient    Location:     Body area:  Lower extremity    Lower extremity nerve:  Digital    Nerve type:  Peripheral    Laterality:  Right  Pre-procedure details:     Skin preparation:  Alcohol  Skin anesthesia (see MAR for exact dosages):     Skin anesthesia method:  Topical application  Post-procedure details:     Dressing:  Sterile dressing    Outcome:  Anesthesia achieved    Patient tolerance of procedure:  Tolerated well, no immediate complications

## 2024-01-15 ENCOUNTER — OFFICE VISIT (OUTPATIENT)
Dept: OBGYN CLINIC | Facility: CLINIC | Age: 59
End: 2024-01-15
Payer: COMMERCIAL

## 2024-01-15 VITALS
HEART RATE: 73 BPM | SYSTOLIC BLOOD PRESSURE: 143 MMHG | BODY MASS INDEX: 35 KG/M2 | HEIGHT: 71 IN | DIASTOLIC BLOOD PRESSURE: 80 MMHG | WEIGHT: 250 LBS

## 2024-01-15 DIAGNOSIS — M25.562 LEFT KNEE PAIN: ICD-10-CM

## 2024-01-15 DIAGNOSIS — M17.12 PRIMARY OSTEOARTHRITIS OF LEFT KNEE: Primary | ICD-10-CM

## 2024-01-15 PROCEDURE — 99213 OFFICE O/P EST LOW 20 MIN: CPT | Performed by: PHYSICIAN ASSISTANT

## 2024-01-15 PROCEDURE — 20610 DRAIN/INJ JOINT/BURSA W/O US: CPT | Performed by: PHYSICIAN ASSISTANT

## 2024-01-15 RX ORDER — TRIAMCINOLONE ACETONIDE 40 MG/ML
80 INJECTION, SUSPENSION INTRA-ARTICULAR; INTRAMUSCULAR
Status: COMPLETED | OUTPATIENT
Start: 2024-01-15 | End: 2024-01-15

## 2024-01-15 RX ORDER — BUPIVACAINE HYDROCHLORIDE 2.5 MG/ML
2 INJECTION, SOLUTION INFILTRATION; PERINEURAL
Status: COMPLETED | OUTPATIENT
Start: 2024-01-15 | End: 2024-01-15

## 2024-01-15 RX ADMIN — TRIAMCINOLONE ACETONIDE 80 MG: 40 INJECTION, SUSPENSION INTRA-ARTICULAR; INTRAMUSCULAR at 07:00

## 2024-01-15 RX ADMIN — BUPIVACAINE HYDROCHLORIDE 2 ML: 2.5 INJECTION, SOLUTION INFILTRATION; PERINEURAL at 07:00

## 2024-01-15 NOTE — PROGRESS NOTES
Orthopaedic Surgery - Office Note  Prasad Langley (58 y.o. male)   : 1965   MRN: [id number]  Encounter Date: 1/15/2024    Chief Complaint   Patient presents with    Left Knee - Pain         Assessment/Plan  Diagnoses and all orders for this visit:    Primary osteoarthritis of left knee  -     Durable Medical Equipment  -     Ambulatory Referral to Physical Therapy; Future    Left knee pain  -     Ambulatory Referral to Orthopedic Surgery  -     Durable Medical Equipment  -     Ambulatory Referral to Physical Therapy; Future    Other orders  -     Large joint arthrocentesis    The diagnosis as well as treatment options were reviewed with the patient in the office today.  His x-rays were reviewed with the patient.  He was advised he has significant osteoarthritis in the knee.  The only definitive treatment would be a left total knee arthroplasty.  Other conservative options will be treating the symptoms.  He does not wish to consider surgery at this time.  The risks and benefits of a cortisone injection were reviewed.  Shared decision-making was utilized and he elected to proceed with the injection.  He tolerated this well and there were no complications.  He will ice the knee for comfort 20 minutes on 1 hour off 3 times a day.  He may continue the oral naproxen with food stopping and calling if any stomach upset occurs.  I would recommend formal physical therapy for a dedicated home exercise program to maintain his strength and range of motion.  He will be provided a hinged knee sleeve for comfort and support that may be be worn when active.  We discussed Visco injections as a next step treatment option if incomplete relief of pain is obtained with cortisone.  He will return in 6 weeks for repeat evaluation or earlier if needed.       Return for Recheck 6 weeks with  myself.        History of Present Illness  This is a new patient with left knee pain.  He was seen at the emergency department for this condition  "on 1/10/2024.  Patient reports his pain has been ongoing but gets worse based on activity.  He has tried naproxen which helped a little.  He reports that the swelling comes and goes and is not currently swollen.  He denies any recent injury but states he has had multiple injuries in the past.  No calf pain paresthesias leg pain or back pain is reported.  He does not wish to consider surgery at this time.    He is not diabetic.  His most recent hemoglobin A1c was 6.0.    Review of Systems  Pertinent items are noted in HPI.  All other systems were reviewed and are negative.    Physical Exam  /80 (BP Location: Left arm, Patient Position: Sitting, Cuff Size: Large)   Pulse 73   Ht 5' 11\" (1.803 m)   Wt 113 kg (250 lb)   BMI 34.87 kg/m²   Cons: Appears well.  No apparent distress.  Psych: Alert. Oriented x3.  Mood and affect normal.    Left knee is without skin breakdown lesion or signs of infection.  There is no intra-articular effusion today.  Patient has slight valgus deformity.  He has full extension and flexes to 125 degrees.  There is no calf tenderness there is negative Homans.  He has no pain or instability with valgus and varus stressing.  He is tender to palpation on both the medial and lateral joint line with negative Keyla's.  He has no instability with Lachman and posterior drawer.  He is mildly tender on the medial and lateral border of the patella.  There is mild quad weakness to 4 out of 5.  Hamstring strength is 5 out of 5.  Gait is heel-to-toe               Studies Reviewed  LEFT KNEE     INDICATION:   swelling pain, unknown trauma, suspect osteoarthritis.     COMPARISON:  None     VIEWS:  XR KNEE 4+ VW LEFT INJURY        FINDINGS:     There is no acute fracture or dislocation.     There is no joint effusion.     Tricompartmental osteoarthritis with severe narrowing of the medial tibiofemoral joint space and osteophytes seen in all 3 compartments.  There is mild genu varus.     No lytic or " "blastic osseous lesion.     Soft tissues are unremarkable.     IMPRESSION:     No acute osseous abnormality.     Degenerative changes as described.           Workstation performed: [id number]  X-ray images as well as reports were reviewed by myself in the office today and I agree with radiologist interpretation.  Emergency department notes from 1/10/2024 were reviewed by myself in the office today.    Large joint arthrocentesis: L knee  Universal Protocol:  Consent: Verbal consent obtained.  Risks and benefits: risks, benefits and alternatives were discussed  Consent given by: patient  Time out: Immediately prior to procedure a \"time out\" was called to verify the correct patient, procedure, equipment, support staff and site/side marked as required.  Patient understanding: patient states understanding of the procedure being performed  Patient consent: the patient's understanding of the procedure matches consent given  Relevant documents: relevant documents present and verified  Test results: test results available and properly labeled  Site marked: the operative site was marked  Radiology Images displayed and confirmed. If images not available, report reviewed: imaging studies available  Patient identity confirmed: verbally with patient  Supporting Documentation  Indications: pain   Procedure Details  Location: knee - L knee  Preparation: Patient was prepped and draped in the usual sterile fashion  Needle size: 22 G  Ultrasound guidance: no  Approach: anterolateral  Medications administered: 2 mL bupivacaine 0.25 %; 80 mg triamcinolone acetonide 40 mg/mL    Patient tolerance: patient tolerated the procedure well with no immediate complications  Dressing:  Sterile dressing applied        Medical, Surgical, Family, and Social History  The patient's medical history, family history, and social history, were reviewed and updated as appropriate.    Past Medical History:   Diagnosis Date    Acute suppurative otitis media of " left ear without spontaneous rupture of tympanic membrane 2018    Anxiety     Depression     HL (hearing loss)     Hyperlipidemia     Hypertension     Obesity (BMI 35.0-39.9 without comorbidity) 2017       Past Surgical History:   Procedure Laterality Date    SD OPEN TREATMENT METATARSAL FRACTURE EACH Right 2023    Procedure: OPEN REDUCTION W/ INTERNAL FIXATION (ORIF) FOOT, ORIF dupree fracture 5th met;  Surgeon: Pramod Nuñez DPM;  Location: [facility];  Service: Podiatry    WISDOM TOOTH EXTRACTION         Family History   Problem Relation Age of Onset    Diabetes Father         borderline    Hypothyroidism Father     Prostate cancer Father     Heart disease Father     Skin cancer Father     Dementia Father     Hypothyroidism Sister     Fibromyalgia Sister     Heart disease Sister     Ovarian cancer Mother     Skin cancer Mother        Social History     Occupational History    Not on file   Tobacco Use    Smoking status: Former     Current packs/day: 0.00     Average packs/day: 0.5 packs/day for 25.9 years (13.0 ttl pk-yrs)     Types: Cigarettes     Start date:      Quit date: 2015     Years since quittin.1    Smokeless tobacco: Never   Vaping Use    Vaping status: Never Used   Substance and Sexual Activity    Alcohol use: Yes     Alcohol/week: 4.0 standard drinks of alcohol     Types: 4 Cans of beer per week    Drug use: Never    Sexual activity: Yes       No Known Allergies      Current Outpatient Medications:     aspirin 81 MG tablet, Take 1 tablet by mouth daily, Disp: , Rfl:     atorvastatin (LIPITOR) 20 mg tablet, Take 1 tablet (20 mg total) by mouth daily, Disp: 90 tablet, Rfl: 1    buPROPion (WELLBUTRIN XL) 150 mg 24 hr tablet, Take 1 tablet (150 mg total) by mouth daily, Disp: 90 tablet, Rfl: 1    co-enzyme Q-10 30 MG capsule, Take 30 mg by mouth daily, Disp: , Rfl:     Green Tea 150 MG CAPS, Take by mouth daily, Disp: , Rfl:     Multiple Vitamins-Minerals  (MULTIVITAMIN ADULT) TABS, Take 1 tablet by mouth daily, Disp: , Rfl:     naproxen (Naprosyn) 500 mg tablet, Take 1 tablet (500 mg total) by mouth 2 (two) times a day with meals, Disp: 30 tablet, Rfl: 0    Omega-3 Fatty Acids ([employer] FISH OIL) 1000 MG CAPS, Take 2 capsules by mouth 2 (two) times a day, Disp: , Rfl:     Turmeric Curcumin 500 MG CAPS, Take 1 capsule by mouth daily, Disp: , Rfl:     venlafaxine (EFFEXOR-XR) 150 mg 24 hr capsule, Take 1 capsule (150 mg total) by mouth daily, Disp: 90 capsule, Rfl: 1      Reji Tobar PA-C

## 2024-01-15 NOTE — PATIENT INSTRUCTIONS
Hyaluronic Acid (By injection)   Hyaluronic Acid (hib-xc-bpn-ON-ate AS-id)  Treats severe pain in your knee due to osteoarthritis.   Brand Name(s): Durolane, Euflexxa, Gel-One, GelSyn-3, GenVisc 850, Hyalgan, Hymovis, Monovisc, Orthovisc, Supartz FX, TriLURON, TriVisc, Visco-3   There may be other brand names for this medicine.  When This Medicine Should Not Be Used:   This medicine is not right for everyone. You should not receive it if you had an allergic reaction to hyaluronic acid or if you have a bleeding disorder.  How to Use This Medicine:   Injectable  Your doctor will tell you how many injections you will need. This medicine is injected into your knee joint.  A nurse or other health provider will give you this medicine.  Drugs and Foods to Avoid:      Ask your doctor or pharmacist before using any other medicine, including over-the-counter medicines, vitamins, and herbal products.      Warnings While Using This Medicine:   Tell your doctor if you are pregnant or breastfeeding, or if you have any allergies, including to birds, feathers, or eggs.  Rest your knee for 48 hours after you receive an injection. Do not do strenuous, weightbearing activities, such as jogging or tennis. Avoid activities that keep you standing for longer than 1 hour.  Possible Side Effects While Using This Medicine:   Call your doctor right away if you notice any of these side effects:  Allergic reaction: Itching or hives, swelling in your face or hands, swelling or tingling in your mouth or throat, chest tightness, trouble breathing  If you notice these less serious side effects, talk with your doctor:   Mild increase in pain or swelling in your knee  Pain, redness, or swelling where the medicine is injected  If you notice other side effects that you think are caused by this medicine, tell your doctor.   Call your doctor for medical advice about side effects. You may report side effects to FDA at 5-292-FDA-1434  © Copyright [employer]  2023 Information is for End User's use only and may not be sold, redistributed or otherwise used for commercial purposes.  The above information is an  only. It is not intended as medical advice for individual conditions or treatments. Talk to your doctor, nurse or pharmacist before following any medical regimen to see if it is safe and effective for you.

## 2024-01-16 ENCOUNTER — PATIENT MESSAGE (OUTPATIENT)
Dept: INTERNAL MEDICINE CLINIC | Facility: OTHER | Age: 59
End: 2024-01-16

## 2024-01-16 DIAGNOSIS — F32.A DEPRESSION, UNSPECIFIED DEPRESSION TYPE: Primary | ICD-10-CM

## 2024-01-22 ENCOUNTER — EVALUATION (OUTPATIENT)
Dept: PHYSICAL THERAPY | Facility: REHABILITATION | Age: 59
End: 2024-01-22
Payer: COMMERCIAL

## 2024-01-22 DIAGNOSIS — M17.12 PRIMARY OSTEOARTHRITIS OF LEFT KNEE: ICD-10-CM

## 2024-01-22 DIAGNOSIS — M25.562 ACUTE PAIN OF LEFT KNEE: ICD-10-CM

## 2024-01-22 PROCEDURE — 97161 PT EVAL LOW COMPLEX 20 MIN: CPT

## 2024-01-22 NOTE — PROGRESS NOTES
PT Evaluation     Today's date: 2024  Patient name: Prasad Langley  : 1965  MRN: [id number]  Referring provider: Reji Tobar PA*  Dx:   Encounter Diagnosis     ICD-10-CM    1. Acute pain of left knee  M25.562 Ambulatory Referral to Physical Therapy      2. Primary osteoarthritis of left knee  M17.12 Ambulatory Referral to Physical Therapy                     Assessment  Assessment details: Pt, Prasad Langley , is a 58 y.o.  presenting to physical therapy on this date for an initial evaluation with reports of acute left knee pain without specific YURIY. Upon eval on this date, pt presented with WFL ROM, minimally decreased hip abduction and extension strength, and overall impaired tolerance to functional activities. Pt was educated that his symptoms are likely overuse in nature due to the amount of activities he completed prior to his knee being aggravated. Pt was provided with initial HEP targeting mobility to his tolerance to improve overall tolerance to activities and mobility. Pt was also educated on weaning himself out of the knee brace he is wearing to decrease reliance on external support for stability, pt verbalized understanding. At this time, pt would benefit from skilled OP PT to work on deficits listed above and improve overall functional mobility with decreased reports of pain and compensation patterns. POC was discussed with pt, pt verbalized understanding and is in agreement. Thank you for this referral.  Impairments: abnormal muscle firing, abnormal muscle tone, abnormal or restricted ROM, abnormal movement, activity intolerance, impaired physical strength, lacks appropriate home exercise program, pain with function, poor posture  and poor body mechanics    Goals  STG:   Pt will  report decreased pain by 25% in 2 weeks to demonstrate improved tolerance to functional activities and overall mobility   Pt will be I with initial HEP to progress towards independence with exercise  "    LTG:   Pt will report being able to complete ADLs without pain > 2/10 by d/c   Pt will improve BLE strength to 4+/5 or greater by d/c for increased ease and safety with functional mobility and activities   Pt will be I with modified/updated HEP and demonstrate ability to complete with confidence and proper mechanics/form to safely be d/c from skilled OP PT and continue with exercises on their own        Plan  Patient would benefit from: PT eval and skilled physical therapy  Planned modality interventions: cryotherapy and thermotherapy: hydrocollator packs  Planned therapy interventions: body mechanics training, functional ROM exercises, home exercise program, therapeutic exercise, therapeutic activities, stretching, strengthening, postural training, patient education, neuromuscular re-education, motor coordination training, manual therapy, joint mobilization, nerve gliding, balance, balance/weight bearing training and gait training  Frequency: 1x week  Duration in visits: 8  Duration in weeks: 8  Treatment plan discussed with: patient        Subjective Evaluation    History of Present Illness  Mechanism of injury: Pt reports that when he shoveled snow a few weeks ago he was fine but then he went to work the next day and was okay but it felt a little off and he also went on the bike and gave it a good workout. He reports that his knee felt stiff after the workout and by the time the end of the day came around his knee was bothering him and he went to the ER because of the pain. He reports that he did brake his right foot last year so he may have strained his knee then. Pt denies any n/t in his knee or specific YURIY for his knee. Pt reports that his knee continues to feel stiff and he is wearing his knee brace all time now. He reports that his pain is mostly in the same spot in the front of his knee. Pt reports that his knee is limiting him with activities.       Imaging 1/10/24   \"FINDINGS:     There is no acute " "fracture or dislocation.  There is no joint effusion.  Tricompartmental osteoarthritis with severe narrowing of the medial tibiofemoral joint space and osteophytes seen in all 3 compartments.  There is mild genu varus.  No lytic or blastic osseous lesion.  Soft tissues are unremarkable.     IMPRESSION:  No acute osseous abnormality.  Degenerative changes as described.\"     Patient Goals  Patient goals for therapy: decreased pain, increased motion, increased strength, independence with ADLs/IADLs and return to sport/leisure activities  Patient goal: increase flexibility, get back to using the bike  Pain  Current pain ratin  At worst pain ratin (pain increases with rest)  Quality: tight, dull ache and discomfort  Relieving factors: medications  Aggravating factors: walking and stair climbing    Social Support  Steps to enter house: yes  Stairs in house: yes   Lives in: multiple-level home    Treatments  No previous or current treatments        Objective     Observations     Additional Observation Details  Pt arrived to therapy wearing OTC knee brace on L knee     Tenderness     Additional Tenderness Details  Pt reported no TTP     Neurological Testing     Sensation     Knee   Left Knee   Intact: Light touch    Right Knee   Intact: light touch     Active Range of Motion   Left Knee   Flexion: WFL  Extension: WFL    Right Knee   Flexion: WFL  Extension: WFL    Passive Range of Motion   Left Knee   Flexion: WFL  Extension: WFL    Right Knee   Flexion: WFL  Extension: WFL    Mobility   Patellar Mobility:   Left Knee   WFL: medial, lateral, superior and inferior.     Right Knee   WFL: medial, lateral, superior and inferior    Strength/Myotome Testing     Left Hip   Planes of Motion   Flexion: 5  Extension: 4  Abduction: 4+    Right Hip   Planes of Motion   Flexion: 5  Extension: 4  Abduction: 4+    Left Knee   Flexion: 5  Extension: 5  Quadriceps contraction: good    Right Knee   Flexion: 5  Extension: " 5  Quadriceps contraction: good    Left Ankle/Foot   Dorsiflexion: 5    Right Ankle/Foot   Dorsiflexion: 5    Tests     Left Knee   Negative valgus stress test at 0 degrees, valgus stress test at 30 degrees, varus stress test at 0 degrees and varus stress test at 30 degrees.     Right Knee   Negative valgus stress test at 0 degrees, valgus stress test at 30 degrees, varus stress test at 0 degrees and varus stress test at 30 degrees.     Ambulation     Observational Gait     Additional Observational Gait Details  Mild antalgic gait              Precautions:   Patient Active Problem List   Diagnosis    Depression    Dyslipidemia    Obesity (BMI 35.0-39.9 without comorbidity)    Snoring    Decreased hearing of both ears    Abnormal liver ultrasound    Screening for malignant neoplasm of colon    Skin lesion of face    Prediabetes    Closed nondisplaced fracture of fifth metatarsal bone    ELMER (obstructive sleep apnea)    Dermatitis of face    Other insomnia    Annual physical exam    Left knee pain    Primary osteoarthritis of left knee         1/22            FOTO IE            IE/RE IE               Manuals                                                                 Neuro Re-Ed             Bridges  demo            Mini Squats  demo            Side stepping with TB              Monster walk              Eccentric lateral step down              SLS on foam              Tandem stance on foam             Obstacle course                           Ther Ex             Bike              SLR with quad set  Demo            Standing hip abd, ext  demo            Leg Press              SA ext, flex                                                                               Modalities                                           Access Code: [email or web address]  URL: [email or web address]  Date: 01/22/2024  Prepared by: Jacy Carr    Exercises  - Supine Active Straight Leg Raise  - 1 x daily - 7 x weekly - 15 reps - 5  hold  - Supine Bridge  - 1 x daily - 7 x weekly - 15 reps - 5 hold  - Mini Squat with Counter Support  - 1 x daily - 7 x weekly - 15 reps  - Standing Hip Abduction with Counter Support  - 1 x daily - 7 x weekly - 15 reps  - Standing Hip Extension with Counter Support  - 1 x daily - 7 x weekly - 15 reps

## 2024-01-26 ENCOUNTER — OFFICE VISIT (OUTPATIENT)
Dept: PHYSICAL THERAPY | Facility: REHABILITATION | Age: 59
End: 2024-01-26
Payer: COMMERCIAL

## 2024-01-26 DIAGNOSIS — M17.12 PRIMARY OSTEOARTHRITIS OF LEFT KNEE: ICD-10-CM

## 2024-01-26 DIAGNOSIS — M25.562 ACUTE PAIN OF LEFT KNEE: Primary | ICD-10-CM

## 2024-01-26 PROCEDURE — 97110 THERAPEUTIC EXERCISES: CPT

## 2024-01-26 PROCEDURE — 97112 NEUROMUSCULAR REEDUCATION: CPT

## 2024-01-26 NOTE — PROGRESS NOTES
Daily Note     Today's date: 2024  Patient name: Prasad Langley  : 1965  MRN: [id number]  Referring provider: Reji Tobar PA*  Dx:   Encounter Diagnosis     ICD-10-CM    1. Acute pain of left knee  M25.562       2. Primary osteoarthritis of left knee  M17.12                      Subjective: Pt reports that he is okay and that the exercises given to him at his HEP are pointing out areas he needs to work on.       Objective: See treatment diary below      Assessment: Pt tolerated treatment well. Pt was educated that due to completing new exercises on this date, he may feel some soreness later like he did a workout but it should not be an increase in pain. Pt was educated to inform therapist at next appointment how he felt so that plan of care can be adjusted as needed, pt verbalized understanding. Pt completed exercises in his knee brace today, pt would benefit from completing future sessions out of brace to improve natural stability and proprioception. Pt was provided with updated HEP on this date. Patient demonstrated fatigue post treatment, exhibited good technique with therapeutic exercises, and would benefit from continued PT.       Plan: Continue per plan of care.  Progress treatment as tolerated.       Precautions:   Patient Active Problem List   Diagnosis    Depression    Dyslipidemia    Obesity (BMI 35.0-39.9 without comorbidity)    Snoring    Decreased hearing of both ears    Abnormal liver ultrasound    Screening for malignant neoplasm of colon    Skin lesion of face    Prediabetes    Closed nondisplaced fracture of fifth metatarsal bone    ELMER (obstructive sleep apnea)    Dermatitis of face    Other insomnia    Annual physical exam    Left knee pain    Primary osteoarthritis of left knee                    FOTO IE            IE/RE IE               Manuals                                                                 Neuro Re-Ed             Bridges  demo            Mini  "Squats  demo            Side stepping with TB   Green TB x3 laps            Monster walk   Green  TB x3 laps            Eccentric lateral step down              SLS on foam              Tandem stance on foam             Obstacle course                           Ther Ex             Bike   X8 min            SLR with quad set  Demo            Standing hip abd, ext  demo            Leg Press   66# 2x10            SA ext, flex   SA flex 33# 2x10     SA Ext 33# 2x10              Clam GTB 5\" x10 ea                                                     HEP update and review           Modalities                                           Access Code: [email or web address]  URL: [email or web address]  Date: 01/22/2024  Prepared by: Jacy Carr    Exercises  - Supine Active Straight Leg Raise  - 1 x daily - 7 x weekly - 15 reps - 5 hold  - Supine Bridge  - 1 x daily - 7 x weekly - 15 reps - 5 hold  - Mini Squat with Counter Support  - 1 x daily - 7 x weekly - 15 reps  - Standing Hip Abduction with Counter Support  - 1 x daily - 7 x weekly - 15 reps  - Standing Hip Extension with Counter Support  - 1 x daily - 7 x weekly - 15 reps             "

## 2024-01-31 ENCOUNTER — OFFICE VISIT (OUTPATIENT)
Dept: PHYSICAL THERAPY | Facility: REHABILITATION | Age: 59
End: 2024-01-31
Payer: COMMERCIAL

## 2024-01-31 DIAGNOSIS — M17.12 PRIMARY OSTEOARTHRITIS OF LEFT KNEE: ICD-10-CM

## 2024-01-31 DIAGNOSIS — M25.562 ACUTE PAIN OF LEFT KNEE: Primary | ICD-10-CM

## 2024-01-31 PROCEDURE — 97112 NEUROMUSCULAR REEDUCATION: CPT

## 2024-01-31 PROCEDURE — 97110 THERAPEUTIC EXERCISES: CPT

## 2024-01-31 NOTE — PROGRESS NOTES
Daily Note     Today's date: 2024  Patient name: Prasad Langley  : 1965  MRN: [id number]  Referring provider: Reji Tobar PA*  Dx:   Encounter Diagnosis     ICD-10-CM    1. Acute pain of left knee  M25.562       2. 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 He reports no adverse reactions or pain after last visit.       Objective: See treatment diary below      Assessment: Pt tolerated treatment well. Pt was challenged with addition of balance exercises on this date with observed increased postural sway and need for UE touch to maintain stability. Pt was educated to try and wean himself out of his brace when doing his exercises to promote improved proprioception and decreased reliance on external force for support, pt verbalized understanding. Patient demonstrated fatigue post treatment, exhibited good technique with therapeutic exercises, and would benefit from continued PT.      Plan: Continue per plan of care.  Progress treatment as tolerated.       Precautions:   Patient Active Problem List   Diagnosis    Depression    Dyslipidemia    Obesity (BMI 35.0-39.9 without comorbidity)    Snoring    Decreased hearing of both ears    Abnormal liver ultrasound    Screening for malignant neoplasm of colon    Skin lesion of face    Prediabetes    Closed nondisplaced fracture of fifth metatarsal bone    ELMER (obstructive sleep apnea)    Dermatitis of face    Other insomnia    Annual physical exam    Left knee pain    Primary osteoarthritis of left knee                   FOTO IE            IE/RE IE               Manuals                                                                 Neuro Re-Ed             Bridges  demo            Mini Squats  demo            Side stepping with TB   Green TB x3  "laps            Monster walk   Green  TB x3 laps            Eccentric lateral step down    0R x10 lat, fwd b/l           SLS on foam    2x30\"           Tandem stance on foam   2x30\" ea           Obstacle course                 STS x10 no UE           Ther Ex             Bike   X8 min  X8 min           SLR with quad set  Demo            Standing hip abd, ext  demo            Leg Press   66# 2x10  66# 2x10           SA ext, flex   SA flex 33# 2x10     SA Ext 33# 2x10  SA flex 33# 2x10     SA Ext 33# 2x10             Clam GTB 5\" x10 ea                                                     HEP update and review           Modalities                                           Access Code: [email or web address]  URL: [email or web address]  Date: 01/22/2024  Prepared by: Jacy Carr    Exercises  - Supine Active Straight Leg Raise  - 1 x daily - 7 x weekly - 15 reps - 5 hold  - Supine Bridge  - 1 x daily - 7 x weekly - 15 reps - 5 hold  - Mini Squat with Counter Support  - 1 x daily - 7 x weekly - 15 reps  - Standing Hip Abduction with Counter Support  - 1 x daily - 7 x weekly - 15 reps  - Standing Hip Extension with Counter Support  - 1 x daily - 7 x weekly - 15 reps               "

## 2024-02-07 ENCOUNTER — OFFICE VISIT (OUTPATIENT)
Dept: PHYSICAL THERAPY | Facility: REHABILITATION | Age: 59
End: 2024-02-07
Payer: COMMERCIAL

## 2024-02-07 DIAGNOSIS — M17.12 PRIMARY OSTEOARTHRITIS OF LEFT KNEE: ICD-10-CM

## 2024-02-07 DIAGNOSIS — M25.562 ACUTE PAIN OF LEFT KNEE: Primary | ICD-10-CM

## 2024-02-07 PROCEDURE — 97535 SELF CARE MNGMENT TRAINING: CPT

## 2024-02-07 PROCEDURE — 97112 NEUROMUSCULAR REEDUCATION: CPT

## 2024-02-07 PROCEDURE — 97110 THERAPEUTIC EXERCISES: CPT

## 2024-02-07 NOTE — PROGRESS NOTES
"Daily Note     Today's date: 2024  Patient name: Prasad Langley  : 1965  MRN: [id number]  Referring provider: Reji Tobar PA*  Dx:   Encounter Diagnosis     ICD-10-CM    1. Acute pain of left knee  M25.562       2. Primary osteoarthritis of left knee  M17.12                      Subjective: Pt reports that he is feeling okay, no new complaints. He reports that activity wise everything is feeling okay and he knows he just has to keep up with his exercises.       Objective: See treatment diary below      Assessment: Pt tolerated treatment well. Pt did well with increased reps for weight machines on today's date without increased pain. At this time, pt reports that he feels like he has a good workout program to be able to continue self-management of his symptoms on his own at home. HEP was reviewed and updated, all questions were answered. Pt was educated to contact PT with any questions or concerns, pt verbalized understanding.       Plan: Self d/c to HEP.      Precautions:   Patient Active Problem List   Diagnosis    Depression    Dyslipidemia    Obesity (BMI 35.0-39.9 without comorbidity)    Snoring    Decreased hearing of both ears    Abnormal liver ultrasound    Screening for malignant neoplasm of colon    Skin lesion of face    Prediabetes    Closed nondisplaced fracture of fifth metatarsal bone    ELMER (obstructive sleep apnea)    Dermatitis of face    Other insomnia    Annual physical exam    Left knee pain    Primary osteoarthritis of left knee                   FOTO IE   Perf MET          IE/RE IE               Manuals                                                                 Neuro Re-Ed             Bridges  demo            Mini Squats  demo   Wall sits 5\" x10          Side stepping with TB   Green TB x3 laps            Monster walk   Green  TB x3 laps            Eccentric lateral step down    0R x10 lat, fwd b/l           SLS on foam    2x30\"           Tandem stance " "on foam   2x30\" ea           Obstacle course                 STS x10 no UE           Ther Ex             Bike   X8 min  X8 min  X8 min          SLR with quad set  Demo            Standing hip abd, ext  demo   Single leg dead lift x10 ea          Leg Press   66# 2x10  66# 2x10  66# 3x10          SA ext, flex   SA flex 33# 2x10     SA Ext 33# 2x10  SA flex 33# 2x10     SA Ext 33# 2x10  SA flex 33# 3x10     SA Ext 33# 3x10            Clam GTB 5\" x10 ea                                                     HEP update and review  HEP update and review          Modalities                                           Access Code: [email or web address]  URL: [email or web address]  Date: 01/22/2024  Prepared by: Jacy Carr    Exercises  - Supine Active Straight Leg Raise  - 1 x daily - 7 x weekly - 15 reps - 5 hold  - Supine Bridge  - 1 x daily - 7 x weekly - 15 reps - 5 hold  - Mini Squat with Counter Support  - 1 x daily - 7 x weekly - 15 reps  - Standing Hip Abduction with Counter Support  - 1 x daily - 7 x weekly - 15 reps  - Standing Hip Extension with Counter Support  - 1 x daily - 7 x weekly - 15 reps                 "

## 2024-02-08 ENCOUNTER — TELEPHONE (OUTPATIENT)
Dept: PSYCHIATRY | Facility: CLINIC | Age: 59
End: 2024-02-08

## 2024-02-15 NOTE — TELEPHONE ENCOUNTER
IC called pt from the wait list and left voice message about scheduling a med management appt in the '[facility] office.

## 2024-02-21 ENCOUNTER — OFFICE VISIT (OUTPATIENT)
Dept: PODIATRY | Facility: CLINIC | Age: 59
End: 2024-02-21
Payer: COMMERCIAL

## 2024-02-21 VITALS
HEART RATE: 73 BPM | BODY MASS INDEX: 34.87 KG/M2 | DIASTOLIC BLOOD PRESSURE: 88 MMHG | WEIGHT: 250 LBS | SYSTOLIC BLOOD PRESSURE: 136 MMHG

## 2024-02-21 DIAGNOSIS — D36.10 NEUROMA: Primary | ICD-10-CM

## 2024-02-21 DIAGNOSIS — M79.671 RIGHT FOOT PAIN: ICD-10-CM

## 2024-02-21 DIAGNOSIS — Q66.221 METATARSUS ADDUCTUS OF RIGHT FOOT: ICD-10-CM

## 2024-02-21 DIAGNOSIS — M79.671 PAIN IN RIGHT FOOT: ICD-10-CM

## 2024-02-21 PROBLEM — Z12.11 SCREENING FOR MALIGNANT NEOPLASM OF COLON: Status: RESOLVED | Noted: 2019-04-19 | Resolved: 2024-02-21

## 2024-02-21 PROCEDURE — 99213 OFFICE O/P EST LOW 20 MIN: CPT | Performed by: PODIATRIST

## 2024-02-21 NOTE — PROGRESS NOTES
Assessment/Plan:      Diagnoses and all orders for this visit:    Neuroma    Pain in right foot    Metatarsus adductus of right foot    Right foot pain      - No injection today  - continue CMO and supportive shoes  - Advised permanent met pads  - Advised supportive shoes at all times  - RTC 2-3 months for continued care    Subjective:     Patient ID: Prasad Langley is a 58 y.o. male.    Patient presents for evaluation and management of his right foot. States that the padding is helping a lot.  2 injections, states that the pain is now 20% better since the previous visit so he is now overall 40% better after 2 injections and padding.        Review of Systems   Constitutional:  Negative for chills and fever.   HENT:  Negative for ear pain and sore throat.    Eyes:  Negative for pain and visual disturbance.   Respiratory:  Negative for cough and shortness of breath.    Cardiovascular:  Negative for chest pain and palpitations.   Gastrointestinal:  Negative for abdominal pain and vomiting.   Genitourinary:  Negative for dysuria and hematuria.   Musculoskeletal:  Negative for arthralgias and back pain.   Skin:  Negative for color change and rash.   Neurological:  Negative for seizures and syncope.   All other systems reviewed and are negative.        Objective:     Physical Exam  Vitals reviewed.   Constitutional:       Appearance: Normal appearance. He is normal weight.   HENT:      Head: Normocephalic and atraumatic.      Nose: Nose normal.      Mouth/Throat:      Mouth: Mucous membranes are moist.   Eyes:      Pupils: Pupils are equal, round, and reactive to light.   Pulmonary:      Effort: Pulmonary effort is normal.   Musculoskeletal:      Comments: Trigger point is improved, continued chronic foot deformities.    Skin:     Capillary Refill: Capillary refill takes less than 2 seconds.   Neurological:      General: No focal deficit present.      Mental Status: He is alert and oriented to person, place, and time.  Mental status is at baseline.

## 2024-03-16 ENCOUNTER — APPOINTMENT (EMERGENCY)
Dept: RADIOLOGY | Facility: HOSPITAL | Age: 59
End: 2024-03-16
Payer: COMMERCIAL

## 2024-03-16 ENCOUNTER — HOSPITAL ENCOUNTER (EMERGENCY)
Facility: HOSPITAL | Age: 59
Discharge: HOME | End: 2024-03-16
Attending: EMERGENCY MEDICINE
Payer: COMMERCIAL

## 2024-03-16 VITALS
OXYGEN SATURATION: 98 % | DIASTOLIC BLOOD PRESSURE: 63 MMHG | HEART RATE: 64 BPM | RESPIRATION RATE: 14 BRPM | TEMPERATURE: 98.6 F | SYSTOLIC BLOOD PRESSURE: 126 MMHG

## 2024-03-16 DIAGNOSIS — R07.9 CHEST PAIN: Primary | ICD-10-CM

## 2024-03-16 LAB
2HR DELTA HS TROPONIN: -2 NG/L
ALBUMIN SERPL BCP-MCNC: 4.3 G/DL (ref 3.5–5)
ALP SERPL-CCNC: 45 U/L (ref 34–104)
ALT SERPL W P-5'-P-CCNC: 25 U/L (ref 7–52)
ANION GAP SERPL CALCULATED.3IONS-SCNC: 6 MMOL/L (ref 4–13)
AST SERPL W P-5'-P-CCNC: 22 U/L (ref 13–39)
ATRIAL RATE: 68 BPM
BASOPHILS # BLD AUTO: 0.03 THOUSANDS/ÂΜL (ref 0–0.1)
BASOPHILS NFR BLD AUTO: 1 % (ref 0–1)
BILIRUB SERPL-MCNC: 1.08 MG/DL (ref 0.2–1)
BUN SERPL-MCNC: 13 MG/DL (ref 5–25)
CALCIUM SERPL-MCNC: 9 MG/DL (ref 8.4–10.2)
CARDIAC TROPONIN I PNL SERPL HS: 3 NG/L
CARDIAC TROPONIN I PNL SERPL HS: 5 NG/L
CHLORIDE SERPL-SCNC: 104 MMOL/L (ref 96–108)
CO2 SERPL-SCNC: 27 MMOL/L (ref 21–32)
CREAT SERPL-MCNC: 0.92 MG/DL (ref 0.6–1.3)
EOSINOPHIL # BLD AUTO: 0.16 THOUSAND/ÂΜL (ref 0–0.61)
EOSINOPHIL NFR BLD AUTO: 3 % (ref 0–6)
ERYTHROCYTE [DISTWIDTH] IN BLOOD BY AUTOMATED COUNT: 14.1 % (ref 11.6–15.1)
GFR SERPL CREATININE-BSD FRML MDRD: 92 ML/MIN/1.73SQ M
GLUCOSE SERPL-MCNC: 122 MG/DL (ref 65–140)
HCT VFR BLD AUTO: 43.4 % (ref 36.5–49.3)
HGB BLD-MCNC: 13.9 G/DL (ref 12–17)
IMM GRANULOCYTES # BLD AUTO: 0.02 THOUSAND/UL (ref 0–0.2)
IMM GRANULOCYTES NFR BLD AUTO: 0 % (ref 0–2)
LYMPHOCYTES # BLD AUTO: 2.14 THOUSANDS/ÂΜL (ref 0.6–4.47)
LYMPHOCYTES NFR BLD AUTO: 33 % (ref 14–44)
MCH RBC QN AUTO: 31.2 PG (ref 26.8–34.3)
MCHC RBC AUTO-ENTMCNC: 32 G/DL (ref 31.4–37.4)
MCV RBC AUTO: 98 FL (ref 82–98)
MONOCYTES # BLD AUTO: 0.53 THOUSAND/ÂΜL (ref 0.17–1.22)
MONOCYTES NFR BLD AUTO: 8 % (ref 4–12)
NEUTROPHILS # BLD AUTO: 3.52 THOUSANDS/ÂΜL (ref 1.85–7.62)
NEUTS SEG NFR BLD AUTO: 55 % (ref 43–75)
NRBC BLD AUTO-RTO: 0 /100 WBCS
P AXIS: 53 DEGREES
PLATELET # BLD AUTO: 184 THOUSANDS/UL (ref 149–390)
PMV BLD AUTO: 10.5 FL (ref 8.9–12.7)
POTASSIUM SERPL-SCNC: 4.6 MMOL/L (ref 3.5–5.3)
PR INTERVAL: 164 MS
PROT SERPL-MCNC: 6.9 G/DL (ref 6.4–8.4)
QRS AXIS: 18 DEGREES
QRSD INTERVAL: 82 MS
QT INTERVAL: 354 MS
QTC INTERVAL: 376 MS
RBC # BLD AUTO: 4.45 MILLION/UL (ref 3.88–5.62)
SODIUM SERPL-SCNC: 137 MMOL/L (ref 135–147)
T WAVE AXIS: 0 DEGREES
VENTRICULAR RATE: 68 BPM
WBC # BLD AUTO: 6.4 THOUSAND/UL (ref 4.31–10.16)

## 2024-03-16 PROCEDURE — 71046 X-RAY EXAM CHEST 2 VIEWS: CPT

## 2024-03-16 PROCEDURE — 93005 ELECTROCARDIOGRAM TRACING: CPT

## 2024-03-16 PROCEDURE — 93010 ELECTROCARDIOGRAM REPORT: CPT | Performed by: INTERNAL MEDICINE

## 2024-03-16 PROCEDURE — 99285 EMERGENCY DEPT VISIT HI MDM: CPT

## 2024-03-16 PROCEDURE — 85025 COMPLETE CBC W/AUTO DIFF WBC: CPT

## 2024-03-16 PROCEDURE — 80053 COMPREHEN METABOLIC PANEL: CPT

## 2024-03-16 PROCEDURE — 84484 ASSAY OF TROPONIN QUANT: CPT

## 2024-03-16 PROCEDURE — 36415 COLL VENOUS BLD VENIPUNCTURE: CPT

## 2024-03-16 PROCEDURE — 99284 EMERGENCY DEPT VISIT MOD MDM: CPT | Performed by: EMERGENCY MEDICINE

## 2024-03-16 NOTE — DISCHARGE INSTRUCTIONS
A referral was placed for a cardiologist, you should schedule a follow-up appointment.    Return to the ER if symptoms worsen, increased chest pain, difficulty breathing, or any other symptoms that concern you.

## 2024-03-16 NOTE — ED ATTENDING ATTESTATION
3/16/2024  I, Attila Serrano MD, saw and evaluated the patient. I have discussed the patient with the resident/non-physician practitioner and agree with the resident's/non-physician practitioner's findings, Plan of Care, and MDM as documented in the resident's/non-physician practitioner's note, except where noted. All available labs and Radiology studies were reviewed.  I was present for key portions of any procedure(s) performed by the resident/non-physician practitioner and I was immediately available to provide assistance.       At this point I agree with the current assessment done in the Emergency Department.  I have conducted an independent evaluation of this patient a history and physical is as follows:  Chest discomfort   Several days to 1 week   left sided   no radiation   No sob   Not pleruitic   Nausea  no vomiting no sweats   no pe or dvt risk  Crf  HLD   and FH    of CAD  Takes asa on a daily basis  EXAM:   Const:   well appearing   NAD     HEENT:  NCAT    sclera anicteric conjunctiva pink   throat clear, MMM    Neck:   supple  no meningismus  no jvd   no bruits  no  midline tenderness   Lungs:   clear  CW non-tender   No creiptation  Heart:   RRR no m/g/r  Normal pulses  Abd:   soft nt nd pos bs   Ext:    normal nontender  No edema  Neruo:   CN 2 -12 intact  motor intact 5/5 sensory intact cerebellar intact       Gait normal    IMPRESSION: Chest pain  PLAN: EKG normal sinus without acute ischemic changes normal EKG  Troponin chest x-ray    ED Course         Critical Care Time  Procedures

## 2024-03-21 NOTE — ED PROVIDER NOTES
History  Chief Complaint   Patient presents with    Chest Pain     Pt c/o chest discomfort, mild dizziness for the past week.     HPI  Prasad Langley is a 58 y.o. male with PMH HLD who presents to the emergency department with vague chest discomfort for the past week.  His symptoms are nonexertional and nonradiating.  He denies shortness of breath, abdominal pain, vomiting, or diaphoresis.  Denies any personal cardiac history but reports his father had heart disease around his age.  He denies history of blood clots, recent prolonged travel, surgery, leg swelling, hemoptysis, history of malignancy, or recent smoking.  He takes 81 mg aspirin daily.    Prior to Admission Medications   Prescriptions Last Dose Informant Patient Reported? Taking?   Green Tea 150 MG CAPS  Self Yes No   Sig: Take by mouth daily   Multiple Vitamins-Minerals (MULTIVITAMIN ADULT) TABS  Self Yes No   Sig: Take 1 tablet by mouth daily   Omega-3 Fatty Acids ([employer] FISH OIL) 1000 MG CAPS  Self Yes No   Sig: Take 2 capsules by mouth 2 (two) times a day   Turmeric Curcumin 500 MG CAPS  Self Yes No   Sig: Take 1 capsule by mouth daily   aspirin 81 MG tablet  Self Yes No   Sig: Take 1 tablet by mouth daily   atorvastatin (LIPITOR) 20 mg tablet  Self No No   Sig: Take 1 tablet (20 mg total) by mouth daily   buPROPion (WELLBUTRIN XL) 150 mg 24 hr tablet  Self No No   Sig: Take 1 tablet (150 mg total) by mouth daily   co-enzyme Q-10 30 MG capsule  Self Yes No   Sig: Take 30 mg by mouth daily   naproxen (Naprosyn) 500 mg tablet  Self No No   Sig: Take 1 tablet (500 mg total) by mouth 2 (two) times a day with meals   venlafaxine (EFFEXOR-XR) 150 mg 24 hr capsule  Self No No   Sig: Take 1 capsule (150 mg total) by mouth daily      Facility-Administered Medications: None       Past Medical History:   Diagnosis Date    Acute suppurative otitis media of left ear without spontaneous rupture of tympanic membrane 01/22/2018    Anxiety     Depression     HL (hearing  loss)     Hyperlipidemia     Hypertension     Obesity (BMI 35.0-39.9 without comorbidity) 2017       Past Surgical History:   Procedure Laterality Date    CO OPEN TREATMENT METATARSAL FRACTURE EACH Right 2023    Procedure: OPEN REDUCTION W/ INTERNAL FIXATION (ORIF) FOOT, ORIF dupree fracture 5th met;  Surgeon: Pramod Nuñez DPM;  Location: [facility];  Service: Podiatry    WISDOM TOOTH EXTRACTION         Family History   Problem Relation Age of Onset    Diabetes Father         borderline    Hypothyroidism Father     Prostate cancer Father     Heart disease Father     Skin cancer Father     Dementia Father     Hypothyroidism Sister     Fibromyalgia Sister     Heart disease Sister     Ovarian cancer Mother     Skin cancer Mother      I have reviewed and agree with the history as documented.    E-Cigarette/Vaping    E-Cigarette Use Never User      E-Cigarette/Vaping Substances    Nicotine No     THC No     CBD No     Flavoring No     Other No     Unknown No      Social History     Tobacco Use    Smoking status: Former     Current packs/day: 0.00     Average packs/day: 0.5 packs/day for 25.9 years (13.0 ttl pk-yrs)     Types: Cigarettes     Start date:      Quit date: 2015     Years since quittin.3    Smokeless tobacco: Never   Vaping Use    Vaping status: Never Used   Substance Use Topics    Alcohol use: Yes     Alcohol/week: 4.0 standard drinks of alcohol     Types: 4 Cans of beer per week    Drug use: Never       Home medications:  Prior to Admission Medications   Prescriptions Last Dose Informant Patient Reported? Taking?   Green Tea 150 MG CAPS  Self Yes No   Sig: Take by mouth daily   Multiple Vitamins-Minerals (MULTIVITAMIN ADULT) TABS  Self Yes No   Sig: Take 1 tablet by mouth daily   Omega-3 Fatty Acids ([employer] FISH OIL) 1000 MG CAPS  Self Yes No   Sig: Take 2 capsules by mouth 2 (two) times a day   Turmeric Curcumin 500 MG CAPS  Self Yes No   Sig: Take 1 capsule by mouth daily    aspirin 81 MG tablet  Self Yes No   Sig: Take 1 tablet by mouth daily   atorvastatin (LIPITOR) 20 mg tablet  Self No No   Sig: Take 1 tablet (20 mg total) by mouth daily   buPROPion (WELLBUTRIN XL) 150 mg 24 hr tablet  Self No No   Sig: Take 1 tablet (150 mg total) by mouth daily   co-enzyme Q-10 30 MG capsule  Self Yes No   Sig: Take 30 mg by mouth daily   naproxen (Naprosyn) 500 mg tablet  Self No No   Sig: Take 1 tablet (500 mg total) by mouth 2 (two) times a day with meals   venlafaxine (EFFEXOR-XR) 150 mg 24 hr capsule  Self No No   Sig: Take 1 capsule (150 mg total) by mouth daily      Facility-Administered Medications: None     Allergies:  No Known Allergies     Review of Systems   Constitutional:  Negative for diaphoresis and fever.   Respiratory:  Negative for cough and shortness of breath.    Cardiovascular:  Positive for chest pain. Negative for palpitations and leg swelling.   Gastrointestinal:  Negative for abdominal pain, nausea and vomiting.   Neurological:  Negative for weakness and numbness.   All other systems reviewed and are negative.      Physical Exam  ED Triage Vitals [03/16/24 1041]   Temperature Pulse Respirations Blood Pressure SpO2   98.6 °F (37 °C) 72 18 126/99 99 %      Temp Source Heart Rate Source Patient Position - Orthostatic VS BP Location FiO2 (%)   Oral Monitor Lying Left arm --      Pain Score       3             Orthostatic Vital Signs  Vitals:    03/16/24 1041 03/16/24 1200 03/16/24 1500   BP: 126/99 130/75 126/63   Pulse: 72 66 64   Patient Position - Orthostatic VS: Lying Lying Lying       Physical Exam  Vitals and nursing note reviewed.   Constitutional:       General: He is not in acute distress.     Appearance: He is not toxic-appearing or diaphoretic.   HENT:      Head: Normocephalic.      Mouth/Throat:      Mouth: Mucous membranes are moist.   Eyes:      Pupils: Pupils are equal, round, and reactive to light.   Cardiovascular:      Rate and Rhythm: Normal rate and  regular rhythm.      Heart sounds: No murmur heard.  Pulmonary:      Effort: Pulmonary effort is normal. No respiratory distress.      Breath sounds: Normal breath sounds. No wheezing, rhonchi or rales.   Abdominal:      General: Abdomen is flat. There is no distension.      Palpations: Abdomen is soft.      Tenderness: There is no abdominal tenderness. There is no guarding or rebound.   Musculoskeletal:      Right lower leg: No edema.      Left lower leg: No edema.   Skin:     General: Skin is warm and dry.   Neurological:      Mental Status: He is alert.         ED Medications  Medications - No data to display    Diagnostic Studies  Results Reviewed       Procedure Component Value Units Date/Time    HS Troponin I 2hr [[id number]]  (Normal) Collected: 03/16/24 1416    Lab Status: Final result Specimen: Blood from Arm, Left Updated: 03/16/24 1457     hs TnI 2hr 3 ng/L      Delta 2hr hsTnI -2 ng/L     HS Troponin 0hr (reflex protocol) [[id number]]  (Normal) Collected: 03/16/24 1134    Lab Status: Final result Specimen: Blood from Arm, Left Updated: 03/16/24 1210     hs TnI 0hr 5 ng/L     Comprehensive metabolic panel [[id number]]  (Abnormal) Collected: 03/16/24 1134    Lab Status: Final result Specimen: Blood from Arm, Left Updated: 03/16/24 1205     Sodium 137 mmol/L      Potassium 4.6 mmol/L      Chloride 104 mmol/L      CO2 27 mmol/L      ANION GAP 6 mmol/L      BUN 13 mg/dL      Creatinine 0.92 mg/dL      Glucose 122 mg/dL      Calcium 9.0 mg/dL      AST 22 U/L      ALT 25 U/L      Alkaline Phosphatase 45 U/L      Total Protein 6.9 g/dL      Albumin 4.3 g/dL      Total Bilirubin 1.08 mg/dL      eGFR 92 ml/min/1.73sq m     Narrative:      National Kidney Disease Foundation guidelines for Chronic Kidney Disease (CKD):     Stage 1 with normal or high GFR (GFR > 90 mL/min/1.73 square meters)    Stage 2 Mild CKD (GFR = 60-89 mL/min/1.73 square meters)    Stage 3A Moderate CKD (GFR = 45-59 mL/min/1.73 square meters)     Stage 3B Moderate CKD (GFR = 30-44 mL/min/1.73 square meters)    Stage 4 Severe CKD (GFR = 15-29 mL/min/1.73 square meters)    Stage 5 End Stage CKD (GFR <15 mL/min/1.73 square meters)  Note: GFR calculation is accurate only with a steady state creatinine    CBC and differential [[id number]] Collected: 03/16/24 1134    Lab Status: Final result Specimen: Blood from Arm, Left Updated: 03/16/24 1142     WBC 6.40 Thousand/uL      RBC 4.45 Million/uL      Hemoglobin 13.9 g/dL      Hematocrit 43.4 %      MCV 98 fL      MCH 31.2 pg      MCHC 32.0 g/dL      RDW 14.1 %      MPV 10.5 fL      Platelets 184 Thousands/uL      nRBC 0 /100 WBCs      Neutrophils Relative 55 %      Immature Grans % 0 %      Lymphocytes Relative 33 %      Monocytes Relative 8 %      Eosinophils Relative 3 %      Basophils Relative 1 %      Neutrophils Absolute 3.52 Thousands/µL      Absolute Immature Grans 0.02 Thousand/uL      Absolute Lymphocytes 2.14 Thousands/µL      Absolute Monocytes 0.53 Thousand/µL      Eosinophils Absolute 0.16 Thousand/µL      Basophils Absolute 0.03 Thousands/µL                    XR chest 2 views   Final Result by Jayleen Draper MD (03/17 0642)      No acute cardiopulmonary disease.               Workstation performed: [id number]               Procedures  Procedures      ED Course             HEART Risk Score      Flowsheet Row Most Recent Value   Heart Score Risk Calculator    History 1 Filed at: 03/16/2024 1529   ECG 0 Filed at: 03/16/2024 1529   Age 1 Filed at: 03/16/2024 1529   Risk Factors 2 Filed at: 03/16/2024 1529   Troponin 0 Filed at: 03/16/2024 1529   HEART Score 4 Filed at: 03/16/2024 1529                        SBIRT 20yo+      Flowsheet Row Most Recent Value   Initial Alcohol Screen: US AUDIT-C     1. How often do you have a drink containing alcohol? 0 Filed at: 03/16/2024 1041   2. How many drinks containing alcohol do you have on a typical day you are drinking?  0 Filed at: 03/16/2024 1041   3a. Male  UNDER 65: How often do you have five or more drinks on one occasion? 0 Filed at: 03/16/2024 1041   3b. FEMALE Any Age, or MALE 65+: How often do you have 4 or more drinks on one occassion? 0 Filed at: 03/16/2024 1041   Audit-C Score 0 Filed at: 03/16/2024 1041   EMMANUEL: How many times in the past year have you...    Used an illegal drug or used a prescription medication for non-medical reasons? Never Filed at: 03/16/2024 1041                  [facility]  Medical Decision Making  Amount and/or Complexity of Data Reviewed  Labs: ordered.  Radiology: ordered.      Prasad Langley is a 58 y.o. male with PMH HLD who presents to the emergency department with nonradiating nonexertional chest discomfort for the past week. Workup including vital signs, physical exam, EKG, CXR, and labs. EKG without acute ischemic changes, CXR without acute cardiopulmonary abnormalities, and labs largely unremarkable.  Delta troponin negative.  Stable for discharge, will provide referral for cardiology given family history and risk factors.  Strict return precautions provided in case of worsening symptoms. Stable for discharge home with outpatient follow up, discharge instructions and return precautions given.       Disposition  Final diagnoses:   Chest pain     Time reflects when diagnosis was documented in both MDM as applicable and the Disposition within this note       Time User Action Codes Description Comment    3/16/2024  3:28 PM Giovanni Sheridan Add [R07.9] Chest pain           ED Disposition       ED Disposition   Discharge    Condition   Stable    Date/Time   Sat Mar 16, 2024 1530    Comment   Prasad Langley discharge to home/self care.                   Follow-up Information       Follow up With Specialties Details Why Contact Info Additional Information    [facility] Call   [address]  [facility] [address]  [phone] [facility] [place], [address],  "[place], [place], [address] [phone]            Discharge Medication List as of 3/16/2024  3:30 PM        CONTINUE these medications which have NOT CHANGED    Details   aspirin 81 MG tablet Take 1 tablet by mouth daily, Historical Med      atorvastatin (LIPITOR) 20 mg tablet Take 1 tablet (20 mg total) by mouth daily, Starting Mon 1/8/2024, Normal      buPROPion (WELLBUTRIN XL) 150 mg 24 hr tablet Take 1 tablet (150 mg total) by mouth daily, Starting Thu 11/2/2023, Normal      co-enzyme Q-10 30 MG capsule Take 30 mg by mouth daily, Historical Med      Green Tea 150 MG CAPS Take by mouth daily, Historical Med      Multiple Vitamins-Minerals (MULTIVITAMIN ADULT) TABS Take 1 tablet by mouth daily, Historical Med      naproxen (Naprosyn) 500 mg tablet Take 1 tablet (500 mg total) by mouth 2 (two) times a day with meals, Starting Wed 1/10/2024, Normal      Omega-3 Fatty Acids ([employer] FISH OIL) 1000 MG CAPS Take 2 capsules by mouth 2 (two) times a day, Historical Med      Turmeric Curcumin 500 MG CAPS Take 1 capsule by mouth daily, Historical Med      venlafaxine (EFFEXOR-XR) 150 mg 24 hr capsule Take 1 capsule (150 mg total) by mouth daily, Starting Mon 1/8/2024, Normal                 PDMP Review         Value Time User    PDMP Reviewed  Yes 1/20/2023 12:20 PM Luis Saenz DPM             ED Provider  Attending physically available and evaluated Prasad Langley. I managed the patient along with the ED Attending.    Electronically Signed by    Portions of the record may have been created with voice recognition software.  Occasional wrong word or \"sound a like\" substitutions may have occurred due to the inherent limitations of voice recognition software.  Read the chart carefully and recognize, using context, where substitutions have occurred       Giovanni Sheridan MD  03/21/24 1345    "

## 2024-04-24 ENCOUNTER — OFFICE VISIT (OUTPATIENT)
Dept: PODIATRY | Facility: CLINIC | Age: 59
End: 2024-04-24
Payer: COMMERCIAL

## 2024-04-24 VITALS
BODY MASS INDEX: 37.44 KG/M2 | HEART RATE: 80 BPM | WEIGHT: 267.4 LBS | SYSTOLIC BLOOD PRESSURE: 137 MMHG | HEIGHT: 71 IN | DIASTOLIC BLOOD PRESSURE: 77 MMHG

## 2024-04-24 DIAGNOSIS — D36.10 NEUROMA: Primary | ICD-10-CM

## 2024-04-24 DIAGNOSIS — Q66.221 METATARSUS ADDUCTUS OF RIGHT FOOT: ICD-10-CM

## 2024-04-24 PROCEDURE — 99213 OFFICE O/P EST LOW 20 MIN: CPT | Performed by: PODIATRIST

## 2024-04-24 NOTE — PROGRESS NOTES
"Assessment/Plan:    No problem-specific Assessment & Plan notes found for this encounter.       Diagnoses and all orders for this visit:    Neuroma    Metatarsus adductus of right foot        -I referred him to physical therapy for new custom orthotics  - Doing well with his conservative care  - Happy with metatarsal pads  - Advised to incorporate permanent metatarsal pads into his custom orthotics  -Return as needed for continued care  Subjective:      Patient ID: Prasad Langley is a 59 y.o. male.    Patient presents for evaluation and management of hier right foot. Overall very happy with his relief of pain.  Pain does correlate with activity and his metatarsal pads are working well for him not permanent and very happy with his custom orthotics        The following portions of the patient's history were reviewed and updated as appropriate: allergies, current medications, past family history, past medical history, past social history, past surgical history, and problem list.    Review of Systems   Constitutional:  Negative for chills and fever.   HENT:  Negative for ear pain and sore throat.    Eyes:  Negative for pain and visual disturbance.   Respiratory:  Negative for cough and shortness of breath.    Cardiovascular:  Negative for chest pain and palpitations.   Gastrointestinal:  Negative for abdominal pain and vomiting.   Genitourinary:  Negative for dysuria and hematuria.   Musculoskeletal:  Negative for arthralgias and back pain.   Skin:  Negative for color change and rash.   Neurological:  Negative for seizures and syncope.   All other systems reviewed and are negative.        Objective:      /77   Pulse 80   Ht 5' 11\" (1.803 m)   Wt 121 kg (267 lb 6.4 oz)   BMI 37.29 kg/m²          Physical Exam  Musculoskeletal:      Comments: Trigger point has resolved, decently normal exam.  Continue chronic foot deformity           "

## 2024-04-25 ENCOUNTER — OFFICE VISIT (OUTPATIENT)
Dept: CARDIOLOGY CLINIC | Facility: CLINIC | Age: 59
End: 2024-04-25
Payer: COMMERCIAL

## 2024-04-25 VITALS
WEIGHT: 263 LBS | BODY MASS INDEX: 36.82 KG/M2 | HEART RATE: 70 BPM | SYSTOLIC BLOOD PRESSURE: 132 MMHG | OXYGEN SATURATION: 98 % | HEIGHT: 71 IN | DIASTOLIC BLOOD PRESSURE: 74 MMHG

## 2024-04-25 DIAGNOSIS — Z87.891 FORMER SMOKER: ICD-10-CM

## 2024-04-25 DIAGNOSIS — E78.5 HYPERLIPIDEMIA, UNSPECIFIED HYPERLIPIDEMIA TYPE: ICD-10-CM

## 2024-04-25 DIAGNOSIS — Z82.49 FAMILY HISTORY OF PREMATURE CAD: ICD-10-CM

## 2024-04-25 DIAGNOSIS — R07.9 CHEST PAIN: Primary | ICD-10-CM

## 2024-04-25 LAB
ALBUMIN SERPL-MCNC: 4.5 G/DL (ref 3.6–5.1)
ALBUMIN/GLOB SERPL: 1.8 (CALC) (ref 1–2.5)
ALP SERPL-CCNC: 46 U/L (ref 35–144)
ALT SERPL-CCNC: 28 U/L (ref 9–46)
AST SERPL-CCNC: 18 U/L (ref 10–35)
BILIRUB SERPL-MCNC: 1 MG/DL (ref 0.2–1.2)
BUN SERPL-MCNC: 16 MG/DL (ref 7–25)
BUN/CREAT SERPL: ABNORMAL (CALC) (ref 6–22)
CALCIUM SERPL-MCNC: 9.4 MG/DL (ref 8.6–10.3)
CHLORIDE SERPL-SCNC: 102 MMOL/L (ref 98–110)
CHOLEST SERPL-MCNC: 161 MG/DL
CHOLEST/HDLC SERPL: 2.7 (CALC)
CO2 SERPL-SCNC: 27 MMOL/L (ref 20–32)
CREAT SERPL-MCNC: 0.91 MG/DL (ref 0.7–1.3)
GFR/BSA.PRED SERPLBLD CYS-BASED-ARV: 98 ML/MIN/1.73M2
GLOBULIN SER CALC-MCNC: 2.5 G/DL (CALC) (ref 1.9–3.7)
GLUCOSE SERPL-MCNC: 116 MG/DL (ref 65–99)
HBA1C MFR BLD: 6.2 % OF TOTAL HGB
HDLC SERPL-MCNC: 60 MG/DL
LDLC SERPL CALC-MCNC: 79 MG/DL (CALC)
NONHDLC SERPL-MCNC: 101 MG/DL (CALC)
POTASSIUM SERPL-SCNC: 4.5 MMOL/L (ref 3.5–5.3)
PROT SERPL-MCNC: 7 G/DL (ref 6.1–8.1)
SODIUM SERPL-SCNC: 137 MMOL/L (ref 135–146)
TRIGL SERPL-MCNC: 122 MG/DL

## 2024-04-25 PROCEDURE — 99204 OFFICE O/P NEW MOD 45 MIN: CPT | Performed by: INTERNAL MEDICINE

## 2024-04-25 NOTE — PATIENT INSTRUCTIONS
You were seen today in the Cardiology office for chest pain. Please have an exercise stress echo test prior to follow up. We will contact you with the results.    Thank you for choosing [facility].

## 2024-04-25 NOTE — PROGRESS NOTES
[facility]    CHIEF COMPLAINT:   Chief Complaint   Patient presents with    Advice Only     NP- was in ED for CP last month- No longer having those symptoms. Wanted to get checked out by Cardio- has cardiac family hx.       HPI:  Prasad Langley is a 59 y.o. male with a past medical history of former smoker, obesity, hypertension, hyperlipidemia.  He presents today in consultation for chest pain.    Briefly, he was seen in the emergency department on 3/16/2024 for chest discomfort.  Vital signs on presentation include heart rate 72 bpm, respirations 18, blood pressure 126/99, SpO2 98% on room air.  ECG shows sinus rhythm with nonspecific T wave abnormality.  Lab work was unremarkable.  High-sensitivity troponin 5, 3.    He was in his baseline state of health and preparing to travel for work. He developed generalized discomfort in the center of the chest. He describes this as a knife-like or sharp discomfort. No radiation of discomfort but somewhat poorly localized. He denies any associated nausea, vomiting, diaphoresis. Does report associated shortness of breath. No clear exertional or positional component. No preceding fever, chills, URI symptoms, GI illness.     Further denies any visual changes, lightheadedness, syncope, palpitations, orthopnea, PND, nausea, vomiting, diarrhea, lower extremity swelling    Social history: Former smoker  Family history: Father had triple bypass before the age of 55. Mother without known cardiac problems. Sister had MI in early 60s.      The following portions of the patient's history were reviewed and updated as appropriate: allergies, current medications, past family history, past medical history, past social history, past surgical history, and problem list.    SINCE LAST OV I REVIEWED WITH THE PATIENT THE INTERIM LABS, TEST RESULTS, CONSULTANT(S) NOTES AND PERFORMED AN INTERIM REVIEW OF HISTORY    Past Medical History:   Diagnosis Date    Acute suppurative  otitis media of left ear without spontaneous rupture of tympanic membrane 2018    Anxiety     Depression     HL (hearing loss)     Hyperlipidemia     Hypertension     Obesity (BMI 35.0-39.9 without comorbidity) 2017       Past Surgical History:   Procedure Laterality Date    NM OPEN TREATMENT METATARSAL FRACTURE EACH Right 2023    Procedure: OPEN REDUCTION W/ INTERNAL FIXATION (ORIF) FOOT, ORIF dupree fracture 5th met;  Surgeon: Pramod Nuñez DPM;  Location: [facility];  Service: Podiatry    WISDOM TOOTH EXTRACTION         Social History     Socioeconomic History    Marital status:      Spouse name: Not on file    Number of children: Not on file    Years of education: Not on file    Highest education level: Not on file   Occupational History    Not on file   Tobacco Use    Smoking status: Former     Current packs/day: 0.00     Average packs/day: 0.5 packs/day for 25.9 years (13.0 ttl pk-yrs)     Types: Cigarettes     Start date:      Quit date: 2015     Years since quittin.4    Smokeless tobacco: Never   Vaping Use    Vaping status: Never Used   Substance and Sexual Activity    Alcohol use: Yes     Alcohol/week: 4.0 standard drinks of alcohol     Types: 4 Cans of beer per week    Drug use: Never    Sexual activity: Yes   Other Topics Concern    Not on file   Social History Narrative    Not on file     Social Determinants of Health     Financial Resource Strain: Not on file   Food Insecurity: Not on file   Transportation Needs: Not on file   Physical Activity: Not on file   Stress: Not on file   Social Connections: Not on file   Intimate Partner Violence: Not on file   Housing Stability: Not on file       Family History   Problem Relation Age of Onset    Diabetes Father         borderline    Hypothyroidism Father     Prostate cancer Father     Heart disease Father     Skin cancer Father     Dementia Father     Hypothyroidism Sister     Fibromyalgia Sister     Heart  "disease Sister     Ovarian cancer Mother     Skin cancer Mother        No Known Allergies    Current Outpatient Medications   Medication Sig Dispense Refill    aspirin 81 MG tablet Take 1 tablet by mouth daily      atorvastatin (LIPITOR) 20 mg tablet Take 1 tablet (20 mg total) by mouth daily 90 tablet 1    buPROPion (WELLBUTRIN XL) 150 mg 24 hr tablet Take 1 tablet (150 mg total) by mouth daily 90 tablet 1    co-enzyme Q-10 30 MG capsule Take 30 mg by mouth daily      Green Tea 150 MG CAPS Take by mouth daily      Multiple Vitamins-Minerals (MULTIVITAMIN ADULT) TABS Take 1 tablet by mouth daily      naproxen (Naprosyn) 500 mg tablet Take 1 tablet (500 mg total) by mouth 2 (two) times a day with meals (Patient taking differently: Take 500 mg by mouth 2 (two) times a day with meals PRN) 30 tablet 0    Omega-3 Fatty Acids ([employer] FISH OIL) 1000 MG CAPS Take 2 capsules by mouth 2 (two) times a day      Turmeric Curcumin 500 MG CAPS Take 1 capsule by mouth daily      venlafaxine (EFFEXOR-XR) 150 mg 24 hr capsule Take 1 capsule (150 mg total) by mouth daily 90 capsule 1     No current facility-administered medications for this visit.       /74 (BP Location: Right arm, Patient Position: Sitting, Cuff Size: Large)   Pulse 70   Ht 5' 11\" (1.803 m)   Wt 119 kg (263 lb)   SpO2 98%   BMI 36.68 kg/m²     Review of Systems   All other systems reviewed and are negative.      Physical Exam  Vitals reviewed.   Constitutional:       General: He is not in acute distress.     Appearance: He is well-developed. He is not toxic-appearing.   HENT:      Head: Normocephalic and atraumatic.   Eyes:      General: No scleral icterus.     Extraocular Movements: Extraocular movements intact.      Conjunctiva/sclera: Conjunctivae normal.   Cardiovascular:      Rate and Rhythm: Normal rate and regular rhythm.      Pulses: Normal pulses.      Heart sounds: Normal heart sounds. No murmur heard.     No gallop.   Pulmonary:      Effort: " Pulmonary effort is normal. No respiratory distress.      Breath sounds: Normal breath sounds. No wheezing or rales.   Abdominal:      General: Bowel sounds are normal. There is no distension.      Palpations: Abdomen is soft.      Tenderness: There is no abdominal tenderness. There is no guarding.   Musculoskeletal:      Right lower leg: No edema.      Left lower leg: No edema.   Skin:     General: Skin is warm and dry.      Capillary Refill: Capillary refill takes less than 2 seconds.      Coloration: Skin is not jaundiced or pale.   Neurological:      Mental Status: He is alert.   Psychiatric:         Mood and Affect: Mood normal.          Lab Results   Component Value Date     10/14/2017     10/14/2017    K 4.6 03/16/2024     03/16/2024    CO2 27 03/16/2024    BUN 13 03/16/2024    CREATININE 0.92 03/16/2024    CALCIUM 9.0 03/16/2024    ALT 25 03/16/2024    AST 22 03/16/2024       Lab Results   Component Value Date    CHOL 167 10/14/2017    CHOL 167 10/14/2017    HDL 61 10/30/2023    LDLCALC 74 10/30/2023    TRIG 122 10/30/2023       Lab Results   Component Value Date    WBC 6.40 03/16/2024    HGB 13.9 03/16/2024    HCT 43.4 03/16/2024     03/16/2024       Lab Results   Component Value Date     09/24/2022    EAG 7.0 09/24/2022    HGBA1C 6.0 (H) 09/24/2022     Cardiac studies:   NM SPECT-8/12/2013:  The patient exercised to 106% of maximal predicted heart rate. There   is mild fixed decreased labeling with apical thinning. There is mild   fixed decreased perfusion overlying the inferior wall on the   tomographic images which thickens on the gated portion of the study   most consistent with diaphragm attenuation in this region. No other   fixed or reversible perfusion defects are demonstrated. Left   ventricle is normal in size. There is normal wall motion. The   calculated left ventricular ejection fraction is 56% which is above   the normal lower limit of 45%.     ASSESSMENT AND  PLAN:  Prasad was seen today for advice only.    Diagnoses and all orders for this visit:    Chest pain  Former smoker  Family history of premature CAD  Chest pain symptoms are somewhat vague. He still gets occasional chest pain in the center of his chest. No clear aggravating factors. He has a history of hyperlipidemia. He reports being on/off BP medications in the past. The standard risk calculations underestimate his true risk, which is at least intermediate when taking into account smoking history and family history of premature CAD. Baseline ECG has mild T abnormalities and HS troponin negative during initial work up. Recommend exercise stress echocardiogram for further evaluation and risk stratification.  -     Ambulatory Referral to Cardiology  -     Echo stress test, exercise; Future    Hyperlipidemia, unspecified hyperlipidemia type: Lipid panel from 9/2022 shows total cholesterol 227, triglycerides 163, HDL 56, .  Repeat lipid panel from 10/2023 shows total cholesterol 155, triglycerides 122, HDL 61, LDL 74.    -At goal with current medical therapy - atorvastatin 20 mg and omega-3 FA's 2 g twice daily    Dyana Hernandez MD

## 2024-05-02 ENCOUNTER — OFFICE VISIT (OUTPATIENT)
Dept: INTERNAL MEDICINE CLINIC | Facility: OTHER | Age: 59
End: 2024-05-02
Payer: COMMERCIAL

## 2024-05-02 VITALS
HEIGHT: 71 IN | BODY MASS INDEX: 36.88 KG/M2 | HEART RATE: 80 BPM | OXYGEN SATURATION: 95 % | DIASTOLIC BLOOD PRESSURE: 78 MMHG | TEMPERATURE: 98.1 F | SYSTOLIC BLOOD PRESSURE: 130 MMHG | WEIGHT: 263.4 LBS

## 2024-05-02 DIAGNOSIS — E78.5 DYSLIPIDEMIA: ICD-10-CM

## 2024-05-02 DIAGNOSIS — R73.03 PREDIABETES: ICD-10-CM

## 2024-05-02 DIAGNOSIS — Z13.0 SCREENING FOR DEFICIENCY ANEMIA: ICD-10-CM

## 2024-05-02 DIAGNOSIS — G47.33 OSA (OBSTRUCTIVE SLEEP APNEA): ICD-10-CM

## 2024-05-02 DIAGNOSIS — F32.A DEPRESSION, UNSPECIFIED DEPRESSION TYPE: Primary | ICD-10-CM

## 2024-05-02 DIAGNOSIS — E66.9 OBESITY (BMI 35.0-39.9 WITHOUT COMORBIDITY): ICD-10-CM

## 2024-05-02 DIAGNOSIS — Z12.5 SCREENING FOR PROSTATE CANCER: ICD-10-CM

## 2024-05-02 DIAGNOSIS — Z82.49 FAMILY HISTORY OF PREMATURE CAD: ICD-10-CM

## 2024-05-02 PROBLEM — Z00.00 ANNUAL PHYSICAL EXAM: Status: RESOLVED | Noted: 2023-11-02 | Resolved: 2024-05-02

## 2024-05-02 PROCEDURE — 3725F SCREEN DEPRESSION PERFORMED: CPT | Performed by: NURSE PRACTITIONER

## 2024-05-02 PROCEDURE — 99214 OFFICE O/P EST MOD 30 MIN: CPT | Performed by: NURSE PRACTITIONER

## 2024-05-02 RX ORDER — BUPROPION HYDROCHLORIDE 150 MG/1
150 TABLET ORAL DAILY
Qty: 90 TABLET | Refills: 1 | Status: SHIPPED | OUTPATIENT
Start: 2024-05-02

## 2024-05-02 NOTE — ASSESSMENT & PLAN NOTE
-Continue wellbutrin XL 150mg and and Effexor XR 75mg daily   -offered medication adjustments but pt would like to continue current regimen  - on waitlist for psychiatry

## 2024-05-02 NOTE — ASSESSMENT & PLAN NOTE
-Patient following with cardiology and scheduled for stress echo  -Patient has history of smoking, quit 6 years ago  -Family history of triple bypass in his father before age 55, history of MI in his sister in early 60s

## 2024-05-02 NOTE — PROGRESS NOTES
Assessment/Plan:    Problem List Items Addressed This Visit       Depression - Primary     -Continue wellbutrin XL 150mg and and Effexor XR 75mg daily   -offered medication adjustments but pt would like to continue current regimen  - on waitlist for psychiatry          Relevant Medications    buPROPion (WELLBUTRIN XL) 150 mg 24 hr tablet    Dyslipidemia     - lipid panel at goal on atorvastatin 20mg daily and omega-3 fish oil         Relevant Orders    Comprehensive metabolic panel    Lipid Panel with Direct LDL reflex    TSH, 3rd generation with Free T4 reflex    Obesity (BMI 35.0-39.9 without comorbidity)     Encouraged weight loss through diet and exercise          Prediabetes     Hba1c 6.2  Discussed diet modifications and importance of routine exercise          Relevant Orders    Hemoglobin A1C    Comprehensive metabolic panel    ELMER (obstructive sleep apnea)     Compliant with cpap          Family history of premature CAD     -Patient following with cardiology and scheduled for stress echo  -Patient has history of smoking, quit 6 years ago  -Family history of triple bypass in his father before age 55, history of MI in his sister in early 60s          Other Visit Diagnoses       Screening for prostate cancer        Relevant Orders    PSA, Total Screen    Screening for deficiency anemia        Relevant Orders    CBC and differential            Depression Screening Follow-up Plan: Patient's depression screening was positive with a PHQ-2 score of . Their PHQ-9 score was 4. Patient assessed for underlying major depression. They have no active suicidal ideations. Brief counseling provided and recommend additional follow-up/re-evaluation next office visit.    M*Modal software was used to dictate this note.  It may contain errors with dictating incorrect words or incorrect spelling. Please contact the provider directly with any questions.    Subjective:      Patient ID: Prasad Langley is a 59 y.o.  male.    HPI    Patient presents today for routine 6-month follow-up  Labs completed 4/25  Since the last visit he was seen in the ED in March with chest pain.  Cardiac workup in the ED included EKG without acute ischemic changes, CXR unremarkable, troponins normal.   He followed up with cardiology  on 4/25 and was ordered an echo stress test  Patient does have a family history significant for premature CAD in his father who had triple bypass surgery before the age of 55 and his sister who had an MI in her early 60s.   Patient also has a history of smoking and quit approximately 6 years ago.     Anxiety/depression -he is currently on a wait list to see psychiatry.  He has been on the same regimen of Wellbutrin and Effexor for over the last 10 years.  He still gets breakthrough anxiety and depressive symptoms but does not want to change his medication regimen today  No SI or HI    Hyperlipidemia-total cholesterol 161, HDL 60, triglycerides 122, LDL 79  Managed on atorvastatin 20 mg and fish oil      Hemoglobin A1c increased to 6.2    The following portions of the patient's history were reviewed and updated as appropriate: allergies, current medications, past family history, past medical history, past social history, past surgical history, and problem list.    Review of Systems   Respiratory:  Negative for cough, chest tightness, shortness of breath and wheezing.    Cardiovascular:  Positive for chest pain (occasional). Negative for palpitations and leg swelling.   Gastrointestinal:  Negative for abdominal pain, diarrhea, nausea and vomiting.   Neurological:  Negative for dizziness and light-headedness.   Psychiatric/Behavioral:  Positive for dysphoric mood. Negative for self-injury and suicidal ideas. The patient is nervous/anxious.          Past Medical History:   Diagnosis Date    Acute suppurative otitis media of left ear without spontaneous rupture of tympanic membrane 01/22/2018    Anxiety     Depression      HL (hearing loss)     Hyperlipidemia     Hypertension     Obesity (BMI 35.0-39.9 without comorbidity) 01/16/2017         Current Outpatient Medications:     aspirin 81 MG tablet, Take 1 tablet by mouth daily, Disp: , Rfl:     atorvastatin (LIPITOR) 20 mg tablet, Take 1 tablet (20 mg total) by mouth daily, Disp: 90 tablet, Rfl: 1    buPROPion (WELLBUTRIN XL) 150 mg 24 hr tablet, Take 1 tablet (150 mg total) by mouth daily, Disp: 90 tablet, Rfl: 1    co-enzyme Q-10 30 MG capsule, Take 30 mg by mouth daily, Disp: , Rfl:     Green Tea 150 MG CAPS, Take by mouth daily, Disp: , Rfl:     Multiple Vitamins-Minerals (MULTIVITAMIN ADULT) TABS, Take 1 tablet by mouth daily, Disp: , Rfl:     naproxen (Naprosyn) 500 mg tablet, Take 1 tablet (500 mg total) by mouth 2 (two) times a day with meals (Patient taking differently: Take 500 mg by mouth 2 (two) times a day with meals PRN), Disp: 30 tablet, Rfl: 0    Omega-3 Fatty Acids ([employer] FISH OIL) 1000 MG CAPS, Take 2 capsules by mouth 2 (two) times a day, Disp: , Rfl:     Turmeric Curcumin 500 MG CAPS, Take 1 capsule by mouth daily, Disp: , Rfl:     venlafaxine (EFFEXOR-XR) 150 mg 24 hr capsule, Take 1 capsule (150 mg total) by mouth daily, Disp: 90 capsule, Rfl: 1    No Known Allergies    Social History   Past Surgical History:   Procedure Laterality Date    [place] OPEN TREATMENT METATARSAL FRACTURE EACH Right 1/20/2023    Procedure: OPEN REDUCTION W/ INTERNAL FIXATION (ORIF) FOOT, ORIF dupree fracture 5th met;  Surgeon: Pramod Nuñez DPM;  Location: [facility];  Service: Podiatry    WISDOM TOOTH EXTRACTION       Family History   Problem Relation Age of Onset    Diabetes Father         borderline    Hypothyroidism Father     Prostate cancer Father     Heart disease Father     Skin cancer Father     Dementia Father     Hypothyroidism Sister     Fibromyalgia Sister     Heart disease Sister     Ovarian cancer Mother     Skin cancer Mother        Objective:  /78 (BP  "Location: Left arm, Patient Position: Sitting, Cuff Size: Large)   Pulse 80   Temp 98.1 °F (36.7 °C) (Temporal)   Ht 5' 11\" (1.803 m)   Wt 119 kg (263 lb 6.4 oz) Comment: with shoes on  SpO2 95% Comment: ra  BMI 36.74 kg/m²      Physical Exam  Vitals reviewed.   Constitutional:       General: He is not in acute distress.     Appearance: Normal appearance. He is obese. He is not diaphoretic.   HENT:      Head: Normocephalic and atraumatic.   Eyes:      Extraocular Movements: Extraocular movements intact.      Conjunctiva/sclera: Conjunctivae normal.      Pupils: Pupils are equal, round, and reactive to light.   Cardiovascular:      Rate and Rhythm: Normal rate and regular rhythm.      Heart sounds: Normal heart sounds. No murmur heard.  Pulmonary:      Effort: Pulmonary effort is normal. No respiratory distress.      Breath sounds: Normal breath sounds. No wheezing, rhonchi or rales.   Musculoskeletal:      Right lower leg: No edema.      Left lower leg: No edema.   Neurological:      Mental Status: He is alert and oriented to person, place, and time. Mental status is at baseline.   Psychiatric:         Mood and Affect: Mood normal.         Behavior: Behavior normal.         Thought Content: Thought content normal.         Judgment: Judgment normal.           "

## 2024-05-08 ENCOUNTER — HOSPITAL ENCOUNTER (OUTPATIENT)
Dept: NON INVASIVE DIAGNOSTICS | Facility: CLINIC | Age: 59
Discharge: HOME | End: 2024-05-08
Payer: COMMERCIAL

## 2024-05-08 VITALS
HEIGHT: 71 IN | HEART RATE: 71 BPM | DIASTOLIC BLOOD PRESSURE: 84 MMHG | SYSTOLIC BLOOD PRESSURE: 132 MMHG | WEIGHT: 263 LBS | BODY MASS INDEX: 36.82 KG/M2 | OXYGEN SATURATION: 98 %

## 2024-05-08 DIAGNOSIS — R07.9 CHEST PAIN: ICD-10-CM

## 2024-05-08 LAB
E WAVE DECELERATION TIME: 240 MS
E/A RATIO: 0.93
MAX HR PERCENT: 90 %
MAX HR: 148 BPM
MV PEAK A VEL: 0.74 M/S
MV PEAK E VEL: 69 CM/S
MV STENOSIS PRESSURE HALF TIME: 70 MS
MV VALVE AREA P 1/2 METHOD: 3.14
POST LVEF: 75 %
RATE PRESSURE PRODUCT: NORMAL
SL CV LV EF: 65
SL CV STRESS RECOVERY BP: NORMAL MMHG
SL CV STRESS RECOVERY HR: 77 BPM
SL CV STRESS RECOVERY O2 SAT: 98 %
SL CV STRESS STAGE REACHED: 2
STRESS ANGINA INDEX: 0
STRESS BASELINE BP: NORMAL MMHG
STRESS BASELINE HR: 71 BPM
STRESS O2 SAT REST: 98 %
STRESS PEAK HR: 148 BPM
STRESS POST ESTIMATED WORKLOAD: 7 METS
STRESS POST EXERCISE DUR MIN: 6 MIN
STRESS POST O2 SAT PEAK: 100 %
STRESS POST PEAK BP: 170 MMHG

## 2024-05-08 PROCEDURE — 93350 STRESS TTE ONLY: CPT | Performed by: INTERNAL MEDICINE

## 2024-05-08 PROCEDURE — 93350 STRESS TTE ONLY: CPT

## 2024-05-09 LAB
CHEST PAIN STATEMENT: NORMAL
MAX DIASTOLIC BP: 72 MMHG
MAX PREDICTED HEART RATE: 163 BPM
PROTOCOL NAME: NORMAL
STRESS POST EXERCISE DUR MIN: 6 MIN
STRESS POST EXERCISE DUR SEC: 0 SEC
STRESS POST PEAK HR: 148 BPM
STRESS POST PEAK SYSTOLIC BP: 186 MMHG
TARGET HR FORMULA: NORMAL
TEST INDICATION: NORMAL

## 2024-05-20 DIAGNOSIS — E78.5 DYSLIPIDEMIA: ICD-10-CM

## 2024-05-21 RX ORDER — ATORVASTATIN CALCIUM 20 MG/1
20 TABLET, FILM COATED ORAL DAILY
Qty: 90 TABLET | Refills: 3 | Status: SHIPPED | OUTPATIENT
Start: 2024-05-21

## 2024-06-05 DIAGNOSIS — F32.A DEPRESSION, UNSPECIFIED DEPRESSION TYPE: ICD-10-CM

## 2024-06-06 RX ORDER — VENLAFAXINE HYDROCHLORIDE 150 MG/1
150 CAPSULE, EXTENDED RELEASE ORAL DAILY
Qty: 90 CAPSULE | Refills: 3 | Status: SHIPPED | OUTPATIENT
Start: 2024-06-06

## 2024-07-02 ENCOUNTER — OFFICE VISIT (OUTPATIENT)
Dept: SLEEP CENTER | Facility: CLINIC | Age: 59
End: 2024-07-02
Payer: COMMERCIAL

## 2024-07-02 VITALS
HEIGHT: 71 IN | BODY MASS INDEX: 36.6 KG/M2 | OXYGEN SATURATION: 97 % | SYSTOLIC BLOOD PRESSURE: 138 MMHG | WEIGHT: 261.4 LBS | DIASTOLIC BLOOD PRESSURE: 80 MMHG | HEART RATE: 73 BPM

## 2024-07-02 DIAGNOSIS — G47.33 OSA (OBSTRUCTIVE SLEEP APNEA): Primary | ICD-10-CM

## 2024-07-02 PROCEDURE — 99213 OFFICE O/P EST LOW 20 MIN: CPT | Performed by: INTERNAL MEDICINE

## 2024-07-02 NOTE — LETTER
2024     BRYCE Waldron  [address]  [address]  [facility] [address]    Patient: Prasad Langley   YOB: 1965   Date of Visit: 2024       Dear Dr. Bledsoe:    Thank you for referring Prasad Langley to me for evaluation. Below are my notes for this consultation.    If you have questions, please do not hesitate to call me. I look forward to following your patient along with you.         Sincerely,        Ottoniel Butts DO        CC: No Recipients    Ottoniel Butts DO  2024  3:41 PM  Sign when Signing Visit  Progress Note - Sleep Medicine  Prasad Langley 59 y.o. male MRN: [id number]      Assessment/Plan  59 y.o. M with PMHx of anxiety, hyperlipidemia who comes in for management of ELMER.  1.  Moderate ELMER (AHI - 26.6) on AutoPAP 4-20 (DME - [employer], machine - Airsense 11).  With excellent compliance and good clinical response.        -  continue autoPAP pressure 4-20 cc of H2O pressure.        -  Supply order placed.        -  follow compliance annually.        -  Discussed in depth the results of the sleep study and treatment pitfalls prior to initiation.  Answered all questions regarding treatment    ______________________________________________________________________    HPI:    Prasad Langley presents today for follow-up for ELMER.  He continues to do well on his CPAP.  He has not had any trouble with pressure or mask issues.  He denies aerophagia or morning headache.  He feels refreshed when he wakes up in the morning.  He has not had any issues with the machine.        Social history updates:  Social History     Tobacco Use   Smoking Status Former   • Current packs/day: 0.00   • Average packs/day: 0.5 packs/day for 25.9 years (13.0 ttl pk-yrs)   • Types: Cigarettes   • Start date:    • Quit date: 2015   • Years since quittin.5   Smokeless Tobacco Never     Social History     Socioeconomic History   • Marital status:      Spouse  "name: Not on file   • Number of children: Not on file   • Years of education: Not on file   • Highest education level: Not on file   Occupational History   • Not on file   Tobacco Use   • Smoking status: Former     Current packs/day: 0.00     Average packs/day: 0.5 packs/day for 25.9 years (13.0 ttl pk-yrs)     Types: Cigarettes     Start date:      Quit date: 2015     Years since quittin.5   • Smokeless tobacco: Never   Vaping Use   • Vaping status: Never Used   Substance and Sexual Activity   • Alcohol use: Yes     Alcohol/week: 4.0 standard drinks of alcohol     Types: 4 Cans of beer per week   • Drug use: Never   • Sexual activity: Yes   Other Topics Concern   • Not on file   Social History Narrative   • Not on file     Social Determinants of Health     Financial Resource Strain: Not on file   Food Insecurity: Not on file   Transportation Needs: Not on file   Physical Activity: Not on file   Stress: Not on file   Social Connections: Not on file   Intimate Partner Violence: Not on file   Housing Stability: Not on file       PhysicalExamination:  Vitals:   /80   Pulse 73   Ht 5' 11\" (1.803 m)   Wt 119 kg (261 lb 6.4 oz)   SpO2 97%   BMI 36.46 kg/m²   RA  General: Pleasant, Awake alert and oriented x 3, conversant without conversational dyspnea, NAD, normal affect  HEENT:  PERRL, Sclera noninjected, nonicteric OU, Nares patent,  no craniofacial abnormalities, Mucous membranes, moist, no oral lesions, normal dentition  NECK: Trachea midline, no accessory muscle use, no stridor, no cervical or supraclavicular adenopathy, JVP not elevated  CARDIAC: Reg, single s1/S2, no m/r/g  PULM: CTA bilaterally no wheezing, rhonchi or rales  ABD: Normoactive bowel sounds, soft nontender, nondistended, no rebound, no rigidity, no guarding  EXT: No cyanosis, no clubbing, no edema, normal capillary refill  NEURO: no focal neurologic deficits, AAOx3, moving all extremities appropriately      Diagnostic " "Data:  Labs:  I personally reviewed the most recent laboratory data pertinent to today's visit  not applicable    I have personally reviewed pertinent lab results.  Lab Results   Component Value Date    WBC 6.40 03/16/2024    HGB 13.9 03/16/2024    HCT 43.4 03/16/2024    MCV 98 03/16/2024     03/16/2024     Lab Results   Component Value Date    CALCIUM 9.4 04/25/2024     10/14/2017     10/14/2017    K 4.5 04/25/2024    CO2 27 04/25/2024     04/25/2024    BUN 16 04/25/2024    CREATININE 0.91 04/25/2024     No results found for: \"IGE\"  Lab Results   Component Value Date    ALT 28 04/25/2024    AST 18 04/25/2024    ALKPHOS 46 04/25/2024    BILITOT 1.4 (H) 10/14/2017    BILITOT 1.4 (H) 10/14/2017       Sleep studies:  Home 4/4/23: JONNA 26.6    New Compliance Data:  5/28/24 - 6/26/24                                   Type of CPAP:  autoPAP 4-20, mean 8.5, max 13.2                                   Percent usage: 100%                                   Average time used: 7 hrs 4 mins                                   Residual AHI: 2.7                                            Ottoniel Butts DO    "

## 2024-07-02 NOTE — PROGRESS NOTES
Progress Note - Sleep Medicine  Prasad Langley 59 y.o. male MRN: [id number]      Assessment/Plan  59 y.o. M with PMHx of anxiety, hyperlipidemia who comes in for management of ELMER.  1.  Moderate ELMER (AHI - 26.6) on AutoPAP 4-20 (DME - [employer], machine - Airsense 11).  With excellent compliance and good clinical response.        -  continue autoPAP pressure 4-20 cc of H2O pressure.        -  Supply order placed.        -  follow compliance annually.        -  Discussed in depth the results of the sleep study and treatment pitfalls prior to initiation.  Answered all questions regarding treatment    ______________________________________________________________________    HPI:    Prasad Langley presents today for follow-up for ELMER.  He continues to do well on his CPAP.  He has not had any trouble with pressure or mask issues.  He denies aerophagia or morning headache.  He feels refreshed when he wakes up in the morning.  He has not had any issues with the machine.        Social history updates:  Social History     Tobacco Use   Smoking Status Former    Current packs/day: 0.00    Average packs/day: 0.5 packs/day for 25.9 years (13.0 ttl pk-yrs)    Types: Cigarettes    Start date:     Quit date: 2015    Years since quittin.5   Smokeless Tobacco Never     Social History     Socioeconomic History    Marital status:      Spouse name: Not on file    Number of children: Not on file    Years of education: Not on file    Highest education level: Not on file   Occupational History    Not on file   Tobacco Use    Smoking status: Former     Current packs/day: 0.00     Average packs/day: 0.5 packs/day for 25.9 years (13.0 ttl pk-yrs)     Types: Cigarettes     Start date:      Quit date: 2015     Years since quittin.5    Smokeless tobacco: Never   Vaping Use    Vaping status: Never Used   Substance and Sexual Activity    Alcohol use: Yes     Alcohol/week: 4.0 standard drinks of alcohol      "Types: 4 Cans of beer per week    Drug use: Never    Sexual activity: Yes   Other Topics Concern    Not on file   Social History Narrative    Not on file     Social Determinants of Health     Financial Resource Strain: Not on file   Food Insecurity: Not on file   Transportation Needs: Not on file   Physical Activity: Not on file   Stress: Not on file   Social Connections: Not on file   Intimate Partner Violence: Not on file   Housing Stability: Not on file       PhysicalExamination:  Vitals:   /80   Pulse 73   Ht 5' 11\" (1.803 m)   Wt 119 kg (261 lb 6.4 oz)   SpO2 97%   BMI 36.46 kg/m²   RA  General: Pleasant, Awake alert and oriented x 3, conversant without conversational dyspnea, NAD, normal affect  HEENT:  PERRL, Sclera noninjected, nonicteric OU, Nares patent,  no craniofacial abnormalities, Mucous membranes, moist, no oral lesions, normal dentition  NECK: Trachea midline, no accessory muscle use, no stridor, no cervical or supraclavicular adenopathy, JVP not elevated  CARDIAC: Reg, single s1/S2, no m/r/g  PULM: CTA bilaterally no wheezing, rhonchi or rales  ABD: Normoactive bowel sounds, soft nontender, nondistended, no rebound, no rigidity, no guarding  EXT: No cyanosis, no clubbing, no edema, normal capillary refill  NEURO: no focal neurologic deficits, AAOx3, moving all extremities appropriately      Diagnostic Data:  Labs:  I personally reviewed the most recent laboratory data pertinent to today's visit  not applicable    I have personally reviewed pertinent lab results.  Lab Results   Component Value Date    WBC 6.40 03/16/2024    HGB 13.9 03/16/2024    HCT 43.4 03/16/2024    MCV 98 03/16/2024     03/16/2024     Lab Results   Component Value Date    CALCIUM 9.4 04/25/2024     10/14/2017     10/14/2017    K 4.5 04/25/2024    CO2 27 04/25/2024     04/25/2024    BUN 16 04/25/2024    CREATININE 0.91 04/25/2024     No results found for: \"IGE\"  Lab Results   Component Value " Date    ALT 28 04/25/2024    AST 18 04/25/2024    ALKPHOS 46 04/25/2024    BILITOT 1.4 (H) 10/14/2017    BILITOT 1.4 (H) 10/14/2017       Sleep studies:  Home 4/4/23: JONNA 26.6    New Compliance Data:  5/28/24 - 6/26/24                                   Type of CPAP:  autoPAP 4-20, mean 8.5, max 13.2                                   Percent usage: 100%                                   Average time used: 7 hrs 4 mins                                   Residual AHI: 2.7                                            Ottoniel Butts DO

## 2024-07-03 ENCOUNTER — TELEPHONE (OUTPATIENT)
Dept: SLEEP CENTER | Facility: CLINIC | Age: 59
End: 2024-07-03

## 2024-07-05 ENCOUNTER — TELEPHONE (OUTPATIENT)
Age: 59
End: 2024-07-05

## 2024-07-05 NOTE — TELEPHONE ENCOUNTER
Pt called referred him to ortho. To get knee drained.  Referral already on file for that.  Cold transfer to below.    [facility]:  [facility]  [address]  [place] [address] [phone]

## 2024-07-11 ENCOUNTER — OFFICE VISIT (OUTPATIENT)
Dept: OBGYN CLINIC | Facility: CLINIC | Age: 59
End: 2024-07-11
Payer: COMMERCIAL

## 2024-07-11 VITALS
WEIGHT: 261 LBS | HEIGHT: 71 IN | BODY MASS INDEX: 36.54 KG/M2 | HEART RATE: 76 BPM | DIASTOLIC BLOOD PRESSURE: 82 MMHG | SYSTOLIC BLOOD PRESSURE: 145 MMHG

## 2024-07-11 DIAGNOSIS — M17.12 PRIMARY OSTEOARTHRITIS OF LEFT KNEE: ICD-10-CM

## 2024-07-11 DIAGNOSIS — G89.29 CHRONIC PAIN OF LEFT KNEE: Primary | ICD-10-CM

## 2024-07-11 DIAGNOSIS — M25.562 CHRONIC PAIN OF LEFT KNEE: Primary | ICD-10-CM

## 2024-07-11 DIAGNOSIS — M25.462 EFFUSION OF LEFT KNEE: ICD-10-CM

## 2024-07-11 PROCEDURE — 99213 OFFICE O/P EST LOW 20 MIN: CPT | Performed by: PHYSICIAN ASSISTANT

## 2024-07-11 PROCEDURE — 20610 DRAIN/INJ JOINT/BURSA W/O US: CPT | Performed by: PHYSICIAN ASSISTANT

## 2024-07-11 RX ORDER — BUPIVACAINE HYDROCHLORIDE 2.5 MG/ML
2 INJECTION, SOLUTION INFILTRATION; PERINEURAL
Status: COMPLETED | OUTPATIENT
Start: 2024-07-11 | End: 2024-07-11

## 2024-07-11 RX ORDER — TRIAMCINOLONE ACETONIDE 40 MG/ML
80 INJECTION, SUSPENSION INTRA-ARTICULAR; INTRAMUSCULAR
Status: COMPLETED | OUTPATIENT
Start: 2024-07-11 | End: 2024-07-11

## 2024-07-11 RX ADMIN — TRIAMCINOLONE ACETONIDE 80 MG: 40 INJECTION, SUSPENSION INTRA-ARTICULAR; INTRAMUSCULAR at 17:30

## 2024-07-11 RX ADMIN — BUPIVACAINE HYDROCHLORIDE 2 ML: 2.5 INJECTION, SOLUTION INFILTRATION; PERINEURAL at 17:30

## 2024-07-11 NOTE — PROGRESS NOTES
Orthopaedic Surgery - Office Note  Prasad Langley (59 y.o. male)   : 1965   MRN: [id number]  Encounter Date: 2024    Chief Complaint   Patient presents with    Left Knee - Follow-up         Assessment/Plan  Diagnoses and all orders for this visit:    Chronic pain of left knee    Primary osteoarthritis of left knee    Effusion of left knee    Other orders  -     Large joint arthrocentesis    The diagnosis as well as treatment options were reviewed with the patient in the office today.  His x-rays were reviewed with the patient.  He was advised he has significant osteoarthritis in the knee.  The only definitive treatment would be a left total knee arthroplasty.  Other conservative options will be treating the symptoms.  He does not wish to consider surgery at this time.  The risks and benefits of a cortisone injection were reviewed.  Shared decision-making was utilized and he elected to proceed with the injection.  He tolerated this well and there were no complications.  He will ice the knee for comfort 20 minutes on 1 hour off 3 times a day.  He may continue the oral naproxen with food stopping and calling if any stomach upset occurs.  He will continue a dedicated home exercise program to maintain his strength and range of motion.  He May continue the hinged knee sleeve for comfort and support that may be be worn when active.  We discussed Visco injections as a next step treatment option if incomplete relief of pain is obtained with cortisone     Return if symptoms worsen or fail to improve.        History of Present Illness  This is a previous patient here for left knee recheck.  He has known osteoarthritis and was seen by myself on 1/15/2024 at which time he had x-rays confirming the osteoarthritis and received a cortisone injection which helped until recently.  He has noticed increased pain and swelling and discussed with his PCP who recommended evaluation for possible aspiration and injection.  He has  "a knee sleeve that he uses for stability.  He has been icing and using oral anti-inflammatories.  He does not wish to consider surgery at this time.  We did review Visco as a treatment option in the future depending on when pain returns after cortisone injections.  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  He would like to have the knee aspirated and a cortisone injection today.  He reports the swelling has gone down but it is still stiff    Review of Systems  Pertinent items are noted in HPI.  All other systems were reviewed and are negative.    Physical Exam  /82 (BP Location: Right arm, Patient Position: Sitting, Cuff Size: Large)   Pulse 76   Ht 5' 11\" (1.803 m)   Wt 118 kg (261 lb)   BMI 36.40 kg/m²   Cons: Appears well.  No apparent distress.  Psych: Alert. Oriented x3.  Mood and affect normal.    Left knee is without skin breakdown lesion or signs of infection.  There is a 1+ intra-articular effusion today.  Patient has slight valgus deformity.  He has full extension and flexes to 110 degrees, but improved to 125 degrees post aspiration there is no calf tenderness there is negative Homans.  He has no pain or instability with valgus and varus stressing.  He is tender to palpation on both the medial and lateral joint line with negative Keyla's.  He has no instability with Lachman and posterior drawer.  He is mildly tender on the medial and lateral border of the patella.  There is mild quad weakness to 4 out of 5.  Hamstring strength is 5 out of 5.  Gait is heel-to-toe            Studies Reviewed  LEFT KNEE     INDICATION:   swelling pain, unknown trauma, suspect osteoarthritis.     COMPARISON:  None     VIEWS:  XR KNEE 4+ VW LEFT INJURY        FINDINGS:     There is no acute fracture or dislocation.     There is no joint effusion.     Tricompartmental osteoarthritis with severe narrowing of the medial " "tibiofemoral joint space and osteophytes seen in all 3 compartments.  There is mild genu varus.     No lytic or blastic osseous lesion.     Soft tissues are unremarkable.     IMPRESSION:     No acute osseous abnormality.     Degenerative changes as described.           Workstation performed: [id number]       Large joint arthrocentesis: L knee  Universal Protocol:  Consent: Verbal consent obtained.  Risks and benefits: risks, benefits and alternatives were discussed  Consent given by: patient  Time out: Immediately prior to procedure a \"time out\" was called to verify the correct patient, procedure, equipment, support staff and site/side marked as required.  Patient understanding: patient states understanding of the procedure being performed  Patient consent: the patient's understanding of the procedure matches consent given  Relevant documents: relevant documents present and verified  Test results: test results available and properly labeled  Site marked: the operative site was marked  Radiology Images displayed and confirmed. If images not available, report reviewed: imaging studies available  Patient identity confirmed: verbally with patient  Supporting Documentation  Indications: joint swelling and pain   Procedure Details  Location: knee - L knee  Preparation: Patient was prepped and draped in the usual sterile fashion  Needle size: 18 G  Ultrasound guidance: no  Approach: lateral  Medications administered: 2 mL bupivacaine 0.25 %; 80 mg triamcinolone acetonide 40 mg/mL    Aspirate: clear and yellow  Patient tolerance: patient tolerated the procedure well with no immediate complications  Dressing:  Sterile dressing applied        Medical, Surgical, Family, and Social History  The patient's medical history, family history, and social history, were reviewed and updated as appropriate.    Past Medical History:   Diagnosis Date    Acute suppurative otitis media of left ear without spontaneous rupture of tympanic " membrane 2018    Anxiety     Depression     HL (hearing loss)     Hyperlipidemia     Hypertension     Obesity (BMI 35.0-39.9 without comorbidity) 2017       Past Surgical History:   Procedure Laterality Date    NM OPEN TREATMENT METATARSAL FRACTURE EACH Right 2023    Procedure: OPEN REDUCTION W/ INTERNAL FIXATION (ORIF) FOOT, ORIF dupree fracture 5th met;  Surgeon: Pramod Nuñez DPM;  Location: [facility];  Service: Podiatry    WISDOM TOOTH EXTRACTION         Family History   Problem Relation Age of Onset    Diabetes Father         borderline    Hypothyroidism Father     Prostate cancer Father     Heart disease Father     Skin cancer Father     Dementia Father     Hypothyroidism Sister     Fibromyalgia Sister     Heart disease Sister     Ovarian cancer Mother     Skin cancer Mother        Social History     Occupational History    Not on file   Tobacco Use    Smoking status: Former     Current packs/day: 0.00     Average packs/day: 0.5 packs/day for 25.9 years (13.0 ttl pk-yrs)     Types: Cigarettes     Start date:      Quit date: 2015     Years since quittin.6    Smokeless tobacco: Never   Vaping Use    Vaping status: Never Used   Substance and Sexual Activity    Alcohol use: Yes     Alcohol/week: 4.0 standard drinks of alcohol     Types: 4 Cans of beer per week    Drug use: Never    Sexual activity: Yes       No Known Allergies      Current Outpatient Medications:     aspirin 81 MG tablet, Take 1 tablet by mouth daily, Disp: , Rfl:     atorvastatin (LIPITOR) 20 mg tablet, TAKE 1 TABLET BY MOUTH DAILY, Disp: 90 tablet, Rfl: 3    buPROPion (WELLBUTRIN XL) 150 mg 24 hr tablet, Take 1 tablet (150 mg total) by mouth daily, Disp: 90 tablet, Rfl: 1    co-enzyme Q-10 30 MG capsule, Take 30 mg by mouth daily, Disp: , Rfl:     Green Tea 150 MG CAPS, Take by mouth daily, Disp: , Rfl:     Multiple Vitamins-Minerals (MULTIVITAMIN ADULT) TABS, Take 1 tablet by mouth daily, Disp: , Rfl:      naproxen (Naprosyn) 500 mg tablet, Take 1 tablet (500 mg total) by mouth 2 (two) times a day with meals (Patient taking differently: Take 500 mg by mouth 2 (two) times a day with meals PRN), Disp: 30 tablet, Rfl: 0    Omega-3 Fatty Acids ([employer] FISH OIL) 1000 MG CAPS, Take 2 capsules by mouth 2 (two) times a day, Disp: , Rfl:     Turmeric Curcumin 500 MG CAPS, Take 1 capsule by mouth daily, Disp: , Rfl:     venlafaxine (EFFEXOR-XR) 150 mg 24 hr capsule, TAKE 1 CAPSULE BY MOUTH DAILY, Disp: 90 capsule, Rfl: 3      Reji Tobar PA-C

## 2024-07-11 NOTE — PATIENT INSTRUCTIONS
Patient Education     Viscosupplementation   Why is this procedure done?   Your joints are where two bones meet. The ends of the bones are covered with a protective coating of cartilage. This helps them glide smoothly during movement. A fluid also helps the joint move more smoothly. With years of use, the cartilage may begin to wear away. This causes pain in the joints. This procedure is done to relieve the pain. A special fluid is used to help the bones glide more easily. It also acts like a shock absorber. This is most often done on the knee joints.  What will the results be?   Lubricates the joint  Less pain in the joints during movement or weight bearing  Improved joint mobility or movement  What happens before the procedure?   Your doctor may ask you to try other ways to treat osteoarthritis or OA, such as exercise, physical therapy, drugs for pain, applying hot compress, and losing weight.  Talk to your doctor about all the drugs you are taking. Be sure to include all prescription and over-the-counter (OTC) drugs, and herbal supplements. Tell the doctor about any drug allergy. Bring a list of drugs you take with you. Some drugs may interact with the injection.  What happens during the procedure?   Your doctor will clean the skin area where the injection will be given. You will be given a drug called local anesthesia. This will numb your skin and you will not feel any pain.  In some cases, your doctor might use imaging like x-ray or ultrasound to make sure they inject the right spot.  If you have excess fluid in your joint, your doctor may remove the fluid before beginning.  A needle will be used to inject the hyaluronic acid into the joint. Hyaluronic acid is a natural fluid in your body. It lubricates the joint to ease body movements.  The doctor will cover the injection site with a small bandage to prevent infection.  The procedure may only take a couple of minutes.  What happens after the procedure?   You  may feel slight pain, warmth, and swelling around the joint area.  You will be able to go home after the injection.  What care is needed at home?   Ask your doctor what you need to do when you go home. Make sure you ask questions if you do not understand what the doctor says. This way you will know what you need to do.  Place an ice pack or a bag of frozen peas wrapped in a towel over the painful part. Never put ice right on the skin. Do not leave the ice on more than 10 to 15 minutes at a time.  Rest for the first few days after the procedure. Avoid strenuous activities like heavy lifting, jogging, standing for a long time, and hard exercise.  What follow-up care is needed?   Your doctor may ask you to make visits to the office to check on your progress. Be sure to keep these visits. Your doctor may want you to have more injections.  What lifestyle changes are needed?   Ask your doctor about when you can go back to your normal activities like exercise or work.  What problems could happen?   Pain, redness, and swelling around the injection site  Infection of the joint  Fever  Allergic reaction  Itching  Rash  Bruising  Bleeding in the joint  No change in pain after injection  Last Reviewed Date   2021-03-30  Consumer Information Use and Disclaimer   This generalized information is a limited summary of diagnosis, treatment, and/or medication information. It is not meant to be comprehensive and should be used as a tool to help the user understand and/or assess potential diagnostic and treatment options. It does NOT include all information about conditions, treatments, medications, side effects, or risks that may apply to a specific patient. It is not intended to be medical advice or a substitute for the medical advice, diagnosis, or treatment of a health care provider based on the health care provider's examination and assessment of a patient’s specific and unique circumstances. Patients must speak with a health care  provider for complete information about their health, medical questions, and treatment options, including any risks or benefits regarding use of medications. This information does not endorse any treatments or medications as safe, effective, or approved for treating a specific patient. [employer] and its affiliates disclaim any warranty or liability relating to this information or the use thereof. The use of this information is governed by the Terms of Use, available at https://www.[employer].com/en/know/clinical-effectiveness-terms   Copyright   Copyright © 2024 [employer] and its affiliates and/or licensors. All rights reserved.

## 2024-08-12 DIAGNOSIS — F32.A DEPRESSION, UNSPECIFIED DEPRESSION TYPE: ICD-10-CM

## 2024-08-13 RX ORDER — BUPROPION HYDROCHLORIDE 150 MG/1
150 TABLET ORAL DAILY
Qty: 100 TABLET | Refills: 1 | Status: SHIPPED | OUTPATIENT
Start: 2024-08-13

## 2024-10-25 ENCOUNTER — APPOINTMENT (OUTPATIENT)
Dept: LAB | Age: 59
End: 2024-10-25
Payer: COMMERCIAL

## 2024-10-25 DIAGNOSIS — Z13.0 SCREENING FOR DEFICIENCY ANEMIA: ICD-10-CM

## 2024-10-25 DIAGNOSIS — R73.03 PREDIABETES: ICD-10-CM

## 2024-10-25 DIAGNOSIS — E78.5 DYSLIPIDEMIA: ICD-10-CM

## 2024-10-25 DIAGNOSIS — Z12.5 SCREENING FOR PROSTATE CANCER: ICD-10-CM

## 2024-10-25 LAB
ALBUMIN SERPL BCG-MCNC: 4.2 G/DL (ref 3.5–5)
ALP SERPL-CCNC: 47 U/L (ref 34–104)
ALT SERPL W P-5'-P-CCNC: 32 U/L (ref 7–52)
ANION GAP SERPL CALCULATED.3IONS-SCNC: 8 MMOL/L (ref 4–13)
AST SERPL W P-5'-P-CCNC: 20 U/L (ref 13–39)
BASOPHILS # BLD AUTO: 0.02 THOUSANDS/ΜL (ref 0–0.1)
BASOPHILS NFR BLD AUTO: 0 % (ref 0–1)
BILIRUB SERPL-MCNC: 1.04 MG/DL (ref 0.2–1)
BUN SERPL-MCNC: 17 MG/DL (ref 5–25)
CALCIUM SERPL-MCNC: 9.5 MG/DL (ref 8.4–10.2)
CHLORIDE SERPL-SCNC: 100 MMOL/L (ref 96–108)
CHOLEST SERPL-MCNC: 157 MG/DL
CO2 SERPL-SCNC: 30 MMOL/L (ref 21–32)
CREAT SERPL-MCNC: 0.83 MG/DL (ref 0.6–1.3)
EOSINOPHIL # BLD AUTO: 0.23 THOUSAND/ΜL (ref 0–0.61)
EOSINOPHIL NFR BLD AUTO: 3 % (ref 0–6)
ERYTHROCYTE [DISTWIDTH] IN BLOOD BY AUTOMATED COUNT: 14 % (ref 11.6–15.1)
EST. AVERAGE GLUCOSE BLD GHB EST-MCNC: 134 MG/DL
GFR SERPL CREATININE-BSD FRML MDRD: 97 ML/MIN/1.73SQ M
GLUCOSE P FAST SERPL-MCNC: 109 MG/DL (ref 65–99)
HBA1C MFR BLD: 6.3 %
HCT VFR BLD AUTO: 44.6 % (ref 36.5–49.3)
HDLC SERPL-MCNC: 47 MG/DL
HGB BLD-MCNC: 14.3 G/DL (ref 12–17)
IMM GRANULOCYTES # BLD AUTO: 0.03 THOUSAND/UL (ref 0–0.2)
IMM GRANULOCYTES NFR BLD AUTO: 0 % (ref 0–2)
LDLC SERPL CALC-MCNC: 82 MG/DL (ref 0–100)
LYMPHOCYTES # BLD AUTO: 1.91 THOUSANDS/ΜL (ref 0.6–4.47)
LYMPHOCYTES NFR BLD AUTO: 23 % (ref 14–44)
MCH RBC QN AUTO: 30.4 PG (ref 26.8–34.3)
MCHC RBC AUTO-ENTMCNC: 32.1 G/DL (ref 31.4–37.4)
MCV RBC AUTO: 95 FL (ref 82–98)
MONOCYTES # BLD AUTO: 0.61 THOUSAND/ΜL (ref 0.17–1.22)
MONOCYTES NFR BLD AUTO: 7 % (ref 4–12)
NEUTROPHILS # BLD AUTO: 5.41 THOUSANDS/ΜL (ref 1.85–7.62)
NEUTS SEG NFR BLD AUTO: 67 % (ref 43–75)
NRBC BLD AUTO-RTO: 0 /100 WBCS
PLATELET # BLD AUTO: 249 THOUSANDS/UL (ref 149–390)
PMV BLD AUTO: 11 FL (ref 8.9–12.7)
POTASSIUM SERPL-SCNC: 4.8 MMOL/L (ref 3.5–5.3)
PROT SERPL-MCNC: 6.8 G/DL (ref 6.4–8.4)
PSA SERPL-MCNC: 0.41 NG/ML (ref 0–4)
RBC # BLD AUTO: 4.7 MILLION/UL (ref 3.88–5.62)
SODIUM SERPL-SCNC: 138 MMOL/L (ref 135–147)
TRIGL SERPL-MCNC: 140 MG/DL
TSH SERPL DL<=0.05 MIU/L-ACNC: 2.1 UIU/ML (ref 0.45–4.5)
WBC # BLD AUTO: 8.21 THOUSAND/UL (ref 4.31–10.16)

## 2024-10-25 PROCEDURE — 80061 LIPID PANEL: CPT

## 2024-10-25 PROCEDURE — 84443 ASSAY THYROID STIM HORMONE: CPT

## 2024-10-25 PROCEDURE — 36415 COLL VENOUS BLD VENIPUNCTURE: CPT

## 2024-10-25 PROCEDURE — 80053 COMPREHEN METABOLIC PANEL: CPT

## 2024-10-25 PROCEDURE — G0103 PSA SCREENING: HCPCS

## 2024-10-25 PROCEDURE — 85025 COMPLETE CBC W/AUTO DIFF WBC: CPT

## 2024-11-07 ENCOUNTER — OFFICE VISIT (OUTPATIENT)
Dept: INTERNAL MEDICINE CLINIC | Facility: OTHER | Age: 59
End: 2024-11-07
Payer: COMMERCIAL

## 2024-11-07 VITALS
TEMPERATURE: 98.6 F | OXYGEN SATURATION: 98 % | WEIGHT: 256 LBS | HEART RATE: 101 BPM | SYSTOLIC BLOOD PRESSURE: 146 MMHG | DIASTOLIC BLOOD PRESSURE: 94 MMHG | HEIGHT: 71 IN | BODY MASS INDEX: 35.84 KG/M2

## 2024-11-07 DIAGNOSIS — Z23 ENCOUNTER FOR IMMUNIZATION: ICD-10-CM

## 2024-11-07 DIAGNOSIS — E78.5 DYSLIPIDEMIA: ICD-10-CM

## 2024-11-07 DIAGNOSIS — R73.03 PREDIABETES: ICD-10-CM

## 2024-11-07 DIAGNOSIS — Z00.00 ANNUAL PHYSICAL EXAM: Primary | ICD-10-CM

## 2024-11-07 DIAGNOSIS — R39.11 URINARY HESITANCY: ICD-10-CM

## 2024-11-07 DIAGNOSIS — M17.12 PRIMARY OSTEOARTHRITIS OF LEFT KNEE: ICD-10-CM

## 2024-11-07 DIAGNOSIS — F33.0 MILD EPISODE OF RECURRENT MAJOR DEPRESSIVE DISORDER (HCC): ICD-10-CM

## 2024-11-07 DIAGNOSIS — I10 PRIMARY HYPERTENSION: ICD-10-CM

## 2024-11-07 PROCEDURE — 99214 OFFICE O/P EST MOD 30 MIN: CPT | Performed by: NURSE PRACTITIONER

## 2024-11-07 PROCEDURE — 90673 RIV3 VACCINE NO PRESERV IM: CPT

## 2024-11-07 PROCEDURE — 99396 PREV VISIT EST AGE 40-64: CPT | Performed by: NURSE PRACTITIONER

## 2024-11-07 PROCEDURE — 90471 IMMUNIZATION ADMIN: CPT

## 2024-11-07 RX ORDER — TAMSULOSIN HYDROCHLORIDE 0.4 MG/1
0.4 CAPSULE ORAL
Qty: 30 CAPSULE | Refills: 0 | Status: SHIPPED | OUTPATIENT
Start: 2024-11-07

## 2024-11-07 RX ORDER — LISINOPRIL 10 MG/1
10 TABLET ORAL DAILY
Qty: 30 TABLET | Refills: 1 | Status: SHIPPED | OUTPATIENT
Start: 2024-11-07

## 2024-11-07 NOTE — ASSESSMENT & PLAN NOTE
- start lisinopril 10mg daily  - encourage healthy diet and routine exercise, weight loss  - monitor BP at home and record in log, bring log and cuff with to follow up in 4 weeks    Orders:    lisinopril (ZESTRIL) 10 mg tablet; Take 1 tablet (10 mg total) by mouth daily    Basic metabolic panel; Future

## 2024-11-07 NOTE — ASSESSMENT & PLAN NOTE
PSA normal  Start flomax 0.4mg daily  Follow up in 4 weeks     Orders:    tamsulosin (FLOMAX) 0.4 mg; Take 1 capsule (0.4 mg total) by mouth daily with dinner

## 2024-11-07 NOTE — PROGRESS NOTES
Adult Annual Physical  Name: Prasad Langley      : 1965      MRN: [id number]  Encounter Provider: BRYCE Waldron  Encounter Date: 2024   Encounter department: [facility] CARE [place]    Assessment & Plan  Annual physical exam  59 yr old male  Labs and preventative screenings up to date   Influenza vaccine given in office today        Mild episode of recurrent major depressive disorder (HCC)  Denies SI or HI  Offered medication adjustments but pt would like to continue current regimen until he see's psych  Previously on [facility] psychiatry waitlist but has not been seen  Referral made to different psychiatrist, pt will contact office if any issues scheduling  Continue wellbutrin XL 150mg daily and Effexor XR 150mg daily       Orders:    Ambulatory referral to Psych Services; Future    Urinary hesitancy  PSA normal  Start flomax 0.4mg daily  Follow up in 4 weeks     Orders:    tamsulosin (FLOMAX) 0.4 mg; Take 1 capsule (0.4 mg total) by mouth daily with dinner    Primary hypertension  - start lisinopril 10mg daily  - encourage healthy diet and routine exercise, weight loss  - monitor BP at home and record in log, bring log and cuff with to follow up in 4 weeks    Orders:    lisinopril (ZESTRIL) 10 mg tablet; Take 1 tablet (10 mg total) by mouth daily    Basic metabolic panel; Future    Encounter for immunization    Orders:    influenza vaccine, recombinant, PF, 0.5 mL IM (Flublok)    Primary osteoarthritis of left knee  Following with ortho         Dyslipidemia  Lipids at goal  Continue atorvastatin 20mg daily and fish oil 2000mg bid     Orders:    Comprehensive metabolic panel; Future    Lipid Panel with Direct LDL reflex; Future    Prediabetes  Hba1c 6.3, worsening  Encourage healthy diet low in sugar and carbs   Routine exercise  Repeat in 6 months     Orders:    Hemoglobin A1C; Future    Comprehensive metabolic panel; Future    Immunizations and preventive care  screenings were discussed with patient today. Appropriate education was printed on patient's after visit summary.       History of Present Illness     Adult Annual Physical:  Patient presents for annual physical. He states he has been having anxiety, depression and anger.   He is on wellbutrin XL 150mg daily and Effexor XR 150mg daily. He feels very overwhelmed. He states he has good family support. .     Diet and Physical Activity:  - Diet/Nutrition: well balanced diet.  - Exercise: walking.    Depression Screening:    - PHQ-9 Score: 3    General Health:  - Sleep: sleeps well.  - Hearing: decreased hearing bilateral ears. was recommended hearing aids  - Vision: goes for regular eye exams and wears glasses.  - Dental: no dental visits for > 1 year.     Health:    - Urinary symptoms: urinary hesitancy.     Review of Systems   Constitutional:  Negative for activity change, appetite change, chills, diaphoresis, fatigue, fever and unexpected weight change.   Eyes:  Negative for visual disturbance.   Respiratory:  Negative for cough, chest tightness, shortness of breath and wheezing.    Cardiovascular:  Negative for chest pain, palpitations and leg swelling.   Gastrointestinal:  Negative for abdominal distention, abdominal pain, blood in stool, constipation, diarrhea, nausea and vomiting.   Genitourinary:  Negative for frequency.   Musculoskeletal:  Positive for arthralgias (bilateral knee pain, right elbow). Negative for joint swelling.   Skin:  Negative for rash.   Neurological:  Negative for dizziness, light-headedness and headaches.   Psychiatric/Behavioral:  Positive for dysphoric mood. Negative for self-injury and suicidal ideas. The patient is nervous/anxious.      Medical History Reviewed by provider this encounter:       Past Medical History   Past Medical History:   Diagnosis Date    Acute suppurative otitis media of left ear without spontaneous rupture of tympanic membrane 01/22/2018    Anxiety      Depression     HL (hearing loss)     Hyperlipidemia     Hypertension     Obesity (BMI 35.0-39.9 without comorbidity) 01/16/2017     Past Surgical History:   Procedure Laterality Date    [place] OPEN TREATMENT METATARSAL FRACTURE EACH Right 1/20/2023    Procedure: OPEN REDUCTION W/ INTERNAL FIXATION (ORIF) FOOT, ORIF dupree fracture 5th met;  Surgeon: Pramod Nuñez DPM;  Location: [facility];  Service: Podiatry    WISDOM TOOTH EXTRACTION       Family History   Problem Relation Age of Onset    Diabetes Father         borderline    Hypothyroidism Father     Prostate cancer Father     Heart disease Father     Skin cancer Father     Dementia Father     Hypothyroidism Sister     Fibromyalgia Sister     Heart disease Sister     Ovarian cancer Mother     Skin cancer Mother      Current Outpatient Medications on File Prior to Visit   Medication Sig Dispense Refill    aspirin 81 MG tablet Take 1 tablet by mouth daily      atorvastatin (LIPITOR) 20 mg tablet TAKE 1 TABLET BY MOUTH DAILY 90 tablet 3    buPROPion (WELLBUTRIN XL) 150 mg 24 hr tablet Take 1 tablet (150 mg total) by mouth daily 100 tablet 1    co-enzyme Q-10 30 MG capsule Take 30 mg by mouth daily      Green Tea 150 MG CAPS Take by mouth daily      Multiple Vitamins-Minerals (MULTIVITAMIN ADULT) TABS Take 1 tablet by mouth daily      naproxen (Naprosyn) 500 mg tablet Take 1 tablet (500 mg total) by mouth 2 (two) times a day with meals (Patient taking differently: Take 500 mg by mouth 2 (two) times a day with meals PRN) 30 tablet 0    Omega-3 Fatty Acids ([employer] FISH OIL) 1000 MG CAPS Take 2 capsules by mouth 2 (two) times a day      Turmeric Curcumin 500 MG CAPS Take 1 capsule by mouth daily      venlafaxine (EFFEXOR-XR) 150 mg 24 hr capsule TAKE 1 CAPSULE BY MOUTH DAILY 90 capsule 3     No current facility-administered medications on file prior to visit.   No Known Allergies   Current Outpatient Medications on File Prior to Visit   Medication Sig Dispense  "Refill    aspirin 81 MG tablet Take 1 tablet by mouth daily      atorvastatin (LIPITOR) 20 mg tablet TAKE 1 TABLET BY MOUTH DAILY 90 tablet 3    buPROPion (WELLBUTRIN XL) 150 mg 24 hr tablet Take 1 tablet (150 mg total) by mouth daily 100 tablet 1    co-enzyme Q-10 30 MG capsule Take 30 mg by mouth daily      Green Tea 150 MG CAPS Take by mouth daily      Multiple Vitamins-Minerals (MULTIVITAMIN ADULT) TABS Take 1 tablet by mouth daily      naproxen (Naprosyn) 500 mg tablet Take 1 tablet (500 mg total) by mouth 2 (two) times a day with meals (Patient taking differently: Take 500 mg by mouth 2 (two) times a day with meals PRN) 30 tablet 0    Omega-3 Fatty Acids ([employer] FISH OIL) 1000 MG CAPS Take 2 capsules by mouth 2 (two) times a day      Turmeric Curcumin 500 MG CAPS Take 1 capsule by mouth daily      venlafaxine (EFFEXOR-XR) 150 mg 24 hr capsule TAKE 1 CAPSULE BY MOUTH DAILY 90 capsule 3     No current facility-administered medications on file prior to visit.      Social History     Tobacco Use    Smoking status: Former     Current packs/day: 0.00     Average packs/day: 0.5 packs/day for 25.9 years (13.0 ttl pk-yrs)     Types: Cigarettes     Start date:      Quit date: 2015     Years since quittin.9    Smokeless tobacco: Never   Vaping Use    Vaping status: Never Used   Substance and Sexual Activity    Alcohol use: Yes     Alcohol/week: 4.0 standard drinks of alcohol     Types: 4 Cans of beer per week    Drug use: No    Sexual activity: Yes       Objective     /94 (BP Location: Left arm, Patient Position: Sitting, Cuff Size: Adult)   Pulse 101   Temp 98.6 °F (37 °C) (Temporal)   Ht 5' 11\" (1.803 m)   Wt 116 kg (256 lb)   SpO2 98%   BMI 35.70 kg/m²     Physical Exam  Vitals reviewed.   Constitutional:       General: He is not in acute distress.     Appearance: Normal appearance. He is well-developed. He is obese. He is not diaphoretic.   HENT:      Head: Normocephalic and atraumatic.      " Right Ear: Tympanic membrane and external ear normal.      Left Ear: Tympanic membrane and external ear normal.      Nose: Nose normal. No mucosal edema, congestion or rhinorrhea.      Mouth/Throat:      Mouth: Mucous membranes are moist.      Pharynx: Oropharynx is clear. No oropharyngeal exudate or posterior oropharyngeal erythema.   Eyes:      Conjunctiva/sclera: Conjunctivae normal.      Pupils: Pupils are equal, round, and reactive to light.   Neck:      Thyroid: No thyromegaly.      Vascular: No carotid bruit.   Cardiovascular:      Rate and Rhythm: Normal rate and regular rhythm.      Heart sounds: Normal heart sounds. No murmur heard.  Pulmonary:      Effort: Pulmonary effort is normal. No respiratory distress.      Breath sounds: Normal breath sounds. No decreased breath sounds, wheezing or rhonchi.   Abdominal:      General: Bowel sounds are normal. There is no distension.      Palpations: Abdomen is soft. There is no mass.      Tenderness: There is no abdominal tenderness. There is no guarding.   Musculoskeletal:      Cervical back: Normal range of motion and neck supple. No edema.      Right lower leg: No edema.      Left lower leg: No edema.   Lymphadenopathy:      Cervical: No cervical adenopathy.      Upper Body:      Right upper body: No supraclavicular, axillary, pectoral or epitrochlear adenopathy.      Left upper body: No supraclavicular, axillary, pectoral or epitrochlear adenopathy.   Skin:     General: Skin is warm.      Findings: No rash.   Neurological:      Mental Status: He is alert and oriented to person, place, and time. Mental status is at baseline.      Motor: No weakness or abnormal muscle tone.      Gait: Gait normal.      Deep Tendon Reflexes: Reflexes are normal and symmetric.   Psychiatric:         Mood and Affect: Mood normal.         Behavior: Behavior normal.         Thought Content: Thought content normal.         Judgment: Judgment normal.

## 2024-11-07 NOTE — ASSESSMENT & PLAN NOTE
Lipids at goal  Continue atorvastatin 20mg daily and fish oil 2000mg bid     Orders:    Comprehensive metabolic panel; Future    Lipid Panel with Direct LDL reflex; Future

## 2024-11-07 NOTE — PATIENT INSTRUCTIONS
"Follow up with psychitry   Start flomax once daily at dinner  Start lisinopril 10mg daily in the morning and monitor blood pressure at home. Bring log and home cuff with to follow up in 1 month  Go for labs a few days before follow up     Patient Education     Routine physical for adults   The Basics   Written by the doctors and editors at [facility]   What is a physical? -- A physical is a routine visit, or \"check-up,\" with your doctor. You might also hear it called a \"wellness visit\" or \"preventive visit.\"  During each visit, the doctor will:   Ask about your physical and mental health   Ask about your habits, behaviors, and lifestyle   Do an exam   Give you vaccines if needed   Talk to you about any medicines you take   Give advice about your health   Answer your questions  Getting regular check-ups is an important part of taking care of your health. It can help your doctor find and treat any problems you have. But it's also important for preventing health problems.  A routine physical is different from a \"sick visit.\" A sick visit is when you see a doctor because of a health concern or problem. Since physicals are scheduled ahead of time, you can think about what you want to ask the doctor.  How often should I get a physical? -- It depends on your age and health. In general, for people age 21 years and older:   If you are younger than 50 years, you might be able to get a physical every 3 years.   If you are 50 years or older, your doctor might recommend a physical every year.  If you have an ongoing health condition, like diabetes or high blood pressure, your doctor will probably want to see you more often.  What happens during a physical? -- In general, each visit will include:   Physical exam - The doctor or nurse will check your height, weight, heart rate, and blood pressure. They will also look at your eyes and ears. They will ask about how you are feeling and whether you have any symptoms that bother you.   " "Medicines - It's a good idea to bring a list of all the medicines you take to each doctor visit. Your doctor will talk to you about your medicines and answer any questions. Tell them if you are having any side effects that bother you. You should also tell them if you are having trouble paying for any of your medicines.   Habits and behaviors - This includes:   Your diet   Your exercise habits   Whether you smoke, drink alcohol, or use drugs   Whether you are sexually active   Whether you feel safe at home  Your doctor will talk to you about things you can do to improve your health and lower your risk of health problems. They will also offer help and support. For example, if you want to quit smoking, they can give you advice and might prescribe medicines. If you want to improve your diet or get more physical activity, they can help you with this, too.   Lab tests, if needed - The tests you get will depend on your age and situation. For example, your doctor might want to check your:   Cholesterol   Blood sugar   Iron level   Vaccines - The recommended vaccines will depend on your age, health, and what vaccines you already had. Vaccines are very important because they can prevent certain serious or deadly infections.   Discussion of screening - \"Screening\" means checking for diseases or other health problems before they cause symptoms. Your doctor can recommend screening based on your age, risk, and preferences. This might include tests to check for:   Cancer, such as breast, prostate, cervical, ovarian, colorectal, prostate, lung, or skin cancer   Sexually transmitted infections, such as chlamydia and gonorrhea   Mental health conditions like depression and anxiety  Your doctor will talk to you about the different types of screening tests. They can help you decide which screenings to have. They can also explain what the results might mean.   Answering questions - The physical is a good time to ask the doctor or nurse " questions about your health. If needed, they can refer you to other doctors or specialists, too.  Adults older than 65 years often need other care, too. As you get older, your doctor will talk to you about:   How to prevent falling at home   Hearing or vision tests   Memory testing   How to take your medicines safely   Making sure that you have the help and support you need at home  All topics are updated as new evidence becomes available and our peer review process is complete.  This topic retrieved from [employer] on: May 02, 2024.  Topic 874595 Version 1.0  Release: 32.4.3 - C32.122  © 2024 UpToDate, Inc. and/or its affiliates. All rights reserved.  Consumer Information Use and Disclaimer   Disclaimer: This generalized information is a limited summary of diagnosis, treatment, and/or medication information. It is not meant to be comprehensive and should be used as a tool to help the user understand and/or assess potential diagnostic and treatment options. It does NOT include all information about conditions, treatments, medications, side effects, or risks that may apply to a specific patient. It is not intended to be medical advice or a substitute for the medical advice, diagnosis, or treatment of a health care provider based on the health care provider's examination and assessment of a patient's specific and unique circumstances. Patients must speak with a health care provider for complete information about their health, medical questions, and treatment options, including any risks or benefits regarding use of medications. This information does not endorse any treatments or medications as safe, effective, or approved for treating a specific patient. UpToDate, Inc. and its affiliates disclaim any warranty or liability relating to this information or the use thereof.The use of this information is governed by the Terms of Use, available at https://www.woltersTalkouwer.com/en/know/clinical-effectiveness-terms. 2024©  [employer] and its affiliates and/or licensors. All rights reserved.  Copyright   © 2024 [employer] and/or its affiliates. All rights reserved.

## 2024-11-07 NOTE — ASSESSMENT & PLAN NOTE
Hba1c 6.3, worsening  Encourage healthy diet low in sugar and carbs   Routine exercise  Repeat in 6 months     Orders:    Hemoglobin A1C; Future    Comprehensive metabolic panel; Future

## 2024-11-07 NOTE — ASSESSMENT & PLAN NOTE
Denies SI or HI  Offered medication adjustments but pt would like to continue current regimen until he see's psych  Previously on [facility] psychiatry waitlist but has not been seen  Referral made to different psychiatrist, pt will contact office if any issues scheduling  Continue wellbutrin XL 150mg daily and Effexor XR 150mg daily       Orders:    Ambulatory referral to Psych Services; Future

## 2024-11-25 DIAGNOSIS — F32.A DEPRESSION, UNSPECIFIED DEPRESSION TYPE: ICD-10-CM

## 2024-11-26 ENCOUNTER — TELEPHONE (OUTPATIENT)
Age: 59
End: 2024-11-26

## 2024-11-26 NOTE — TELEPHONE ENCOUNTER
"Behavioral Health Outpatient Intake Questions    Referred By   : Self Referral     Please advise interviewee that they need to answer all questions truthfully to allow for best care, and any misrepresentations of information may affect their ability to be seen at this clinic   => Was this discussed? Yes     If Minor Child (under age 18)    Who is/are the legal guardian(s) of the child?     Is there a custody agreement?      If \"YES\"- Custody orders must be obtained prior to scheduling the first appointment  In addition, Consent to Treatment must be signed by all legal guardians prior to scheduling the first appointment    If \"NO\"- Consent to Treatment must be signed by all legal guardians prior to scheduling the first appointment    Behavioral Health Outpatient Intake History -     Presenting Problem (in patient's own words): Depression     Are there any communication barriers for this patient?     No                                               If yes, please describe barriers:   If there is a unique situation, please refer to Hugo Callahan/Emma Arreaga for final determination.    Are you taking any psychiatric medications? Yes     If \"YES\" -What are they Effexor XR, Wellbutrin     If \"YES\" -Who prescribes? PCP    Has the Patient previously received outpatient Talk Therapy or Medication Management from [facility]       If \"YES\"- When, Where and with Whom?         If \"NO\" -Has Patient received these services elsewhere?       If \"YES\" -When, Where, and with Whom?    Has the Patient abused alcohol or other substances in the last 6 months ? No       If \"YES\" -What substance, How much, How often?     If illegal substance: Refer to [facility] (for MANI) or [facility].   If Alcohol in excess of 10 drinks per week:  Refer to [facility] (for MAN[facility]) or [facility] Offices    Legal History-     Is this treatment court ordered? No   If \"yes \"send to :  Talk Therapy : Send to Hugo Callahan/Emma Arreaga for final " "determination   Med Management: Send to Dr Vinson for final determination     Has the Patient been convicted of a felony?  NO   If \"Yes\" send to -When, What?  Talk Therapy: Send to Hugo Callahan/Emma rAreaga for final determination   Med Management: Send to Dr Vinson for final determination     ACCEPTED as a patient Yes  If \"Yes\" Appointment Date: 2/11/25 at 5pm.    Referred Elsewhere? No  If “Yes” - (Where? Ex: [facility], [facility], [facility], [facility], etc.)       Name of Insurance Co:[employer]  Insurance ID# [id number]   Insurance Phone # [phone]  If ins is primary or secondary?primary   If patient is a minor, parents information such as Name, D.O.B of guarantor.  NP forms sent via [employer]  "

## 2024-11-27 RX ORDER — BUPROPION HYDROCHLORIDE 150 MG/1
150 TABLET ORAL DAILY
Qty: 100 TABLET | Refills: 0 | OUTPATIENT
Start: 2024-11-27

## 2024-12-03 ENCOUNTER — TELEPHONE (OUTPATIENT)
Dept: PSYCHIATRY | Facility: CLINIC | Age: 59
End: 2024-12-03

## 2024-12-03 NOTE — TELEPHONE ENCOUNTER
One week follow up call for New Patient appointment with Martha Magaña on 2/11/25  was made on 12/3/24. Writer informed patient of New Patient paperwork needing to be completed 5 days prior to the appointment. Writer confirmed paperwork has been sent via [employer].    Appointment was made on: 11/26/24

## 2024-12-04 DIAGNOSIS — R39.11 URINARY HESITANCY: ICD-10-CM

## 2024-12-05 RX ORDER — TAMSULOSIN HYDROCHLORIDE 0.4 MG/1
0.4 CAPSULE ORAL
Qty: 30 CAPSULE | Refills: 5 | Status: SHIPPED | OUTPATIENT
Start: 2024-12-05 | End: 2024-12-11 | Stop reason: SDUPTHER

## 2024-12-11 ENCOUNTER — OFFICE VISIT (OUTPATIENT)
Dept: INTERNAL MEDICINE CLINIC | Facility: OTHER | Age: 59
End: 2024-12-11
Payer: COMMERCIAL

## 2024-12-11 VITALS
TEMPERATURE: 98.2 F | WEIGHT: 256 LBS | SYSTOLIC BLOOD PRESSURE: 116 MMHG | DIASTOLIC BLOOD PRESSURE: 72 MMHG | HEART RATE: 80 BPM | BODY MASS INDEX: 35.84 KG/M2 | OXYGEN SATURATION: 95 % | HEIGHT: 71 IN

## 2024-12-11 DIAGNOSIS — I10 PRIMARY HYPERTENSION: Primary | ICD-10-CM

## 2024-12-11 DIAGNOSIS — R39.11 URINARY HESITANCY: ICD-10-CM

## 2024-12-11 DIAGNOSIS — F33.0 MILD EPISODE OF RECURRENT MAJOR DEPRESSIVE DISORDER (HCC): ICD-10-CM

## 2024-12-11 PROCEDURE — 99213 OFFICE O/P EST LOW 20 MIN: CPT | Performed by: NURSE PRACTITIONER

## 2024-12-11 RX ORDER — LISINOPRIL 10 MG/1
10 TABLET ORAL DAILY
Qty: 90 TABLET | Refills: 1 | Status: SHIPPED | OUTPATIENT
Start: 2024-12-11

## 2024-12-11 RX ORDER — TAMSULOSIN HYDROCHLORIDE 0.4 MG/1
0.4 CAPSULE ORAL
Qty: 90 CAPSULE | Refills: 1 | Status: SHIPPED | OUTPATIENT
Start: 2024-12-11

## 2024-12-11 NOTE — PROGRESS NOTES
Assessment/Plan:    Problem List Items Addressed This Visit       Depression    Continue wellbutrin XL 150mg daily and Effexor XR 150mg daily   Scheduled with psychiatry in February, does not wish to make any medication changes prior to that             Primary hypertension - Primary    Controlled on lisinopril 10 mg daily         Relevant Medications    lisinopril (ZESTRIL) 10 mg tablet    Urinary hesitancy    Improved with Flomax  Continue current regimen         Relevant Medications    tamsulosin (FLOMAX) 0.4 mg       BMI Counseling: Body mass index is 35.7 kg/m².          [employer] software was used to dictate this note.  It may contain errors with dictating incorrect words or incorrect spelling. Please contact the provider directly with any questions.    Subjective:      Patient ID: Prasad Langley is a 59 y.o. male.    HPI    Patient presents today for 1 month follow up    HTN - he is on lisinopril 10mg daily. In Nov his home readings were 140-150/70s. Last week BP was 130s/low 70s. He is compliant with his medication.     He notices improvement with urinary hesitencey.he is now on flomax daily     He is scheduled for psychiatry in February. He does not wish to make any changes today to his antidepressants      The following portions of the patient's history were reviewed and updated as appropriate: allergies, current medications, past family history, past medical history, past social history, past surgical history, and problem list.    Review of Systems   Constitutional:  Negative for activity change, appetite change, chills and fever.   Psychiatric/Behavioral:  Positive for dysphoric mood. Negative for self-injury and suicidal ideas.          Past Medical History:   Diagnosis Date    Acute suppurative otitis media of left ear without spontaneous rupture of tympanic membrane 01/22/2018    Anxiety     Depression     HL (hearing loss)     Hyperlipidemia     Hypertension     Obesity (BMI 35.0-39.9 without  comorbidity) 01/16/2017         Current Outpatient Medications:     aspirin 81 MG tablet, Take 1 tablet by mouth daily, Disp: , Rfl:     atorvastatin (LIPITOR) 20 mg tablet, TAKE 1 TABLET BY MOUTH DAILY, Disp: 90 tablet, Rfl: 3    buPROPion (WELLBUTRIN XL) 150 mg 24 hr tablet, Take 1 tablet (150 mg total) by mouth daily, Disp: 100 tablet, Rfl: 1    co-enzyme Q-10 30 MG capsule, Take 30 mg by mouth daily, Disp: , Rfl:     Green Tea 150 MG CAPS, Take by mouth daily, Disp: , Rfl:     lisinopril (ZESTRIL) 10 mg tablet, Take 1 tablet (10 mg total) by mouth daily, Disp: 90 tablet, Rfl: 1    Multiple Vitamins-Minerals (MULTIVITAMIN ADULT) TABS, Take 1 tablet by mouth daily, Disp: , Rfl:     naproxen (Naprosyn) 500 mg tablet, Take 1 tablet (500 mg total) by mouth 2 (two) times a day with meals (Patient taking differently: Take 500 mg by mouth 2 (two) times a day as needed), Disp: 30 tablet, Rfl: 0    Omega-3 Fatty Acids ([employer] FISH OIL) 1000 MG CAPS, Take 2 capsules by mouth 2 (two) times a day, Disp: , Rfl:     tamsulosin (FLOMAX) 0.4 mg, Take 1 capsule (0.4 mg total) by mouth daily with dinner, Disp: 90 capsule, Rfl: 1    Turmeric Curcumin 500 MG CAPS, Take 1 capsule by mouth daily, Disp: , Rfl:     venlafaxine (EFFEXOR-XR) 150 mg 24 hr capsule, TAKE 1 CAPSULE BY MOUTH DAILY, Disp: 90 capsule, Rfl: 3    No Known Allergies    Social History   Past Surgical History:   Procedure Laterality Date    MO OPEN TREATMENT METATARSAL FRACTURE EACH Right 1/20/2023    Procedure: OPEN REDUCTION W/ INTERNAL FIXATION (ORIF) FOOT, ORIF dupree fracture 5th met;  Surgeon: Pramod Nuñez DPM;  Location: [facility];  Service: Podiatry    WISDOM TOOTH EXTRACTION       Family History   Problem Relation Age of Onset    Diabetes Father         borderline    Hypothyroidism Father     Prostate cancer Father     Heart disease Father     Skin cancer Father     Dementia Father     Hypothyroidism Sister     Fibromyalgia Sister     Heart  "disease Sister     Ovarian cancer Mother     Skin cancer Mother        Objective:  /72 (BP Location: Left arm, Patient Position: Sitting, Cuff Size: Adult)   Pulse 80   Temp 98.2 °F (36.8 °C) (Temporal)   Ht 5' 11\" (1.803 m)   Wt 116 kg (256 lb)   SpO2 95%   BMI 35.70 kg/m²      Physical Exam  Vitals reviewed.   Constitutional:       General: He is not in acute distress.     Appearance: Normal appearance. He is obese. He is not diaphoretic.   HENT:      Head: Normocephalic and atraumatic.   Eyes:      Extraocular Movements: Extraocular movements intact.      Conjunctiva/sclera: Conjunctivae normal.      Pupils: Pupils are equal, round, and reactive to light.   Cardiovascular:      Rate and Rhythm: Normal rate and regular rhythm.      Heart sounds: Normal heart sounds. No murmur heard.  Pulmonary:      Effort: Pulmonary effort is normal. No respiratory distress.      Breath sounds: Normal breath sounds. No wheezing, rhonchi or rales.   Neurological:      Mental Status: He is alert and oriented to person, place, and time. Mental status is at baseline.   Psychiatric:         Mood and Affect: Mood normal.         Behavior: Behavior normal.         Thought Content: Thought content normal.         Judgment: Judgment normal.           "

## 2024-12-16 ENCOUNTER — TELEPHONE (OUTPATIENT)
Dept: PSYCHIATRY | Facility: CLINIC | Age: 59
End: 2024-12-16

## 2024-12-16 NOTE — TELEPHONE ENCOUNTER
Pt is scheduled with Martha Magaña on 02/11/2025. LVM and offered an earlier appt but virtual appt with Dr. Edward. Waiting for call back   MED/SURG

## 2025-02-16 DIAGNOSIS — F32.A DEPRESSION, UNSPECIFIED DEPRESSION TYPE: ICD-10-CM

## 2025-02-16 RX ORDER — BUPROPION HYDROCHLORIDE 150 MG/1
150 TABLET ORAL DAILY
Qty: 90 TABLET | Refills: 3 | Status: SHIPPED | OUTPATIENT
Start: 2025-02-16

## 2025-03-24 ENCOUNTER — TELEPHONE (OUTPATIENT)
Dept: PSYCHIATRY | Facility: CLINIC | Age: 60
End: 2025-03-24

## 2025-04-03 DIAGNOSIS — E78.5 DYSLIPIDEMIA: ICD-10-CM

## 2025-04-04 RX ORDER — ATORVASTATIN CALCIUM 20 MG/1
20 TABLET, FILM COATED ORAL DAILY
Qty: 90 TABLET | Refills: 1 | Status: SHIPPED | OUTPATIENT
Start: 2025-04-04

## 2025-04-12 DIAGNOSIS — I10 PRIMARY HYPERTENSION: ICD-10-CM

## 2025-04-13 RX ORDER — LISINOPRIL 10 MG/1
10 TABLET ORAL DAILY
Qty: 90 TABLET | Refills: 1 | Status: SHIPPED | OUTPATIENT
Start: 2025-04-13

## 2025-04-14 DIAGNOSIS — F32.A DEPRESSION, UNSPECIFIED DEPRESSION TYPE: ICD-10-CM

## 2025-04-16 RX ORDER — VENLAFAXINE HYDROCHLORIDE 150 MG/1
150 CAPSULE, EXTENDED RELEASE ORAL DAILY
Qty: 90 CAPSULE | Refills: 1 | Status: SHIPPED | OUTPATIENT
Start: 2025-04-16

## 2025-04-26 ENCOUNTER — APPOINTMENT (OUTPATIENT)
Dept: LAB | Facility: IMAGING CENTER | Age: 60
End: 2025-04-26
Payer: COMMERCIAL

## 2025-04-26 DIAGNOSIS — E78.5 DYSLIPIDEMIA: ICD-10-CM

## 2025-04-26 DIAGNOSIS — I10 PRIMARY HYPERTENSION: ICD-10-CM

## 2025-04-26 DIAGNOSIS — R73.03 PREDIABETES: ICD-10-CM

## 2025-04-26 LAB
ALBUMIN SERPL BCG-MCNC: 4.4 G/DL (ref 3.5–5)
ALP SERPL-CCNC: 53 U/L (ref 34–104)
ALT SERPL W P-5'-P-CCNC: 25 U/L (ref 7–52)
ANION GAP SERPL CALCULATED.3IONS-SCNC: 5 MMOL/L (ref 4–13)
AST SERPL W P-5'-P-CCNC: 18 U/L (ref 13–39)
BILIRUB SERPL-MCNC: 0.97 MG/DL (ref 0.2–1)
BUN SERPL-MCNC: 15 MG/DL (ref 5–25)
CALCIUM SERPL-MCNC: 9.5 MG/DL (ref 8.4–10.2)
CHLORIDE SERPL-SCNC: 104 MMOL/L (ref 96–108)
CHOLEST SERPL-MCNC: 150 MG/DL (ref ?–200)
CO2 SERPL-SCNC: 29 MMOL/L (ref 21–32)
CREAT SERPL-MCNC: 0.84 MG/DL (ref 0.6–1.3)
EST. AVERAGE GLUCOSE BLD GHB EST-MCNC: 137 MG/DL
GFR SERPL CREATININE-BSD FRML MDRD: 96 ML/MIN/1.73SQ M
GLUCOSE P FAST SERPL-MCNC: 123 MG/DL (ref 65–99)
HBA1C MFR BLD: 6.4 %
HDLC SERPL-MCNC: 59 MG/DL
LDLC SERPL CALC-MCNC: 76 MG/DL (ref 0–100)
POTASSIUM SERPL-SCNC: 4.7 MMOL/L (ref 3.5–5.3)
PROT SERPL-MCNC: 6.9 G/DL (ref 6.4–8.4)
SODIUM SERPL-SCNC: 138 MMOL/L (ref 135–147)
TRIGL SERPL-MCNC: 77 MG/DL (ref ?–150)

## 2025-04-26 PROCEDURE — 83036 HEMOGLOBIN GLYCOSYLATED A1C: CPT

## 2025-04-26 PROCEDURE — 80053 COMPREHEN METABOLIC PANEL: CPT

## 2025-04-26 PROCEDURE — 36415 COLL VENOUS BLD VENIPUNCTURE: CPT

## 2025-04-26 PROCEDURE — 80061 LIPID PANEL: CPT

## 2025-05-14 ENCOUNTER — OFFICE VISIT (OUTPATIENT)
Dept: INTERNAL MEDICINE CLINIC | Facility: OTHER | Age: 60
End: 2025-05-14
Payer: COMMERCIAL

## 2025-05-14 VITALS
DIASTOLIC BLOOD PRESSURE: 74 MMHG | TEMPERATURE: 99.3 F | WEIGHT: 256 LBS | SYSTOLIC BLOOD PRESSURE: 118 MMHG | HEIGHT: 71 IN | HEART RATE: 99 BPM | BODY MASS INDEX: 35.84 KG/M2 | OXYGEN SATURATION: 97 %

## 2025-05-14 DIAGNOSIS — G47.33 OSA (OBSTRUCTIVE SLEEP APNEA): ICD-10-CM

## 2025-05-14 DIAGNOSIS — R39.11 URINARY HESITANCY: ICD-10-CM

## 2025-05-14 DIAGNOSIS — E78.5 DYSLIPIDEMIA: ICD-10-CM

## 2025-05-14 DIAGNOSIS — R73.03 PREDIABETES: ICD-10-CM

## 2025-05-14 DIAGNOSIS — F33.0 MILD EPISODE OF RECURRENT MAJOR DEPRESSIVE DISORDER (HCC): ICD-10-CM

## 2025-05-14 DIAGNOSIS — Z12.5 SCREENING FOR PROSTATE CANCER: ICD-10-CM

## 2025-05-14 DIAGNOSIS — I10 PRIMARY HYPERTENSION: Primary | ICD-10-CM

## 2025-05-14 DIAGNOSIS — Z13.0 SCREENING FOR DEFICIENCY ANEMIA: ICD-10-CM

## 2025-05-14 PROCEDURE — 99214 OFFICE O/P EST MOD 30 MIN: CPT | Performed by: NURSE PRACTITIONER

## 2025-05-14 NOTE — PROGRESS NOTES
Name: Prasad Langley      : 1965      MRN: [id number]  Encounter Provider: BRYCE Waldron  Encounter Date: 2025   Encounter department: [facility]  :  Assessment & Plan  Primary hypertension  Controlled on lisinopril 10mg daily  Continue current regimen, follow up in 6 months        Prediabetes  Hba1c 6.4  Discussed diet modifications and importance of a routine exercise regimen  Discussed option for metformin, pt deferred and would like to work on lifestyle changes first  Repeat labs in 6 months   Orders:    Hemoglobin A1C; Future    Comprehensive metabolic panel; Future    Dyslipidemia  Lipids at goal, LDL 76  Continue atorvastatin 20mg daily and fish oil 2000mg bid     Orders:    Lipid Panel with Direct LDL reflex; Future    Screening for prostate cancer    Orders:    PSA, Total Screen; Future    Screening for deficiency anemia    Orders:    CBC and differential; Future    ELMER (obstructive sleep apnea)  Compliant with CPAP       Mild episode of recurrent major depressive disorder (HCC)  Mood stable on current regimen Wellbutrin XL 150mg daily and Effexor XR 150mg daily  Practicing meditation  Was scheduled with psychiatry but states they cancelled several times, he plans to look into counseling          Urinary hesitancy  Symptoms well controlled on flomax               History of Present Illness   HPI    Patient presents today for 6 month follow up  Labs completed     Prediabetes - Hba1c 6.4, fasting glucose 123.     HLD - total cholesterol 150, triglycerides 77, HDL 59, LDL 76. Currently on fish oil and atorvastatin 20mg daily     Anxiety/depression - he is on Wellbutrin XL 150mg daily and Effexor XR 150mg daily. He has been doing meditation which has been helping a lot. No SI or HI    Review of Systems   Constitutional:  Negative for activity change, appetite change and unexpected weight change.   Respiratory:  Negative for cough, chest  "tightness, shortness of breath and wheezing.    Cardiovascular:  Negative for chest pain.   Neurological:  Negative for headaches.   Psychiatric/Behavioral:  Positive for dysphoric mood. Negative for self-injury and suicidal ideas. The patient is nervous/anxious.        Objective   /74 (BP Location: Left arm, Patient Position: Sitting, Cuff Size: Adult)   Pulse 99   Temp 99.3 °F (37.4 °C) (Temporal)   Ht 5' 11\" (1.803 m)   Wt 116 kg (256 lb)   SpO2 97%   BMI 35.70 kg/m²      Physical Exam  Vitals reviewed.   Constitutional:       General: He is not in acute distress.     Appearance: Normal appearance. He is obese. He is not diaphoretic.   HENT:      Head: Normocephalic and atraumatic.     Eyes:      Extraocular Movements: Extraocular movements intact.      Conjunctiva/sclera: Conjunctivae normal.      Pupils: Pupils are equal, round, and reactive to light.       Cardiovascular:      Rate and Rhythm: Normal rate and regular rhythm.      Heart sounds: Normal heart sounds. No murmur heard.  Pulmonary:      Effort: Pulmonary effort is normal. No respiratory distress.      Breath sounds: Normal breath sounds. No wheezing, rhonchi or rales.     Neurological:      Mental Status: He is alert and oriented to person, place, and time. Mental status is at baseline.     Psychiatric:         Mood and Affect: Mood normal.         Behavior: Behavior normal.         "

## 2025-05-14 NOTE — ASSESSMENT & PLAN NOTE
Hba1c 6.4  Discussed diet modifications and importance of a routine exercise regimen  Discussed option for metformin, pt deferred and would like to work on lifestyle changes first  Repeat labs in 6 months   Orders:    Hemoglobin A1C; Future    Comprehensive metabolic panel; Future

## 2025-05-14 NOTE — ASSESSMENT & PLAN NOTE
Mood stable on current regimen Wellbutrin XL 150mg daily and Effexor XR 150mg daily  Practicing meditation  Was scheduled with psychiatry but states they cancelled several times, he plans to look into counseling

## 2025-05-14 NOTE — ASSESSMENT & PLAN NOTE
Lipids at goal, LDL 76  Continue atorvastatin 20mg daily and fish oil 2000mg bid     Orders:    Lipid Panel with Direct LDL reflex; Future

## 2025-06-11 DIAGNOSIS — F32.A DEPRESSION, UNSPECIFIED DEPRESSION TYPE: ICD-10-CM

## 2025-06-12 RX ORDER — BUPROPION HYDROCHLORIDE 150 MG/1
150 TABLET ORAL DAILY
Qty: 90 TABLET | Refills: 0 | Status: SHIPPED | OUTPATIENT
Start: 2025-06-12

## 2025-07-08 ENCOUNTER — OFFICE VISIT (OUTPATIENT)
Dept: SLEEP CENTER | Facility: CLINIC | Age: 60
End: 2025-07-08
Payer: COMMERCIAL

## 2025-07-08 VITALS
DIASTOLIC BLOOD PRESSURE: 80 MMHG | HEART RATE: 81 BPM | WEIGHT: 256 LBS | HEIGHT: 71 IN | SYSTOLIC BLOOD PRESSURE: 130 MMHG | BODY MASS INDEX: 35.84 KG/M2 | OXYGEN SATURATION: 94 %

## 2025-07-08 DIAGNOSIS — F33.0 MILD EPISODE OF RECURRENT MAJOR DEPRESSIVE DISORDER (HCC): ICD-10-CM

## 2025-07-08 DIAGNOSIS — G47.33 OSA (OBSTRUCTIVE SLEEP APNEA): Primary | ICD-10-CM

## 2025-07-08 DIAGNOSIS — I10 PRIMARY HYPERTENSION: ICD-10-CM

## 2025-07-08 PROCEDURE — 99214 OFFICE O/P EST MOD 30 MIN: CPT | Performed by: STUDENT IN AN ORGANIZED HEALTH CARE EDUCATION/TRAINING PROGRAM

## 2025-07-08 NOTE — PROGRESS NOTES
Name: Prasad Langley      : 1965      MRN: [id number]  Encounter Provider: Servando Metz MD  Encounter Date: 2025   Encounter department: [facility]    :  Assessment & Plan  ELMER (obstructive sleep apnea)  Moderate Obstructive Sleep Apnea - Discussed pathophysiology of ELMER, consequences of untreated ELMER and treatment options including PAP therapy,. - Discussed risks of sleepy driving and mitigation strategies (napping). Patient agrees to not drive if tired or sleepy. - Advised avoidance of alcohol and centrally acting medications as these can worsen ELMER.  - Prasad presents today for follow-up of moderate obstructive sleep apnea on auto CPAP therapy.  He has excellent compliance.  He is wearing CPAP every night.  He has a well-controlled residual AHI.  CPAP remains medically necessary to treat his sleep disordered breathing.  - I have encouraged the patient to continue to use CPAP with all periods of sleep including naps.  I have asked the patient to return to the office within 1 year or sooner for close clinical follow-up.  Patient verbalized understanding and said that he would do so.  - CPAP resupply rest in place during today's office visit.  - Pressure changes been submitted during today's office visit to limit his CPAP pressure to minimize central apnea events.    Orders:    PAP DME Pressure Change    PAP DME Resupply/Reorder    Mild episode of recurrent major depressive disorder (HCC)  We reviewed the association between sleep, mood, and coexisting psychiatric disorders. We discussed the importance of obtaining adequate sleep of at least 7 hours nightly. Patient was encouraged to continue current prescribed medications and to continue follow up with their PCP/psychiatrist for further management.     Orders:    PAP DME Resupply/Reorder    Primary hypertension  Hypertension - We reviewed the association between untreated obstructive sleep apnea and the increased risk  "for hypertension. Patient to continue on prescribed anti-hypertensive therapy and follow up with PCP for continuity of care.     Orders:    PAP DME Resupply/Reorder      History of Present Illness       Sitting and reading: (Patient-Rptd) (P) Slight chance of dozing  Watching TV: (Patient-Rptd) (P) Slight chance of dozing  Sitting, inactive in a public place (e.g. a theatre or a meeting): (Patient-Rptd) (P) Slight chance of dozing  As a passenger in a car for an hour without a break: (Patient-Rptd) (P) Slight chance of dozing  Lying down to rest in the afternoon when circumstances permit: (Patient-Rptd) (P) Moderate chance of dozing  Sitting and talking to someone: (Patient-Rptd) (P) Slight chance of dozing  Sitting quietly after a lunch without alcohol: (Patient-Rptd) (P) Slight chance of dozing  In a car, while stopped for a few minutes in traffic: (Patient-Rptd) (P) Slight chance of dozing  Total score: (Patient-Rptd) (P) 9       Review of Systems  Pertinent positives/negatives included in HPI and also as noted:         Objective   /80 (BP Location: Left arm, Patient Position: Sitting, Cuff Size: Adult)   Pulse 81   Ht 5' 11\" (1.803 m)   Wt 116 kg (256 lb)   SpO2 94%   BMI 35.70 kg/m²        [facility]  Follow-up Visit    Patient Name: Prasad Langley  Date of Service: 07/14/25  Date of Last Visit: Visit date not found      PATIENT PROFILE  Mr. Prasad Langley is a 60 y.o. male with past medical history significant for:  Moderate obstructive sleep apnea, obesity, hypertension, hyperlipidemia, depression.    I have reviewed the 5/14/25 internal medicine office note with BRYCE Waldron.     I have reviewed the 7/2/24 sleep medicine office note with Ottoniel Butts DO.     Interval History:  Prasad presents today for follow-up of moderate obstructive sleep apnea on auto CPAP therapy.    He has excellent compliance.  He is using CPAP every night.  He has a " well-controlled residual AHI.  CPAP remains medically necessary to treat his sleep disordered breathing.  I have encouraged him to continue to use CPAP with all periods of sleep including naps.    I have asked the patient to return to the office within 1 year or sooner for close clinical follow-up.  Patient verbalized understanding and stated that he would do so.    ESS today is 9/24 (normal is < 10).       CPAP Compliance  DME: [employer]  Machine Type and Settings: AutoCPAP 4-20 cmH2O   Mask Type: Nasal  Compliance Reviewed (6/2/25-7/1/25)  100% Usage (30 out of 30 days).   Used > 4 hours a Night For 100% of nights.  AutoCPAP Median Pressure -  7.7 cmH2O, 95% Pressure - 11.2 cmH20   Median Leak - 8.4 L/min, 95% Leak - 48.8 L/min  Residual AHI 2.4 events/hour    He reports the following problems with the mask/CPAP:   Mask leaks   Skin irritation or breakdown     Frequent mask adjustment   Eye irritation     Difficulty tolerating air pressure   Nose bleeds     Generalized discomfort   Noisy     Difficulty applying or adjusting   Condensation in mask/tubing     Causes frequent awakenings   Complaints from bed partner     Pressure on nasal bridge or nostrils   Dry mouth     Wrong size   Dry nose     Nasal congestion   Using humidifier: Y or N     Other:   Other:      He reports the following improvements in his sleep-related symptoms on PAP therapy:   X Decreased/abolished snoring   Improving - More refreshed in AM     Decreased gasping/choking   Decreased/abolished AM headaches     Decreased nocturia   Decreased daytime sleepiness    X Improved sleep quality   Decreased napping/dozing    X Decreased awakenings  X Increased energy    X Decreased restlessness  X Improved alertness     Dry mouth improved  X Improved mood     Improved nasal congestion/allergies   Improved memory/concentration     Improved GERD   Other:     Improved blood pressure   Other:      Patient's, past medical, surgical, social and family histories  "were reviewed and updated as appropriate.    Allergies[1]    Current Outpatient Medications on File Prior to Visit   Medication Sig    aspirin 81 MG tablet Take 1 tablet by mouth in the morning.    atorvastatin (LIPITOR) 20 mg tablet TAKE 1 TABLET BY MOUTH DAILY    co-enzyme Q-10 30 MG capsule Take 30 mg by mouth in the morning.    Green Tea 150 MG CAPS Take by mouth in the morning.    Multiple Vitamins-Minerals (MULTIVITAMIN ADULT) TABS Take 1 tablet by mouth in the morning.    naproxen (Naprosyn) 500 mg tablet Take 1 tablet (500 mg total) by mouth 2 (two) times a day with meals    Omega-3 Fatty Acids ([employer] FISH OIL) 1000 MG CAPS Take 2 capsules by mouth in the morning and 2 capsules in the evening.    Turmeric Curcumin 500 MG CAPS Take 1 capsule by mouth in the morning.    venlafaxine (EFFEXOR-XR) 150 mg 24 hr capsule TAKE 1 CAPSULE BY MOUTH DAILY     No current facility-administered medications on file prior to visit.       Physical Examination:  Gen: No acute distress, not visibly anxious, speaking comfortably  H&N: MM III; no retro/micrognathia; no macroglossia; no visible thyromegaly  Neuro: Alert and oriented x3, interactive  Psych: Pleasant, normal affect  Skin: No visible rashes  Respiratory: No accessory muscle use, breathing comfortably  Cardiac: No visible edema of extremities  Abdomen: Soft, nontender, nondistended  Musculoskeletal: Normal ROM of extremities       Lab Results   Component Value Date    SODIUM 138 04/26/2025    K 4.7 04/26/2025     04/26/2025    CO2 29 04/26/2025    BUN 15 04/26/2025    CREATININE 0.84 04/26/2025    GLUC 116 (H) 04/25/2024    CALCIUM 9.5 04/26/2025       Lab Results   Component Value Date    WBC 8.21 10/25/2024    HGB 14.3 10/25/2024    HCT 44.6 10/25/2024    MCV 95 10/25/2024     10/25/2024       Lab Results   Component Value Date    [id number] 2.101 10/25/2024       No results found for: \"FERRITIN\"      Recent Weights: Body mass index is 35.7 " "kg/m².    Results/Data:  I have reviewed the results and report from the 3/16/2024 chest x-ray.    Independent Findings Reviewed: No acute cardiopulmonary disease.    I have reviewed the results and report from the 9/20/2020 chest x-ray.    Independent Findings Reviewed: No acute cardiopulmonary disease    Follow-up will be in 12 months, or sooner as needed. I encouraged him to contact me with any questions, problems or concerns in the interim.  We will continue with longitudinal follow-up for management of sleep disordered breathing.    Servando Metz MD  Sleep Medicine and Internal Medicine  [facility]  07/14/25      Portions of the record may have been created with voice recognition software.  Occasional wrong word or \"sound a like\" substitutions may have occurred due to the inherent limitations of voice recognition software.  Read the chart carefully and recognize, using context, where substitutions have occurred.            [1] No Known Allergies    "

## 2025-07-08 NOTE — ASSESSMENT & PLAN NOTE
We reviewed the association between sleep, mood, and coexisting psychiatric disorders. We discussed the importance of obtaining adequate sleep of at least 7 hours nightly. Patient was encouraged to continue current prescribed medications and to continue follow up with their PCP/psychiatrist for further management.     Orders:    PAP DME Resupply/Reorder

## 2025-07-08 NOTE — PATIENT INSTRUCTIONS
"Patient Education     Sleep apnea in adults   The Basics   Written by the doctors and editors at [facility]   What is sleep apnea? -- Sleep apnea is a condition that makes you stop breathing for short periods while you are asleep. There are 2 types of sleep apnea. One is called \"obstructive sleep apnea.\" The other is called \"central sleep apnea.\"  In obstructive sleep apnea, you stop breathing because your throat narrows or closes (figure 1). In central sleep apnea, you stop breathing because your brain does not send the right signals to your muscles to make you breathe. When people talk about sleep apnea, they are usually referring to obstructive sleep apnea, which is what this article is about.  People with sleep apnea do not know that they stop breathing when they are asleep. But they do sometimes wake up startled or gasping for breath. They also often hear from loved ones that they snore.  What are the symptoms of sleep apnea? -- The main symptoms of sleep apnea are loud snoring, tiredness, and daytime sleepiness. Other symptoms can include:   Restless sleep   Waking up choking or gasping   Morning headaches, dry mouth, or sore throat   Waking up often to urinate   Waking up feeling unrested or groggy   Trouble thinking clearly or remembering things  Some people with sleep apnea don't have symptoms, or don't realize that they have them.  Should I see a doctor or nurse? -- Yes. If you think that you might have sleep apnea, see your doctor.  Is there a test for sleep apnea? -- Yes. First, your doctor or nurse will ask about your symptoms. If you have a bed partner, they might also ask that person if you snore or gasp in your sleep. If the doctor or nurse suspects that you have sleep apnea, they might send you for a \"sleep study.\"  Sleep studies can sometimes be done at home, but they are usually done in a sleep lab. For the study, you spend the night in the lab, and you are hooked up to different machines that " "monitor your heart rate, breathing, and other body functions. The results of the test tell your doctor or nurse if you have the disorder.  Is there anything I can do on my own to help my sleep apnea? -- Yes. Some things that might help:   Try to avoid sleeping on your back, if possible. This might help some people.   Lose weight, if you have excess body weight.   Get regular physical activity. This might help you lose weight. But even if it doesn't, being active is good for your health.   Avoid alcohol, especially in the evening. Alcohol can make sleep apnea worse.  How is sleep apnea treated? -- Treatment can include:   Weight loss - As mentioned above, weight loss can help if you have excess weight or obesity. But losing weight can be challenging, and it takes time to lose enough weight to help with your sleep apnea. Most people need other treatment while they work on losing weight.   CPAP - The most effective treatment for sleep apnea is a device that keeps your airway open while you sleep. Treatment with this device is called \"continuous positive airway pressure\" (\"CPAP\"). People getting CPAP wear a face mask at night that keeps them breathing (figure 2).  If your doctor or nurse recommends a CPAP machine, be patient about using it. The mask might seem uncomfortable to wear at first, and the machine might seem noisy, but using the machine can really help you. People with sleep apnea who use a CPAP machine feel more rested and generally feel better.  If CPAP does not work, your doctor might suggest other treatment. Options might include:   An oral device - This is a device that you wear in your mouth. It is called an \"oral appliance\" or \"mandibular advancement device.\" It helps keep your airway open while you sleep.   Hypoglossal nerve stimulation - This involves a procedure to implant a small device into your chest. The device has a wire that connects to the nerve under your tongue. While you are sleeping, it " sends an electrical signal that causes the tongue to push forward. This helps open up your airway.   Surgery to widen your airway - This might involve removing your tonsils or other tissue that blocks the airway.  Is sleep apnea dangerous? -- It can be. Risks include:   Accidents - People with sleep apnea do not get good-quality sleep, so they are often tired and not alert. This puts them at risk for car accidents and other types of accidents.   Other health problems - Studies show that people with sleep apnea are more likely than others to have high blood pressure, heart attacks, and other serious heart problems. Some people also have mood changes or depression.  In people with severe sleep apnea, getting treated (for example, with CPAP) can help lower these risks.  All topics are updated as new evidence becomes available and our peer review process is complete.  This topic retrieved from [employer] on: Feb 28, 2024.  Topic 96891 Version 18.0  Release: 32.2.4 - C32.58  © 2024 [employer] and/or its affiliates. All rights reserved.  figure 1: Airway in a person with sleep apnea     Normally, when a person sleeps, the airway remains open, and air can pass from the nose and mouth to the lungs. In a person with sleep apnea, parts of the throat and mouth drop into the airway and block off the flow of air. This can cause loud snoring and interrupt breathing for short periods.  Graphic 92217 Version 6.0  figure 2: Continuous positive airway pressure (CPAP) for sleep apnea     The CPAP mask gently blows air into your nose while you sleep. It puts just enough pressure on your airway to keep it from closing. The mask in this picture fits over just the nose. Other CPAP devices have masks that fit over the nose and mouth.  Graphic 10402 Version 5.0  Consumer Information Use and Disclaimer   Disclaimer: This generalized information is a limited summary of diagnosis, treatment, and/or medication information. It is not meant to  be comprehensive and should be used as a tool to help the user understand and/or assess potential diagnostic and treatment options. It does NOT include all information about conditions, treatments, medications, side effects, or risks that may apply to a specific patient. It is not intended to be medical advice or a substitute for the medical advice, diagnosis, or treatment of a health care provider based on the health care provider's examination and assessment of a patient's specific and unique circumstances. Patients must speak with a health care provider for complete information about their health, medical questions, and treatment options, including any risks or benefits regarding use of medications. This information does not endorse any treatments or medications as safe, effective, or approved for treating a specific patient. [employer] and its affiliates disclaim any warranty or liability relating to this information or the use thereof.The use of this information is governed by the Terms of Use, available at https://www.woltersAkanoouwer.com/en/know/clinical-effectiveness-terms. 2024© [employer]Date, Inc. and its affiliates and/or licensors. All rights reserved.  Copyright   © 2024 [employer] and/or its affiliates. All rights reserved.

## 2025-07-08 NOTE — ASSESSMENT & PLAN NOTE
Moderate Obstructive Sleep Apnea - Discussed pathophysiology of ELMER, consequences of untreated ELMER and treatment options including PAP therapy,. - Discussed risks of sleepy driving and mitigation strategies (napping). Patient agrees to not drive if tired or sleepy. - Advised avoidance of alcohol and centrally acting medications as these can worsen ELMER.  - Prasad presents today for follow-up of moderate obstructive sleep apnea on auto CPAP therapy.  He has excellent compliance.  He is wearing CPAP every night.  He has a well-controlled residual AHI.  CPAP remains medically necessary to treat his sleep disordered breathing.  - I have encouraged the patient to continue to use CPAP with all periods of sleep including naps.  I have asked the patient to return to the office within 1 year or sooner for close clinical follow-up.  Patient verbalized understanding and said that he would do so.  - CPAP resupply rest in place during today's office visit.  - Pressure changes been submitted during today's office visit to limit his CPAP pressure to minimize central apnea events.    Orders:    PAP DME Pressure Change    PAP DME Resupply/Reorder

## 2025-07-09 ENCOUNTER — TELEPHONE (OUTPATIENT)
Dept: SLEEP CENTER | Facility: CLINIC | Age: 60
End: 2025-07-09

## 2025-07-10 DIAGNOSIS — I10 PRIMARY HYPERTENSION: ICD-10-CM

## 2025-07-10 DIAGNOSIS — F32.A DEPRESSION, UNSPECIFIED DEPRESSION TYPE: ICD-10-CM

## 2025-07-10 DIAGNOSIS — R39.11 URINARY HESITANCY: ICD-10-CM

## 2025-07-10 LAB

## 2025-07-11 RX ORDER — TAMSULOSIN HYDROCHLORIDE 0.4 MG/1
0.4 CAPSULE ORAL
Qty: 90 CAPSULE | Refills: 0 | Status: SHIPPED | OUTPATIENT
Start: 2025-07-11

## 2025-07-11 RX ORDER — LISINOPRIL 10 MG/1
10 TABLET ORAL DAILY
Qty: 90 TABLET | Refills: 0 | Status: SHIPPED | OUTPATIENT
Start: 2025-07-11

## 2025-07-11 RX ORDER — BUPROPION HYDROCHLORIDE 150 MG/1
150 TABLET ORAL DAILY
Qty: 90 TABLET | Refills: 0 | Status: SHIPPED | OUTPATIENT
Start: 2025-07-11

## (undated) DEVICE — CURITY STRETCH BANDAGE: Brand: CURITY

## (undated) DEVICE — PREMIUM DRY TRAY LF: Brand: MEDLINE INDUSTRIES, INC.

## (undated) DEVICE — GLOVE SRG BIOGEL 7

## (undated) DEVICE — GAUZE SPONGES,16 PLY: Brand: CURITY

## (undated) DEVICE — CHLORAPREP HI-LITE 26ML ORANGE

## (undated) DEVICE — CURITY NON-ADHERENT STRIPS: Brand: CURITY

## (undated) DEVICE — PADDING CAST 4 IN  COTTON STRL

## (undated) DEVICE — DRAPE C-ARM X-RAY

## (undated) DEVICE — ZIMMER® STERILE DISPOSABLE TOURNIQUET CUFF, DUAL PORT, SINGLE BLADDER, 18 IN. (46 CM)

## (undated) DEVICE — ACE WRAP 4 IN UNSTERILE

## (undated) DEVICE — GLOVE SRG LF STRL BGL SKNSNS 7 PF

## (undated) DEVICE — CANNULATED TAP JFX: Brand: ASNIS

## (undated) DEVICE — INTENDED FOR TISSUE SEPARATION, AND OTHER PROCEDURES THAT REQUIRE A SHARP SURGICAL BLADE TO PUNCTURE OR CUT.: Brand: BARD-PARKER SAFETY BLADES SIZE 15, STERILE

## (undated) DEVICE — CANNULATED COUNTERSINK JFX: Brand: ASNIS

## (undated) DEVICE — BETHLEHEM UNIVERSAL  MIONR EXT: Brand: CARDINAL HEALTH

## (undated) DEVICE — ACE WRAP 6 IN STERILE

## (undated) DEVICE — GLOVE INDICATOR PI UNDERGLOVE SZ 7 BLUE

## (undated) DEVICE — PADDING CAST 6IN COTTON STRL

## (undated) DEVICE — CANNULATED DRILL BIT JFX: Brand: ASNIS

## (undated) DEVICE — STRETCH BANDAGE: Brand: CURITY

## (undated) DEVICE — ACE WRAP 6 IN UNSTERILE

## (undated) DEVICE — SPLINT COMFORT 4 X 30

## (undated) DEVICE — CYSTO TUBING SINGLE IRRIGATION

## (undated) DEVICE — UNTHREADED GUIDE WIRE JFX: Brand: ASNIS

## (undated) DEVICE — 10FR FRAZIER SUCTION HANDLE: Brand: CARDINAL HEALTH

## (undated) DEVICE — NEEDLE 25G X 1 1/2

## (undated) DEVICE — PLUMEPEN PRO 10FT

## (undated) DEVICE — ACE WRAP 4 IN STERILE

## (undated) DEVICE — POV-IOD SOLUTION 4OZ BT

## (undated) DEVICE — SINGLE PORT MANIFOLD: Brand: NEPTUNE 2